# Patient Record
Sex: FEMALE | Race: BLACK OR AFRICAN AMERICAN | NOT HISPANIC OR LATINO | URBAN - METROPOLITAN AREA
[De-identification: names, ages, dates, MRNs, and addresses within clinical notes are randomized per-mention and may not be internally consistent; named-entity substitution may affect disease eponyms.]

---

## 2020-01-07 ENCOUNTER — OUTPATIENT (OUTPATIENT)
Dept: OUTPATIENT SERVICES | Facility: HOSPITAL | Age: 80
LOS: 1 days | End: 2020-01-07
Payer: MEDICARE

## 2020-01-07 VITALS
DIASTOLIC BLOOD PRESSURE: 64 MMHG | OXYGEN SATURATION: 95 % | SYSTOLIC BLOOD PRESSURE: 157 MMHG | HEART RATE: 74 BPM | WEIGHT: 270.95 LBS | RESPIRATION RATE: 16 BRPM | HEIGHT: 66 IN | TEMPERATURE: 98 F

## 2020-01-07 DIAGNOSIS — Z90.49 ACQUIRED ABSENCE OF OTHER SPECIFIED PARTS OF DIGESTIVE TRACT: Chronic | ICD-10-CM

## 2020-01-07 DIAGNOSIS — H26.9 UNSPECIFIED CATARACT: Chronic | ICD-10-CM

## 2020-01-07 DIAGNOSIS — Z98.890 OTHER SPECIFIED POSTPROCEDURAL STATES: Chronic | ICD-10-CM

## 2020-01-07 DIAGNOSIS — R06.9 UNSPECIFIED ABNORMALITIES OF BREATHING: ICD-10-CM

## 2020-01-07 DIAGNOSIS — H40.9 UNSPECIFIED GLAUCOMA: Chronic | ICD-10-CM

## 2020-01-07 LAB
ALBUMIN SERPL ELPH-MCNC: 3.9 G/DL — SIGNIFICANT CHANGE UP (ref 3.3–5)
ALP SERPL-CCNC: 90 U/L — SIGNIFICANT CHANGE UP (ref 40–120)
ALT FLD-CCNC: 6 U/L — LOW (ref 10–45)
ANION GAP SERPL CALC-SCNC: 9 MMOL/L — SIGNIFICANT CHANGE UP (ref 5–17)
AST SERPL-CCNC: 17 U/L — SIGNIFICANT CHANGE UP (ref 10–40)
BILIRUB SERPL-MCNC: 0.5 MG/DL — SIGNIFICANT CHANGE UP (ref 0.2–1.2)
BUN SERPL-MCNC: 15 MG/DL — SIGNIFICANT CHANGE UP (ref 7–23)
CALCIUM SERPL-MCNC: 9.6 MG/DL — SIGNIFICANT CHANGE UP (ref 8.4–10.5)
CHLORIDE SERPL-SCNC: 99 MMOL/L — SIGNIFICANT CHANGE UP (ref 96–108)
CO2 SERPL-SCNC: 29 MMOL/L — SIGNIFICANT CHANGE UP (ref 22–31)
CREAT SERPL-MCNC: 1.25 MG/DL — SIGNIFICANT CHANGE UP (ref 0.5–1.3)
GLUCOSE BLDC GLUCOMTR-MCNC: 213 MG/DL — HIGH (ref 70–99)
GLUCOSE SERPL-MCNC: 229 MG/DL — HIGH (ref 70–99)
HCT VFR BLD CALC: 30.5 % — LOW (ref 34.5–45)
HGB BLD-MCNC: 10.4 G/DL — LOW (ref 11.5–15.5)
MAGNESIUM SERPL-MCNC: 1.9 MG/DL — SIGNIFICANT CHANGE UP (ref 1.6–2.6)
MCHC RBC-ENTMCNC: 27.3 PG — SIGNIFICANT CHANGE UP (ref 27–34)
MCHC RBC-ENTMCNC: 34.1 GM/DL — SIGNIFICANT CHANGE UP (ref 32–36)
MCV RBC AUTO: 80.1 FL — SIGNIFICANT CHANGE UP (ref 80–100)
NRBC # BLD: 0 /100 WBCS — SIGNIFICANT CHANGE UP (ref 0–0)
PLATELET # BLD AUTO: 240 K/UL — SIGNIFICANT CHANGE UP (ref 150–400)
POTASSIUM SERPL-MCNC: 4.3 MMOL/L — SIGNIFICANT CHANGE UP (ref 3.5–5.3)
POTASSIUM SERPL-SCNC: 4.3 MMOL/L — SIGNIFICANT CHANGE UP (ref 3.5–5.3)
PROT SERPL-MCNC: 8.3 G/DL — SIGNIFICANT CHANGE UP (ref 6–8.3)
RBC # BLD: 3.81 M/UL — SIGNIFICANT CHANGE UP (ref 3.8–5.2)
RBC # FLD: 14.6 % — HIGH (ref 10.3–14.5)
SODIUM SERPL-SCNC: 137 MMOL/L — SIGNIFICANT CHANGE UP (ref 135–145)
WBC # BLD: 6.96 K/UL — SIGNIFICANT CHANGE UP (ref 3.8–10.5)
WBC # FLD AUTO: 6.96 K/UL — SIGNIFICANT CHANGE UP (ref 3.8–10.5)

## 2020-01-07 PROCEDURE — C1887: CPT

## 2020-01-07 PROCEDURE — 99152 MOD SED SAME PHYS/QHP 5/>YRS: CPT

## 2020-01-07 PROCEDURE — 85027 COMPLETE CBC AUTOMATED: CPT

## 2020-01-07 PROCEDURE — C1760: CPT

## 2020-01-07 PROCEDURE — 93454 CORONARY ARTERY ANGIO S&I: CPT | Mod: 26

## 2020-01-07 PROCEDURE — C1894: CPT

## 2020-01-07 PROCEDURE — 93010 ELECTROCARDIOGRAM REPORT: CPT

## 2020-01-07 PROCEDURE — C1769: CPT

## 2020-01-07 PROCEDURE — 80053 COMPREHEN METABOLIC PANEL: CPT

## 2020-01-07 PROCEDURE — 93454 CORONARY ARTERY ANGIO S&I: CPT

## 2020-01-07 PROCEDURE — 93005 ELECTROCARDIOGRAM TRACING: CPT

## 2020-01-07 PROCEDURE — 83735 ASSAY OF MAGNESIUM: CPT

## 2020-01-07 PROCEDURE — 82962 GLUCOSE BLOOD TEST: CPT

## 2020-01-07 RX ORDER — ACETAMINOPHEN 500 MG
1000 TABLET ORAL ONCE
Refills: 0 | Status: DISCONTINUED | OUTPATIENT
Start: 2020-01-07 | End: 2020-02-02

## 2020-01-07 NOTE — H&P CARDIOLOGY - PMH
2nd degree AV block    Glaucoma of left eye    HLD (hyperlipidemia)    HTN (hypertension)    Morbid obesity    Type 2 diabetes mellitus

## 2020-01-07 NOTE — H&P CARDIOLOGY - PSH
Cataract of right eye    Glaucoma  left eye  H/O hernia repair    H/O left knee surgery    History of cholecystectomy

## 2020-01-07 NOTE — H&P CARDIOLOGY - HISTORY OF PRESENT ILLNESS
80 yo AA female with PMHx HTN, HLD, arthritis, left TKR, morbid obesity (BMI 43.7), DMT2 on insulin who endorses worsening dyspnea on exertion and fatigue and undergoing preop for left eye glaucoma surgery was evaluated by her cardiologist Dr DARCY Lora.  TTE on 12/20/19 normal LV function and wall motion, LA mildly dilated, EF 80%, carotid doppler without any significant obstructive disease.  Holter monitoring on 12/23/19 that showed episodes of AV block type 1, HR ranged from 35bpm to 90bpm.  Pt referred for [facility] for ischemic evaluation for which she presents today with her granddaughter.  Reports some dizziness but denies CP, diaphoresis, palpitations, nausea, vomiting, peripheral edema, recent weight gain, or syncope.  No prior cardiac monitoring device implant.     Cardiology Dr DARCY Lora    Symptoms:        Angina (Class):        Ischemic Symptoms: GIRARD    Heart Failure:        Systolic/Diastolic/Combined:        NYHA Class (within 2 weeks):     Assessment of LVEF:       EF: 80%       Assessed by:  TTE       Date: 12/20/19    Prior Cardiac Interventions: no       PCI's: no       CABG: no    Noninvasive Testing:   Stress Test: Date: no       Protocol:        Duration of Exercise:        Symptoms:        EKG Changes:        DTS:        Myocardial Imaging:        Risk Assessment:     Echo: 12/20/19 TTE on 12/20/19 normal LV function and wall motion, LA mildly dilated, EF 80%,    Antianginal Therapies: yes       Beta Blockers:  no       Calcium Channel Blockers: yes       Long Acting Nitrates: yes       Ranexa:     Associated Risk Factors:        Cerebrovascular Disease: N/A       Chronic Lung Disease: N/A       Peripheral Arterial Disease: N/A       Chronic Kidney Disease (if yes, what is GFR): N/A       Uncontrolled Diabetes (if yes, what is HgbA1C or FBS): N/A       Poorly Controlled Hypertension (if yes, what is SBP): N/A       Morbid Obesity (if yes, what is BMI): yes, BMI 43.7       History of Recent Ventricular Arrhythmia: N/A       Inability to Ambulate Safely: N/A       Need for Therapeutic Anticoagulation: N/A       Antiplatelet or Contrast Allergy: N/A

## 2020-01-09 PROBLEM — E78.5 HYPERLIPIDEMIA, UNSPECIFIED: Chronic | Status: ACTIVE | Noted: 2020-01-07

## 2020-01-09 PROBLEM — H40.9 UNSPECIFIED GLAUCOMA: Chronic | Status: ACTIVE | Noted: 2020-01-07

## 2020-01-09 PROBLEM — E11.9 TYPE 2 DIABETES MELLITUS WITHOUT COMPLICATIONS: Chronic | Status: ACTIVE | Noted: 2020-01-07

## 2020-01-09 PROBLEM — I10 ESSENTIAL (PRIMARY) HYPERTENSION: Chronic | Status: ACTIVE | Noted: 2020-01-07

## 2020-01-09 PROBLEM — I44.1 ATRIOVENTRICULAR BLOCK, SECOND DEGREE: Chronic | Status: ACTIVE | Noted: 2020-01-07

## 2020-01-09 PROBLEM — E66.01 MORBID (SEVERE) OBESITY DUE TO EXCESS CALORIES: Chronic | Status: ACTIVE | Noted: 2020-01-07

## 2020-01-13 ENCOUNTER — OUTPATIENT (OUTPATIENT)
Dept: OUTPATIENT SERVICES | Facility: HOSPITAL | Age: 80
LOS: 1 days | End: 2020-01-13
Payer: MEDICARE

## 2020-01-13 VITALS
OXYGEN SATURATION: 96 % | SYSTOLIC BLOOD PRESSURE: 135 MMHG | TEMPERATURE: 98 F | WEIGHT: 270.95 LBS | RESPIRATION RATE: 16 BRPM | HEART RATE: 62 BPM | DIASTOLIC BLOOD PRESSURE: 62 MMHG | HEIGHT: 66 IN

## 2020-01-13 DIAGNOSIS — Z98.890 OTHER SPECIFIED POSTPROCEDURAL STATES: Chronic | ICD-10-CM

## 2020-01-13 DIAGNOSIS — Z01.818 ENCOUNTER FOR OTHER PREPROCEDURAL EXAMINATION: ICD-10-CM

## 2020-01-13 DIAGNOSIS — H40.9 UNSPECIFIED GLAUCOMA: Chronic | ICD-10-CM

## 2020-01-13 DIAGNOSIS — H26.9 UNSPECIFIED CATARACT: Chronic | ICD-10-CM

## 2020-01-13 DIAGNOSIS — Z90.49 ACQUIRED ABSENCE OF OTHER SPECIFIED PARTS OF DIGESTIVE TRACT: Chronic | ICD-10-CM

## 2020-01-13 DIAGNOSIS — I49.9 CARDIAC ARRHYTHMIA, UNSPECIFIED: ICD-10-CM

## 2020-01-13 LAB
ALBUMIN SERPL ELPH-MCNC: 3.7 G/DL — SIGNIFICANT CHANGE UP (ref 3.3–5)
ALP SERPL-CCNC: 79 U/L — SIGNIFICANT CHANGE UP (ref 40–120)
ALT FLD-CCNC: 11 U/L — SIGNIFICANT CHANGE UP (ref 10–45)
ANION GAP SERPL CALC-SCNC: 9 MMOL/L — SIGNIFICANT CHANGE UP (ref 5–17)
APTT BLD: 28.9 SEC — SIGNIFICANT CHANGE UP (ref 27.5–36.3)
AST SERPL-CCNC: 20 U/L — SIGNIFICANT CHANGE UP (ref 10–40)
BILIRUB SERPL-MCNC: 0.7 MG/DL — SIGNIFICANT CHANGE UP (ref 0.2–1.2)
BLD GP AB SCN SERPL QL: NEGATIVE — SIGNIFICANT CHANGE UP
BUN SERPL-MCNC: 19 MG/DL — SIGNIFICANT CHANGE UP (ref 7–23)
CALCIUM SERPL-MCNC: 9.4 MG/DL — SIGNIFICANT CHANGE UP (ref 8.4–10.5)
CHLORIDE SERPL-SCNC: 103 MMOL/L — SIGNIFICANT CHANGE UP (ref 96–108)
CO2 SERPL-SCNC: 28 MMOL/L — SIGNIFICANT CHANGE UP (ref 22–31)
CREAT SERPL-MCNC: 1.3 MG/DL — SIGNIFICANT CHANGE UP (ref 0.5–1.3)
GLUCOSE SERPL-MCNC: 64 MG/DL — LOW (ref 70–99)
HCT VFR BLD CALC: 30.3 % — LOW (ref 34.5–45)
HGB BLD-MCNC: 10.5 G/DL — LOW (ref 11.5–15.5)
INR BLD: 1.06 RATIO — SIGNIFICANT CHANGE UP (ref 0.88–1.16)
MCHC RBC-ENTMCNC: 27.3 PG — SIGNIFICANT CHANGE UP (ref 27–34)
MCHC RBC-ENTMCNC: 34.7 GM/DL — SIGNIFICANT CHANGE UP (ref 32–36)
MCV RBC AUTO: 78.7 FL — LOW (ref 80–100)
NRBC # BLD: 0 /100 WBCS — SIGNIFICANT CHANGE UP (ref 0–0)
NT-PROBNP SERPL-SCNC: 444 PG/ML — HIGH (ref 0–300)
PLATELET # BLD AUTO: 220 K/UL — SIGNIFICANT CHANGE UP (ref 150–400)
POTASSIUM SERPL-MCNC: 4.4 MMOL/L — SIGNIFICANT CHANGE UP (ref 3.5–5.3)
POTASSIUM SERPL-SCNC: 4.4 MMOL/L — SIGNIFICANT CHANGE UP (ref 3.5–5.3)
PROT SERPL-MCNC: 8.3 G/DL — SIGNIFICANT CHANGE UP (ref 6–8.3)
PROTHROM AB SERPL-ACNC: 12.1 SEC — SIGNIFICANT CHANGE UP (ref 10–12.9)
RBC # BLD: 3.85 M/UL — SIGNIFICANT CHANGE UP (ref 3.8–5.2)
RBC # FLD: 14.6 % — HIGH (ref 10.3–14.5)
RH IG SCN BLD-IMP: POSITIVE — SIGNIFICANT CHANGE UP
SODIUM SERPL-SCNC: 140 MMOL/L — SIGNIFICANT CHANGE UP (ref 135–145)
WBC # BLD: 6.94 K/UL — SIGNIFICANT CHANGE UP (ref 3.8–10.5)
WBC # FLD AUTO: 6.94 K/UL — SIGNIFICANT CHANGE UP (ref 3.8–10.5)

## 2020-01-13 PROCEDURE — 86850 RBC ANTIBODY SCREEN: CPT

## 2020-01-13 PROCEDURE — 93010 ELECTROCARDIOGRAM REPORT: CPT

## 2020-01-13 PROCEDURE — 86901 BLOOD TYPING SEROLOGIC RH(D): CPT

## 2020-01-13 PROCEDURE — 71046 X-RAY EXAM CHEST 2 VIEWS: CPT

## 2020-01-13 PROCEDURE — 93005 ELECTROCARDIOGRAM TRACING: CPT

## 2020-01-13 PROCEDURE — 71046 X-RAY EXAM CHEST 2 VIEWS: CPT | Mod: 26

## 2020-01-13 PROCEDURE — G0463: CPT

## 2020-01-13 PROCEDURE — 86900 BLOOD TYPING SEROLOGIC ABO: CPT

## 2020-01-13 RX ORDER — IPRATROPIUM/ALBUTEROL SULFATE 18-103MCG
3 AEROSOL WITH ADAPTER (GRAM) INHALATION ONCE
Refills: 0 | Status: COMPLETED | OUTPATIENT
Start: 2020-01-13 | End: 2020-01-13

## 2020-01-13 RX ORDER — FUROSEMIDE 40 MG
1 TABLET ORAL
Qty: 0 | Refills: 0 | DISCHARGE

## 2020-01-13 RX ADMIN — Medication 3 MILLILITER(S): at 13:45

## 2020-01-13 NOTE — H&P CARDIOLOGY - HISTORY OF PRESENT ILLNESS
78 y/o  female, no implantable cardiac devices, with PMH HTN, HLD, arthritis, left TKR, morbid obesity (BMI 43.7), DMT2 on insulin who endorses worsening dyspnea on exertion and fatigue and undergoing preop for left eye glaucoma surgery was evaluated by her cardiologist Dr DARCY Lora.  TTE on 12/20/19 normal LV function and wall motion, LA mildly dilated, EF 80%, carotid doppler without any significant obstructive disease.  Holter monitoring on 12/23/19 that showed episodes of AV block type 1, HR ranged from 35bpm to 90bpm.  Pt was referred for [facility] for ischemic evaluation on 1/7/2020 which revealed mild CAD.   Cardiology Dr DARCY Lora 80 y/o  female, no implantable cardiac devices, with PMH HTN, HLD, arthritis, left TKR, morbid obesity (BMI 43.7), DMT2 on insulin, who endorses worsening dyspnea on exertion and fatigue and undergoing preop for left eye glaucoma surgery was evaluated by her cardiologist Dr DARCY Lora.  TTE on 12/20/19 normal LV function and wall motion, LA mildly dilated, EF 80%, carotid doppler without any significant obstructive disease.  Holter monitoring on 12/23/19 that showed episodes of AV block, HR ranged from 35bpm to 90bpm.  Pt was referred for [facility] for ischemic evaluation on 1/7/2020 which revealed mild CAD. She now presents for PST today for PPM implant on 1/14/20.   Cardiology Dr DARCY Lora 80 y/o  female, no implantable cardiac devices, with PMH HTN, HLD, arthritis, left TKR, morbid obesity (BMI 43.7), DMT2 on insulin, who endorses worsening dyspnea on exertion and fatigue and undergoing preop for left eye glaucoma surgery was evaluated by her cardiologist Dr DARCY Lora.  TTE on 12/20/19 normal LV function and wall motion, LA mildly dilated, EF 80%, carotid doppler without any significant obstructive disease.  Holter monitoring on 12/23/19 that showed episodes of AV block, HR ranged from 35bpm to 90bpm.  Pt was referred for [facility] for ischemic evaluation on 1/7/2020 which revealed mild CAD. She now presents for PST today for PPM implant on 1/14/20.   On exam, pt with wheezing. She notes productive cough with clear phlegm for 1 month. Cough is worse at night. Denies fevers/chills. Spoke with Dr. Lora. Pt is pending CXR today and will review results with Dr. Lora prior to pt leaving today.   Cardiology Dr DARCY Lora 80 y/o  female, no implantable cardiac devices, with PMH HTN, HLD, arthritis, left TKR, morbid obesity (BMI 43.7), DMT2 on insulin, who endorses worsening dyspnea on exertion and fatigue and undergoing preop for left eye glaucoma surgery was evaluated by her cardiologist Dr DARCY Lora.  TTE on 12/20/19 normal LV function and wall motion, LA mildly dilated, EF 80%, carotid doppler without any significant obstructive disease.  Holter monitoring on 12/23/19 that showed episodes of AV block, HR ranged from 35bpm to 90bpm.  Pt was referred for [facility] for ischemic evaluation on 1/7/2020 which revealed mild CAD. She now presents for PST today for PPM implant on 1/14/20.   On exam, pt with wheezing. She notes productive cough with clear phlegm for 1 month. Cough is worse at night. Denies fevers/chills. Spoke with Dr. Lora. Pt is pending CXR today and will review results with Dr. Lora prior to pt leaving today.     Addendum: CXR shows clear lungs and wheeze improved post neb. Results reviewed with Dr. Lora. Procedure cancelled per MD - pt instructed to f/u with PMD tomorrow and call Dr. Lora's office this week to reschedule. This was reviewed with pt, pt's grandson (at bedside), and pt's daughter via phone.

## 2020-02-07 ENCOUNTER — INPATIENT (INPATIENT)
Facility: HOSPITAL | Age: 80
LOS: 5 days | Discharge: HOME | DRG: 243 | End: 2020-02-13
Attending: INTERNAL MEDICINE | Admitting: INTERNAL MEDICINE
Payer: MEDICARE

## 2020-02-07 VITALS
TEMPERATURE: 98 F | SYSTOLIC BLOOD PRESSURE: 155 MMHG | OXYGEN SATURATION: 98 % | WEIGHT: 182.98 LBS | RESPIRATION RATE: 19 BRPM | HEART RATE: 58 BPM | HEIGHT: 64 IN | DIASTOLIC BLOOD PRESSURE: 90 MMHG

## 2020-02-07 DIAGNOSIS — Z98.890 OTHER SPECIFIED POSTPROCEDURAL STATES: Chronic | ICD-10-CM

## 2020-02-07 DIAGNOSIS — I44.1 ATRIOVENTRICULAR BLOCK, SECOND DEGREE: ICD-10-CM

## 2020-02-07 DIAGNOSIS — H40.9 UNSPECIFIED GLAUCOMA: Chronic | ICD-10-CM

## 2020-02-07 DIAGNOSIS — Z90.49 ACQUIRED ABSENCE OF OTHER SPECIFIED PARTS OF DIGESTIVE TRACT: Chronic | ICD-10-CM

## 2020-02-07 DIAGNOSIS — H26.9 UNSPECIFIED CATARACT: Chronic | ICD-10-CM

## 2020-02-07 LAB
ALBUMIN SERPL ELPH-MCNC: 3.8 G/DL — SIGNIFICANT CHANGE UP (ref 3.3–5)
ALP SERPL-CCNC: 89 U/L — SIGNIFICANT CHANGE UP (ref 40–120)
ALT FLD-CCNC: 15 U/L — SIGNIFICANT CHANGE UP (ref 10–45)
ANION GAP SERPL CALC-SCNC: 11 MMOL/L — SIGNIFICANT CHANGE UP (ref 5–17)
APTT BLD: 24.9 SEC — LOW (ref 27.5–36.3)
AST SERPL-CCNC: 21 U/L — SIGNIFICANT CHANGE UP (ref 10–40)
BASE EXCESS BLDV CALC-SCNC: 3.4 MMOL/L — HIGH (ref -2–2)
BASOPHILS # BLD AUTO: 0.03 K/UL — SIGNIFICANT CHANGE UP (ref 0–0.2)
BASOPHILS NFR BLD AUTO: 0.4 % — SIGNIFICANT CHANGE UP (ref 0–2)
BILIRUB SERPL-MCNC: 0.7 MG/DL — SIGNIFICANT CHANGE UP (ref 0.2–1.2)
BUN SERPL-MCNC: 18 MG/DL — SIGNIFICANT CHANGE UP (ref 7–23)
CA-I SERPL-SCNC: 1.23 MMOL/L — SIGNIFICANT CHANGE UP (ref 1.12–1.3)
CALCIUM SERPL-MCNC: 9.6 MG/DL — SIGNIFICANT CHANGE UP (ref 8.4–10.5)
CHLORIDE BLDV-SCNC: 107 MMOL/L — SIGNIFICANT CHANGE UP (ref 96–108)
CHLORIDE SERPL-SCNC: 104 MMOL/L — SIGNIFICANT CHANGE UP (ref 96–108)
CK MB CFR SERPL CALC: 1.5 NG/ML — SIGNIFICANT CHANGE UP (ref 0–3.8)
CK SERPL-CCNC: 98 U/L — SIGNIFICANT CHANGE UP (ref 25–170)
CO2 BLDV-SCNC: 32 MMOL/L — HIGH (ref 22–30)
CO2 SERPL-SCNC: 26 MMOL/L — SIGNIFICANT CHANGE UP (ref 22–31)
CREAT SERPL-MCNC: 1.34 MG/DL — HIGH (ref 0.5–1.3)
EOSINOPHIL # BLD AUTO: 0.31 K/UL — SIGNIFICANT CHANGE UP (ref 0–0.5)
EOSINOPHIL NFR BLD AUTO: 3.9 % — SIGNIFICANT CHANGE UP (ref 0–6)
GAS PNL BLDV: 139 MMOL/L — SIGNIFICANT CHANGE UP (ref 135–145)
GAS PNL BLDV: SIGNIFICANT CHANGE UP
GLUCOSE BLDC GLUCOMTR-MCNC: 225 MG/DL — HIGH (ref 70–99)
GLUCOSE BLDV-MCNC: 151 MG/DL — HIGH (ref 70–99)
GLUCOSE SERPL-MCNC: 157 MG/DL — HIGH (ref 70–99)
HCO3 BLDV-SCNC: 30 MMOL/L — HIGH (ref 21–29)
HCT VFR BLD CALC: 30.4 % — LOW (ref 34.5–45)
HCT VFR BLDA CALC: 33 % — LOW (ref 39–50)
HGB BLD CALC-MCNC: 10.6 G/DL — LOW (ref 11.5–15.5)
HGB BLD-MCNC: 10.1 G/DL — LOW (ref 11.5–15.5)
IMM GRANULOCYTES NFR BLD AUTO: 0.4 % — SIGNIFICANT CHANGE UP (ref 0–1.5)
INR BLD: 1.08 RATIO — SIGNIFICANT CHANGE UP (ref 0.88–1.16)
LACTATE BLDV-MCNC: 1.6 MMOL/L — SIGNIFICANT CHANGE UP (ref 0.7–2)
LYMPHOCYTES # BLD AUTO: 3.5 K/UL — HIGH (ref 1–3.3)
LYMPHOCYTES # BLD AUTO: 44 % — SIGNIFICANT CHANGE UP (ref 13–44)
MCHC RBC-ENTMCNC: 26.5 PG — LOW (ref 27–34)
MCHC RBC-ENTMCNC: 33.2 GM/DL — SIGNIFICANT CHANGE UP (ref 32–36)
MCV RBC AUTO: 79.8 FL — LOW (ref 80–100)
MONOCYTES # BLD AUTO: 0.95 K/UL — HIGH (ref 0–0.9)
MONOCYTES NFR BLD AUTO: 11.9 % — SIGNIFICANT CHANGE UP (ref 2–14)
NEUTROPHILS # BLD AUTO: 3.14 K/UL — SIGNIFICANT CHANGE UP (ref 1.8–7.4)
NEUTROPHILS NFR BLD AUTO: 39.4 % — LOW (ref 43–77)
NRBC # BLD: 0 /100 WBCS — SIGNIFICANT CHANGE UP (ref 0–0)
NT-PROBNP SERPL-SCNC: 287 PG/ML — SIGNIFICANT CHANGE UP (ref 0–300)
PCO2 BLDV: 61 MMHG — HIGH (ref 35–50)
PH BLDV: 7.32 — LOW (ref 7.35–7.45)
PLATELET # BLD AUTO: 214 K/UL — SIGNIFICANT CHANGE UP (ref 150–400)
PO2 BLDV: 27 MMHG — SIGNIFICANT CHANGE UP (ref 25–45)
POTASSIUM BLDV-SCNC: 5.4 MMOL/L — HIGH (ref 3.5–5.3)
POTASSIUM SERPL-MCNC: 5.4 MMOL/L — HIGH (ref 3.5–5.3)
POTASSIUM SERPL-MCNC: 5.6 MMOL/L — HIGH (ref 3.5–5.3)
POTASSIUM SERPL-SCNC: 5.4 MMOL/L — HIGH (ref 3.5–5.3)
POTASSIUM SERPL-SCNC: 5.6 MMOL/L — HIGH (ref 3.5–5.3)
PROT SERPL-MCNC: 8.4 G/DL — HIGH (ref 6–8.3)
PROTHROM AB SERPL-ACNC: 12.3 SEC — SIGNIFICANT CHANGE UP (ref 10–12.9)
RBC # BLD: 3.81 M/UL — SIGNIFICANT CHANGE UP (ref 3.8–5.2)
RBC # FLD: 15 % — HIGH (ref 10.3–14.5)
SAO2 % BLDV: 40 % — LOW (ref 67–88)
SODIUM SERPL-SCNC: 141 MMOL/L — SIGNIFICANT CHANGE UP (ref 135–145)
TROPONIN T, HIGH SENSITIVITY RESULT: 15 NG/L — SIGNIFICANT CHANGE UP (ref 0–51)
TROPONIN T, HIGH SENSITIVITY RESULT: 16 NG/L — SIGNIFICANT CHANGE UP (ref 0–51)
WBC # BLD: 7.96 K/UL — SIGNIFICANT CHANGE UP (ref 3.8–10.5)
WBC # FLD AUTO: 7.96 K/UL — SIGNIFICANT CHANGE UP (ref 3.8–10.5)

## 2020-02-07 PROCEDURE — 71045 X-RAY EXAM CHEST 1 VIEW: CPT | Mod: 26

## 2020-02-07 PROCEDURE — 93010 ELECTROCARDIOGRAM REPORT: CPT

## 2020-02-07 PROCEDURE — 99285 EMERGENCY DEPT VISIT HI MDM: CPT | Mod: GC

## 2020-02-07 RX ORDER — DORZOLAMIDE HYDROCHLORIDE TIMOLOL MALEATE 20; 5 MG/ML; MG/ML
1 SOLUTION/ DROPS OPHTHALMIC
Refills: 0 | Status: DISCONTINUED | OUTPATIENT
Start: 2020-02-07 | End: 2020-02-13

## 2020-02-07 RX ORDER — ISOSORBIDE MONONITRATE 60 MG/1
30 TABLET, EXTENDED RELEASE ORAL DAILY
Refills: 0 | Status: DISCONTINUED | OUTPATIENT
Start: 2020-02-07 | End: 2020-02-08

## 2020-02-07 RX ORDER — LATANOPROST 0.05 MG/ML
1 SOLUTION/ DROPS OPHTHALMIC; TOPICAL AT BEDTIME
Refills: 0 | Status: DISCONTINUED | OUTPATIENT
Start: 2020-02-07 | End: 2020-02-13

## 2020-02-07 RX ORDER — INSULIN GLARGINE 100 [IU]/ML
10 INJECTION, SOLUTION SUBCUTANEOUS AT BEDTIME
Refills: 0 | Status: DISCONTINUED | OUTPATIENT
Start: 2020-02-07 | End: 2020-02-13

## 2020-02-07 RX ORDER — INSULIN LISPRO 100/ML
5 VIAL (ML) SUBCUTANEOUS
Refills: 0 | Status: DISCONTINUED | OUTPATIENT
Start: 2020-02-07 | End: 2020-02-13

## 2020-02-07 RX ORDER — HEPARIN SODIUM 5000 [USP'U]/ML
5000 INJECTION INTRAVENOUS; SUBCUTANEOUS EVERY 12 HOURS
Refills: 0 | Status: DISCONTINUED | OUTPATIENT
Start: 2020-02-07 | End: 2020-02-10

## 2020-02-07 RX ORDER — HYDRALAZINE HCL 50 MG
10 TABLET ORAL
Refills: 0 | Status: DISCONTINUED | OUTPATIENT
Start: 2020-02-07 | End: 2020-02-08

## 2020-02-07 RX ORDER — GLUCAGON INJECTION, SOLUTION 0.5 MG/.1ML
1 INJECTION, SOLUTION SUBCUTANEOUS ONCE
Refills: 0 | Status: DISCONTINUED | OUTPATIENT
Start: 2020-02-07 | End: 2020-02-13

## 2020-02-07 RX ORDER — BRIMONIDINE TARTRATE 2 MG/MG
0 SOLUTION/ DROPS OPHTHALMIC
Qty: 0 | Refills: 0 | DISCHARGE

## 2020-02-07 RX ORDER — DEXTROSE 50 % IN WATER 50 %
15 SYRINGE (ML) INTRAVENOUS ONCE
Refills: 0 | Status: DISCONTINUED | OUTPATIENT
Start: 2020-02-07 | End: 2020-02-13

## 2020-02-07 RX ORDER — LOSARTAN POTASSIUM 100 MG/1
25 TABLET, FILM COATED ORAL DAILY
Refills: 0 | Status: DISCONTINUED | OUTPATIENT
Start: 2020-02-07 | End: 2020-02-08

## 2020-02-07 RX ORDER — DEXTROSE 50 % IN WATER 50 %
25 SYRINGE (ML) INTRAVENOUS ONCE
Refills: 0 | Status: DISCONTINUED | OUTPATIENT
Start: 2020-02-07 | End: 2020-02-13

## 2020-02-07 RX ORDER — INSULIN LISPRO 100/ML
VIAL (ML) SUBCUTANEOUS AT BEDTIME
Refills: 0 | Status: DISCONTINUED | OUTPATIENT
Start: 2020-02-07 | End: 2020-02-13

## 2020-02-07 RX ORDER — ATORVASTATIN CALCIUM 80 MG/1
20 TABLET, FILM COATED ORAL AT BEDTIME
Refills: 0 | Status: DISCONTINUED | OUTPATIENT
Start: 2020-02-07 | End: 2020-02-13

## 2020-02-07 RX ORDER — INSULIN LISPRO 100/ML
VIAL (ML) SUBCUTANEOUS
Refills: 0 | Status: DISCONTINUED | OUTPATIENT
Start: 2020-02-07 | End: 2020-02-13

## 2020-02-07 RX ORDER — BRIMONIDINE TARTRATE 2 MG/MG
1 SOLUTION/ DROPS OPHTHALMIC
Qty: 0 | Refills: 0 | DISCHARGE

## 2020-02-07 RX ORDER — ACETAMINOPHEN 500 MG
650 TABLET ORAL EVERY 6 HOURS
Refills: 0 | Status: DISCONTINUED | OUTPATIENT
Start: 2020-02-07 | End: 2020-02-13

## 2020-02-07 RX ORDER — BRIMONIDINE TARTRATE 2 MG/MG
1 SOLUTION/ DROPS OPHTHALMIC
Refills: 0 | Status: DISCONTINUED | OUTPATIENT
Start: 2020-02-07 | End: 2020-02-13

## 2020-02-07 RX ORDER — DEXTROSE 50 % IN WATER 50 %
12.5 SYRINGE (ML) INTRAVENOUS ONCE
Refills: 0 | Status: DISCONTINUED | OUTPATIENT
Start: 2020-02-07 | End: 2020-02-13

## 2020-02-07 RX ORDER — SODIUM CHLORIDE 9 MG/ML
1000 INJECTION, SOLUTION INTRAVENOUS
Refills: 0 | Status: DISCONTINUED | OUTPATIENT
Start: 2020-02-07 | End: 2020-02-10

## 2020-02-07 RX ORDER — AMLODIPINE BESYLATE 2.5 MG/1
10 TABLET ORAL DAILY
Refills: 0 | Status: DISCONTINUED | OUTPATIENT
Start: 2020-02-07 | End: 2020-02-08

## 2020-02-07 RX ADMIN — Medication 650 MILLIGRAM(S): at 21:10

## 2020-02-07 RX ADMIN — INSULIN GLARGINE 10 UNIT(S): 100 INJECTION, SOLUTION SUBCUTANEOUS at 22:50

## 2020-02-07 RX ADMIN — ATORVASTATIN CALCIUM 20 MILLIGRAM(S): 80 TABLET, FILM COATED ORAL at 21:26

## 2020-02-07 RX ADMIN — HEPARIN SODIUM 5000 UNIT(S): 5000 INJECTION INTRAVENOUS; SUBCUTANEOUS at 21:26

## 2020-02-07 RX ADMIN — Medication 650 MILLIGRAM(S): at 20:40

## 2020-02-07 RX ADMIN — DORZOLAMIDE HYDROCHLORIDE TIMOLOL MALEATE 1 DROP(S): 20; 5 SOLUTION/ DROPS OPHTHALMIC at 22:50

## 2020-02-07 RX ADMIN — LATANOPROST 1 DROP(S): 0.05 SOLUTION/ DROPS OPHTHALMIC; TOPICAL at 22:00

## 2020-02-07 RX ADMIN — BRIMONIDINE TARTRATE 1 DROP(S): 2 SOLUTION/ DROPS OPHTHALMIC at 22:50

## 2020-02-07 RX ADMIN — Medication 10 MILLIGRAM(S): at 21:26

## 2020-02-07 NOTE — ED ADULT NURSE REASSESSMENT NOTE - NS ED NURSE REASSESS COMMENT FT1
Report received from PAN Jean, patient A&Ox3, breathing spontaneous and unlabored, patient on 2L-O2, daughter at bedside. Complains of pain which brought her in 6/10. Pending CT and urine at this time. Patient aware.

## 2020-02-07 NOTE — ED PROVIDER NOTE - CLINICAL SUMMARY MEDICAL DECISION MAKING FREE TEXT BOX
Attending MD Bah: 79F referred to ED by Dr. Lora of cardiology for concern for symptomatic AV block. Patient c/o near syncope, dyspnea and chest discomfort, ECG with Mobtiz 1 AV block, HR in 60s here, perfusing well, normal BP and thus no need for urgent pacing. Will obtain labs to ro hyperK, biomarkers to ro MI, maintain on telemetry and patient to be admitted for planned PPM given possible symptomatic Mobitz 1 AV block, rule out more advanced AV block on tele

## 2020-02-07 NOTE — H&P ADULT - NSICDXPASTSURGICALHX_GEN_ALL_CORE_FT
PAST SURGICAL HISTORY:  Cataract of right eye     Glaucoma left eye    H/O hernia repair     H/O left knee surgery     History of cholecystectomy

## 2020-02-07 NOTE — H&P ADULT - NSHPLABSRESULTS_GEN_ALL_CORE
10.1   7.96  )-----------( 214      ( 07 Feb 2020 14:49 )             30.4       02-07    x   |  x   |  x   ----------------------------<  x   5.4<H>   |  x   |  x     Ca    9.6      07 Feb 2020 14:49    TPro  8.4<H>  /  Alb  3.8  /  TBili  0.7  /  DBili  x   /  AST  21  /  ALT  15  /  AlkPhos  89  02-07                  PT/INR - ( 07 Feb 2020 14:49 )   PT: 12.3 sec;   INR: 1.08 ratio         PTT - ( 07 Feb 2020 14:49 )  PTT:24.9 sec    < from: Xray Chest 1 View AP/PA (02.07.20 @ 16:34) >    INTERPRETATION:  Clear lungs.    < end of copied text >    EKG SR 2 AVB

## 2020-02-07 NOTE — ED ADULT NURSE NOTE - OBJECTIVE STATEMENT
79F comes to ED sent by PMD for bradycardia and dyspnea on exertion. EMS states patient has known second degree heart block. She was at PMD prior to ED and found to be bradycardic with dyspnea on exertion. Patient endorses no complaints. EMS states her primary was concerned and wanted to send her to ER. Patient is a&ox3 appears anxious states "I do not like needles". IV was established by EMS (20G in left AC). Patient has audible wheezes. EKG was performed, patient placed on tele. Bedrails up for safety.

## 2020-02-07 NOTE — H&P ADULT - ASSESSMENT
79 f with    Bradycardia / AV block  - telemetry  - cardiac enzymes  - PPM pending   - EP evaluation    Diabetes Mellitus  - BS control  - ADA diet     HTN control    Glaucoma  - continue gtt     Obesity morbid  - nutrition education    DVT prophylaxis    Further action as per clinical course     Jamal Ramachandran MD pager [phone]

## 2020-02-07 NOTE — ED PROVIDER NOTE - PHYSICAL EXAMINATION
GENERAL: elderly female, lying in bed, NAD. Bradycardic otherwise Vital signs are within normal limits  HEENT: NC/AT, moist mucous membranes  LUNG: CTAB, no w/r/r appreciated, good respiratory effort  CV: bradycardic no m/r/g appreciated, Pulses- Radial: 2+ b/l  ABDOMEN: Soft, NTND, no rebound or guarding  MSK: No edema, no visible deformities  NEURO: AAOx4 (to person, place, time, event)  SKIN: Warm, dry, well perfused, no evidence of rash

## 2020-02-07 NOTE — ED PROVIDER NOTE - NS ED MD DISPO DIVISION
How Severe Is Your Skin Lesion?: mild Has Your Skin Lesion Been Treated?: not been treated Is This A New Presentation, Or A Follow-Up?: [facility]

## 2020-02-07 NOTE — ED PROVIDER NOTE - ATTENDING CONTRIBUTION TO CARE
Attending MD Bah:  I personally have seen and examined this patient.  Resident note reviewed and agree on plan of care and except where noted.  See HPI, PE, and MDM for details.

## 2020-02-07 NOTE — H&P ADULT - NSHPPHYSICALEXAM_GEN_ALL_CORE
PHYSICAL EXAMINATION:  Vital Signs Last 24 Hrs  T(C): 36.7 (07 Feb 2020 14:17), Max: 36.7 (07 Feb 2020 14:17)  T(F): 98.1 (07 Feb 2020 14:17), Max: 98.1 (07 Feb 2020 14:17)  HR: 65 (07 Feb 2020 15:39) (58 - 65)  BP: 144/75 (07 Feb 2020 15:39) (144/75 - 155/90)  BP(mean): --  RR: 20 (07 Feb 2020 15:39) (19 - 20)  SpO2: 100% (07 Feb 2020 15:39) (98% - 100%)  CAPILLARY BLOOD GLUCOSE      POCT Blood Glucose.: 143 mg/dL (07 Feb 2020 16:04)      GENERAL: NAD  HEAD:  atraumatic, normocephalic  EYES: sclera anicteric  ENMT: mucous membranes moist  NECK: supple, No JVD  CHEST/LUNG: clear to auscultation bilaterally; no rales, rhonchi, or wheezing b/l  HEART: normal S1, S2  ABDOMEN: BS+, soft, ND, NT   EXTREMITIES:  2+ edema b/l LEs  NEURO: awake, alert, interactive; moves all extremities  SKIN: no rashes or lesions

## 2020-02-07 NOTE — ED PROVIDER NOTE - NS ED ROS FT
CONSTITUTIONAL: No fevers, chills, fatigue, unexpected weight change, dizziness, weakness  EYES: No loss of vision, double vision, blurry vision  MOUTH/THROAT: No sore throat, painful swallowing, lumps on neck  CV: CHEST DISCOMFORT. No palpitations  PULM: SHORTNESS OF BREATH. No cough  GI: No abdominal pain, nausea, vomiting, diarrhea  : No dysuria, hematuria, polyuria  SKIN: No rashes, swelling  MSK: No muscle aches, joint aches  NEURO: no headache, numbness, tingling

## 2020-02-07 NOTE — ED ADULT NURSE REASSESSMENT NOTE - NS ED NURSE REASSESS COMMENT FT1
Report receieved from PAN Jean, patient A&Ox3, breathing spontaneous and unlabored, patient on 2L-O2, daughter at bedside. Complains of pain which brought her in 6/10. Pending CT and urine at this time. Patient aware. Report received from PAN Jean. Patient A&Ox3, breathing spontaneous and unlabored, Afib on cardiac monitor. Placed perma fit on patient for urination, provided with red socks, bed locked and in lowest position with side rails up for safety. Patient aware they are being admitted to the hospital. Will be notified when bed is available. Patient/family has no further questions at this time. Report received from PAN Jean. Patient A&Ox3, breathing spontaneous and unlabored, Afib on cardiac monitor. Placed perma fit on patient for urination, provided with red socks, bed locked and in lowest position with side rails up for safety.

## 2020-02-07 NOTE — H&P ADULT - NSHPREVIEWOFSYSTEMS_GEN_ALL_CORE
REVIEW OF SYSTEMS:    CONSTITUTIONAL: No weakness, fevers or chills  EYES/ENT: No visual changes;  No vertigo or throat pain   NECK: No pain or stiffness  RESPIRATORY: No cough, wheezing, hemoptysis; No shortness of breath  CARDIOVASCULAR: + chest pain + palpitations  GASTROINTESTINAL: No abdominal or epigastric pain. No nausea, vomiting, or hematemesis; No diarrhea or constipation. No melena or hematochezia.  GENITOURINARY: No dysuria, frequency or hematuria  NEUROLOGICAL: No numbness or weakness  SKIN: No itching, burning, rashes, or lesions   All other review of systems is negative unless indicated above.

## 2020-02-07 NOTE — ED PROVIDER NOTE - OBJECTIVE STATEMENT
79 y.o. female hx of HTN, HLD, Second degree AV block (type 1) presents to the ED from PCP office Dr. Syed Lora for admission for PPM on 2/10/2020. Patient endorses dyspnea on exertional and intermittent chest discomfort, substernal, without radiation for past few days. Denies fever, chills, syncopal episodes. Not on digoxin, ccb, bb.

## 2020-02-07 NOTE — H&P ADULT - NSICDXPASTMEDICALHX_GEN_ALL_CORE_FT
PAST MEDICAL HISTORY:  2nd degree AV block     Glaucoma of left eye     HLD (hyperlipidemia)     HTN (hypertension)     Morbid obesity     Type 2 diabetes mellitus

## 2020-02-07 NOTE — ED ADULT NURSE NOTE - CHPI ED NUR SYMPTOMS NEG
no back pain/no chills/no chest pain/no diaphoresis/no congestion/no vomiting/no dizziness/no fever/no nausea/no syncope

## 2020-02-08 DIAGNOSIS — I44.1 ATRIOVENTRICULAR BLOCK, SECOND DEGREE: ICD-10-CM

## 2020-02-08 DIAGNOSIS — D64.89 OTHER SPECIFIED ANEMIAS: ICD-10-CM

## 2020-02-08 DIAGNOSIS — I10 ESSENTIAL (PRIMARY) HYPERTENSION: ICD-10-CM

## 2020-02-08 LAB
ANION GAP SERPL CALC-SCNC: 11 MMOL/L — SIGNIFICANT CHANGE UP (ref 5–17)
BUN SERPL-MCNC: 23 MG/DL — SIGNIFICANT CHANGE UP (ref 7–23)
CALCIUM SERPL-MCNC: 9 MG/DL — SIGNIFICANT CHANGE UP (ref 8.4–10.5)
CHLORIDE SERPL-SCNC: 101 MMOL/L — SIGNIFICANT CHANGE UP (ref 96–108)
CK MB BLD-MCNC: 1.5 % — SIGNIFICANT CHANGE UP (ref 0–3.5)
CK MB CFR SERPL CALC: 1.2 NG/ML — SIGNIFICANT CHANGE UP (ref 0–3.8)
CK SERPL-CCNC: 80 U/L — SIGNIFICANT CHANGE UP (ref 25–170)
CO2 SERPL-SCNC: 25 MMOL/L — SIGNIFICANT CHANGE UP (ref 22–31)
CREAT SERPL-MCNC: 1.48 MG/DL — HIGH (ref 0.5–1.3)
GLUCOSE BLDC GLUCOMTR-MCNC: 156 MG/DL — HIGH (ref 70–99)
GLUCOSE BLDC GLUCOMTR-MCNC: 180 MG/DL — HIGH (ref 70–99)
GLUCOSE BLDC GLUCOMTR-MCNC: 203 MG/DL — HIGH (ref 70–99)
GLUCOSE BLDC GLUCOMTR-MCNC: 264 MG/DL — HIGH (ref 70–99)
GLUCOSE BLDC GLUCOMTR-MCNC: 95 MG/DL — SIGNIFICANT CHANGE UP (ref 70–99)
GLUCOSE SERPL-MCNC: 280 MG/DL — HIGH (ref 70–99)
HBA1C BLD-MCNC: 7.9 % — HIGH (ref 4–5.6)
HCT VFR BLD CALC: 28.9 % — LOW (ref 34.5–45)
HGB BLD-MCNC: 9.7 G/DL — LOW (ref 11.5–15.5)
MAGNESIUM SERPL-MCNC: 2 MG/DL — SIGNIFICANT CHANGE UP (ref 1.6–2.6)
MCHC RBC-ENTMCNC: 26.7 PG — LOW (ref 27–34)
MCHC RBC-ENTMCNC: 33.6 GM/DL — SIGNIFICANT CHANGE UP (ref 32–36)
MCV RBC AUTO: 79.6 FL — LOW (ref 80–100)
NRBC # BLD: 0 /100 WBCS — SIGNIFICANT CHANGE UP (ref 0–0)
PLATELET # BLD AUTO: 194 K/UL — SIGNIFICANT CHANGE UP (ref 150–400)
POTASSIUM SERPL-MCNC: 4.3 MMOL/L — SIGNIFICANT CHANGE UP (ref 3.5–5.3)
POTASSIUM SERPL-SCNC: 4.3 MMOL/L — SIGNIFICANT CHANGE UP (ref 3.5–5.3)
RBC # BLD: 3.63 M/UL — LOW (ref 3.8–5.2)
RBC # FLD: 14.7 % — HIGH (ref 10.3–14.5)
SODIUM SERPL-SCNC: 137 MMOL/L — SIGNIFICANT CHANGE UP (ref 135–145)
TROPONIN T, HIGH SENSITIVITY RESULT: 17 NG/L — SIGNIFICANT CHANGE UP (ref 0–51)
TSH SERPL-MCNC: 0.95 UIU/ML — SIGNIFICANT CHANGE UP (ref 0.27–4.2)
WBC # BLD: 7.77 K/UL — SIGNIFICANT CHANGE UP (ref 3.8–10.5)
WBC # FLD AUTO: 7.77 K/UL — SIGNIFICANT CHANGE UP (ref 3.8–10.5)

## 2020-02-08 PROCEDURE — 93010 ELECTROCARDIOGRAM REPORT: CPT

## 2020-02-08 RX ORDER — ATORVASTATIN CALCIUM 80 MG/1
1 TABLET, FILM COATED ORAL
Qty: 0 | Refills: 0 | DISCHARGE

## 2020-02-08 RX ORDER — BRIMONIDINE TARTRATE 2 MG/MG
1 SOLUTION/ DROPS OPHTHALMIC
Qty: 0 | Refills: 0 | DISCHARGE

## 2020-02-08 RX ORDER — ALBUTEROL 90 UG/1
2 AEROSOL, METERED ORAL EVERY 6 HOURS
Refills: 0 | Status: DISCONTINUED | OUTPATIENT
Start: 2020-02-08 | End: 2020-02-13

## 2020-02-08 RX ORDER — AMLODIPINE BESYLATE 2.5 MG/1
5 TABLET ORAL DAILY
Refills: 0 | Status: DISCONTINUED | OUTPATIENT
Start: 2020-02-09 | End: 2020-02-09

## 2020-02-08 RX ADMIN — Medication 2: at 12:09

## 2020-02-08 RX ADMIN — INSULIN GLARGINE 10 UNIT(S): 100 INJECTION, SOLUTION SUBCUTANEOUS at 22:10

## 2020-02-08 RX ADMIN — Medication 5 UNIT(S): at 08:42

## 2020-02-08 RX ADMIN — BRIMONIDINE TARTRATE 1 DROP(S): 2 SOLUTION/ DROPS OPHTHALMIC at 17:13

## 2020-02-08 RX ADMIN — HEPARIN SODIUM 5000 UNIT(S): 5000 INJECTION INTRAVENOUS; SUBCUTANEOUS at 17:14

## 2020-02-08 RX ADMIN — Medication 5 UNIT(S): at 12:09

## 2020-02-08 RX ADMIN — LATANOPROST 1 DROP(S): 0.05 SOLUTION/ DROPS OPHTHALMIC; TOPICAL at 22:13

## 2020-02-08 RX ADMIN — LOSARTAN POTASSIUM 25 MILLIGRAM(S): 100 TABLET, FILM COATED ORAL at 05:21

## 2020-02-08 RX ADMIN — Medication 4: at 08:42

## 2020-02-08 RX ADMIN — DORZOLAMIDE HYDROCHLORIDE TIMOLOL MALEATE 1 DROP(S): 20; 5 SOLUTION/ DROPS OPHTHALMIC at 17:39

## 2020-02-08 RX ADMIN — Medication 10 MILLIGRAM(S): at 05:21

## 2020-02-08 RX ADMIN — AMLODIPINE BESYLATE 10 MILLIGRAM(S): 2.5 TABLET ORAL at 05:21

## 2020-02-08 RX ADMIN — BRIMONIDINE TARTRATE 1 DROP(S): 2 SOLUTION/ DROPS OPHTHALMIC at 05:22

## 2020-02-08 RX ADMIN — HEPARIN SODIUM 5000 UNIT(S): 5000 INJECTION INTRAVENOUS; SUBCUTANEOUS at 05:21

## 2020-02-08 RX ADMIN — ATORVASTATIN CALCIUM 20 MILLIGRAM(S): 80 TABLET, FILM COATED ORAL at 22:14

## 2020-02-08 RX ADMIN — DORZOLAMIDE HYDROCHLORIDE TIMOLOL MALEATE 1 DROP(S): 20; 5 SOLUTION/ DROPS OPHTHALMIC at 05:22

## 2020-02-08 NOTE — PROGRESS NOTE ADULT - ASSESSMENT
79 f with    Bradycardia / AV block  - telemetry  - PPM pending   - EP follow    Diabetes Mellitus  - BS control  - ADA diet     HTN control    Glaucoma  - continue gtt     Obesity morbid  - nutrition education    DVT prophylaxis    d/w patient and daughter     Jamal Ramachandran MD pager [phone]

## 2020-02-08 NOTE — CONSULT NOTE ADULT - PROBLEM SELECTOR RECOMMENDATION 9
The risks, benefits and alteratives of treatment for permanent pacemaker implant were explained to patient and her  daughter, including but not exclusive of risk of bleeding, infection , pneumothorax and pericardial tamponade, lead dislodgement. consent obtained.  NPO past midnight  Sunday night . If worsening bradycardia, transfer to CCU.  type and screen in AM. TFTs normal in past- will recheck

## 2020-02-08 NOTE — PROGRESS NOTE ADULT - SUBJECTIVE AND OBJECTIVE BOX
Patient is a 79y old  Female who presents with a chief complaint of sob (08 Feb 2020 09:58)      SUBJECTIVE / OVERNIGHT EVENTS: Comfortable without new complaints.   Review of Systems  chest pain no  palpitations no  sob no  nausea no  headache no    MEDICATIONS  (STANDING):  atorvastatin 20 milliGRAM(s) Oral at bedtime  brimonidine 0.2% Ophthalmic Solution 1 Drop(s) Both EYES two times a day  dextrose 5%. 1000 milliLiter(s) (50 mL/Hr) IV Continuous <Continuous>  dextrose 50% Injectable 12.5 Gram(s) IV Push once  dextrose 50% Injectable 25 Gram(s) IV Push once  dextrose 50% Injectable 25 Gram(s) IV Push once  dorzolamide 2%/timolol 0.5% Ophthalmic Solution 1 Drop(s) Both EYES two times a day  heparin  Injectable 5000 Unit(s) SubCutaneous every 12 hours  insulin glargine Injectable (LANTUS) 10 Unit(s) SubCutaneous at bedtime  insulin lispro (HumaLOG) corrective regimen sliding scale   SubCutaneous three times a day before meals  insulin lispro (HumaLOG) corrective regimen sliding scale   SubCutaneous at bedtime  insulin lispro Injectable (HumaLOG) 5 Unit(s) SubCutaneous before breakfast  insulin lispro Injectable (HumaLOG) 5 Unit(s) SubCutaneous before lunch  insulin lispro Injectable (HumaLOG) 5 Unit(s) SubCutaneous before dinner  latanoprost 0.005% Ophthalmic Solution 1 Drop(s) Both EYES at bedtime    MEDICATIONS  (PRN):  acetaminophen   Tablet .. 650 milliGRAM(s) Oral every 6 hours PRN Temp greater or equal to 38.5C (101.3F), Mild Pain (1 - 3)  dextrose 40% Gel 15 Gram(s) Oral once PRN Blood Glucose LESS THAN 70 milliGRAM(s)/deciliter  glucagon  Injectable 1 milliGRAM(s) IntraMuscular once PRN Glucose LESS THAN 70 milligrams/deciliter      Vital Signs Last 24 Hrs  T(C): 36.6 (08 Feb 2020 14:38), Max: 36.6 (08 Feb 2020 14:38)  T(F): 97.9 (08 Feb 2020 14:38), Max: 97.9 (08 Feb 2020 14:38)  HR: 45 (08 Feb 2020 14:38) (39 - 71)  BP: 152/72 (08 Feb 2020 14:38) (108/62 - 152/72)  BP(mean): 105 (08 Feb 2020 03:25) (86 - 105)  RR: 18 (08 Feb 2020 14:38) (18 - 18)  SpO2: 100% (08 Feb 2020 14:38) (96% - 100%)    PHYSICAL EXAM:  GENERAL: NAD, well-developed  HEAD:  Atraumatic, Normocephalic  EYES: EOMI, PERRLA, conjunctiva and sclera clear  NECK: Supple, No JVD  CHEST/LUNG: Clear to auscultation bilaterally; No wheeze  HEART: Regular rate and rhythm; No murmurs, rubs, or gallops  ABDOMEN: Soft, Nontender, Nondistended; Bowel sounds present  EXTREMITIES:  2+ bipedal edema  PSYCH: AAOx3  NEUROLOGY: non-focal  SKIN: No rashes or lesions    LABS:                        9.7    7.77  )-----------( 194      ( 08 Feb 2020 03:33 )             28.9     02-08    137  |  101  |  23  ----------------------------<  280<H>  4.3   |  25  |  1.48<H>    Ca    9.0      08 Feb 2020 03:33  Mg     2.0     02-08    TPro  8.4<H>  /  Alb  3.8  /  TBili  0.7  /  DBili  x   /  AST  21  /  ALT  15  /  AlkPhos  89  02-07    PT/INR - ( 07 Feb 2020 14:49 )   PT: 12.3 sec;   INR: 1.08 ratio         PTT - ( 07 Feb 2020 14:49 )  PTT:24.9 sec  CARDIAC MARKERS ( 08 Feb 2020 03:33 )  x     / x     / 80 U/L / x     / 1.2 ng/mL  CARDIAC MARKERS ( 07 Feb 2020 19:41 )  x     / x     / 98 U/L / x     / 1.5 ng/mL    Telemetry 2nd degree AVB 30-50      RADIOLOGY & ADDITIONAL TESTS:    Imaging Personally Reviewed:    Consultant(s) Notes Reviewed:      Care Discussed with Consultants/Other Providers:

## 2020-02-08 NOTE — CONSULT NOTE ADULT - SUBJECTIVE AND OBJECTIVE BOX
Patient seen and evaluated at bedside    Chief Complaint:     HPI:  Ms Bautista is a 80 yo F with a PMH significant for HTN, HLD, Second degree AV block (type 1) who was sent to ED by Syed Bartlett 2/2 bradycardia with plans for ppm implantation 2/10. Patient was experiencing dyspnea on exertion and was being followed by Dr Bartlett and was found to have sinus bradycardia with first degree AVB and intermittent Wenckebach. Overnight, patient had episodes of 2:1 AVB with heart rates ~ 35. She felt well at the time and denies chest pain, SOB, dizziness, blurry vision, syncope. Currently, she is in normal sinus rhythm with 1:1 conduction with rates in the 70s. She feels well.      PMHx:   Glaucoma of left eye  2nd degree AV block  HLD (hyperlipidemia)  Type 2 diabetes mellitus  HTN (hypertension)  Morbid obesity      PSHx:   Glaucoma  Cataract of right eye  H/O left knee surgery  History of cholecystectomy  H/O hernia repair      Allergies:  lisinopril (Hives)  penicillin (Unknown)      Current Medications:   acetaminophen   Tablet .. 650 milliGRAM(s) Oral every 6 hours PRN  atorvastatin 20 milliGRAM(s) Oral at bedtime  brimonidine 0.2% Ophthalmic Solution 1 Drop(s) Both EYES two times a day  dextrose 40% Gel 15 Gram(s) Oral once PRN  dextrose 5%. 1000 milliLiter(s) IV Continuous <Continuous>  dextrose 50% Injectable 12.5 Gram(s) IV Push once  dextrose 50% Injectable 25 Gram(s) IV Push once  dextrose 50% Injectable 25 Gram(s) IV Push once  dorzolamide 2%/timolol 0.5% Ophthalmic Solution 1 Drop(s) Both EYES two times a day  glucagon  Injectable 1 milliGRAM(s) IntraMuscular once PRN  heparin  Injectable 5000 Unit(s) SubCutaneous every 12 hours  insulin glargine Injectable (LANTUS) 10 Unit(s) SubCutaneous at bedtime  insulin lispro (HumaLOG) corrective regimen sliding scale   SubCutaneous three times a day before meals  insulin lispro (HumaLOG) corrective regimen sliding scale   SubCutaneous at bedtime  insulin lispro Injectable (HumaLOG) 5 Unit(s) SubCutaneous before breakfast  insulin lispro Injectable (HumaLOG) 5 Unit(s) SubCutaneous before lunch  insulin lispro Injectable (HumaLOG) 5 Unit(s) SubCutaneous before dinner  latanoprost 0.005% Ophthalmic Solution 1 Drop(s) Both EYES at bedtime      FAMILY HISTORY:  Type 2 diabetes mellitus: mother      Social History:  Smoking History: denies  Alcohol Use: denies  Drug Use: denies    REVIEW OF SYSTEMS:  CONSTITUTIONAL: No weakness, fevers or chills  EYES/ENT: No visual changes;  No dysphagia  NECK: No pain or stiffness  RESPIRATORY: No cough, wheezing, hemoptysis; No shortness of breath  CARDIOVASCULAR: +GIRARD, No chest pain or palpitations; No lower extremity edema  GASTROINTESTINAL: No abdominal or epigastric pain. No nausea, vomiting, or hematemesis; No diarrhea or constipation. No melena or hematochezia.  GENITOURINARY: No dysuria, frequency or hematuria  NEUROLOGICAL: No numbness or weakness  SKIN: No itching, burning, rashes, or lesions   All other review of systems is negative unless indicated above.    Physical Exam:  T(F): 97.2 (02-08), Max: 98.1 (02-07)  HR: 66 (02-08) (39 - 71)  BP: 111/71 (02-08) (108/62 - 155/90)  RR: 18 (02-08)  SpO2: 98% (02-08)  GENERAL: No acute distress, well-developed  HEAD:  Atraumatic, Normocephalic  ENT: EOMI, PERRLA, conjunctiva and sclera clear, Neck supple, No JVD, moist mucosa  CHEST/LUNG: Clear to auscultation bilaterally; No wheeze, equal breath sounds bilaterally   BACK: No spinal tenderness  HEART: Regular rate and rhythm; No murmurs, rubs, or gallops  ABDOMEN: Soft, Nontender, Nondistended; Bowel sounds present  EXTREMITIES:  No clubbing, cyanosis, or edema  PSYCH: Nl behavior, nl affect  NEUROLOGY: AAOx3, non-focal, cranial nerves intact  SKIN: Normal color, No rashes or lesions    Cardiovascular Diagnostic Testing:    Cath:  1/7/20  CORONARY VESSELS: The coronary circulation isright dominant.  LM:   --  LM: Normal.  LAD:   --  Mid LAD: Angiography showed mild atherosclerosis with no flow  limiting lesions.  CX:   --  Circumflex: Normal.  RI:   --  Ramus intermedius: Normal.  COMPLICATIONS: There were no complications.  DIAGNOSTIC RECOMMENDATIONS: The patient should continue with the present  medications.    Imaging:    CXR: Personally reviewed    Labs: Personally reviewed                        9.7    7.77  )-----------( 194      ( 08 Feb 2020 03:33 )             28.9     02-08    137  |  101  |  23  ----------------------------<  280<H>  4.3   |  25  |  1.48<H>    Ca    9.0      08 Feb 2020 03:33  Mg     2.0     02-08    TPro  8.4<H>  /  Alb  3.8  /  TBili  0.7  /  DBili  x   /  AST  21  /  ALT  15  /  AlkPhos  89  02-07    PT/INR - ( 07 Feb 2020 14:49 )   PT: 12.3 sec;   INR: 1.08 ratio         PTT - ( 07 Feb 2020 14:49 )  PTT:24.9 sec    CARDIAC MARKERS ( 08 Feb 2020 03:33 )  17 ng/L / x     / x     / 80 U/L / x     / 1.2 ng/mL  CARDIAC MARKERS ( 07 Feb 2020 19:41 )  15 ng/L / x     / x     / 98 U/L / x     / 1.5 ng/mL  CARDIAC MARKERS ( 07 Feb 2020 14:49 )  16 ng/L / x     / x     / x     / x     / x            Serum Pro-Brain Natriuretic Peptide: 287 pg/mL (02-07 @ 14:49)

## 2020-02-08 NOTE — CONSULT NOTE ADULT - ASSESSMENT
78 yo F with a PMH significant for HTN, HLD, Second degree AV block (type 1) who was sent to ED by Syed Bartlett 2/2 kiko with plans for ppm implantation 2/10. CCU consulted after episodes of 2:1 AVB.    #2:1 AVB  Patient with brief episodes of 2:1 AVB while sleeping or resting. They are asymptomatic and she remains hemodynamically stable during the episodes. She has chronotropic competence evidenced by 1:1 conduction and increased sinus rate while moving/eating.   - hold AVN blockers  - pads on at all times  - atropine at bedside  - continuous telemonitoring  - no indication for CCU at this time  - if clinical condition worsens, please call on call fellow    Calos Robert MD  Cardiology Fellow  [phone]  All Cardiology service information can be found 24/7 on [email or web address], password: [email or web address]

## 2020-02-08 NOTE — CHART NOTE - NSCHARTNOTEFT_GEN_A_CORE
JUAN CARLOS SHAH  79y Female  Patient is a 79y old  Female who presents with a chief complaint of sob (08 Feb 2020 08:37)     Event Summary:  Notified by RN, pt with bradycardia (HR-27) for 30 seconds. Reviewed telemetry, patient is sustaining at second degree AV block type 1-rate 40s. Patient seen & examined in bed, resting comfortably, with no complaints. Noted with downtrending /62 (usually at SBP 140s).     Problem #1 Bradyarrhythmia  1.) R2 pads placed at bedside for now  2.) Continue holding all AVN blockades  3.) CCU Fellow consulted to evaluate for CCU transfer  4.) EP-Dr. Lora informed, currently arriving to bedside to discuss plans for PPM tentatively Monday 2/10. Patient's daughter at bedside and aware of management of patient's bradyarrhythmia.  5.) Continue monitoring on telemetry at this time. If becomes symptomatic or sustaining HR<40s, plan to upgrade to critical care for monitoring.    Nathaniel Saintus DNP, [facility]  #[phone]

## 2020-02-08 NOTE — CONSULT NOTE ADULT - ASSESSMENT
79 year female with DM, HTN HLD, with symptomatic bradycardia, sinus bradycardia, intermittent second degree AV block. Mild hypotension  this am. Mild anemia and mildly increased creatinine. The patient is scheduled for elective dual chamber PPM  Monday 2/10.

## 2020-02-08 NOTE — PROVIDER CONTACT NOTE (CHANGE IN STATUS NOTIFICATION) - BACKGROUND
pt admitted from EP office for PPM - cancelled due to bradycardia & EKG changes. 2nd degree type 2 HB on tele baseline.

## 2020-02-08 NOTE — CONSULT NOTE ADULT - SUBJECTIVE AND OBJECTIVE BOX
CHIEF COMPLAINT: dyspnea, bradycardia, sinus bradycardia, second degree AV block type 1        PAST MEDICAL & SURGICAL HISTORY:  Glaucoma of left eye  2nd degree AV block  HLD (hyperlipidemia)  Type 2 diabetes mellitus  HTN (hypertension)  Morbid obesity  Glaucoma: left eye  Cataract of right eye  H/O left knee surgery  History of cholecystectomy  H/O hernia repair      HPI: 79 year female with history of HTN hyperlipidemia, DM, came to my office for elective evaluation of dyspnea, found to have  NSR 90 BPM with intermittent second degree AV blcok type 1 with nocturnal HR in 30s. ECHO 1/2019 normal LV function and wall motion, mild atrial enlargement. Underwent cardiac catheterization 1/7/2020 [facility] - normal coronary arteries. The patient was scheduled for elective PPM to follow the following week,  but developed URI? flu and implant was delayed. Now came to my office yesterday, endorsing mild dyspnea on exertion, and  EKG demonstrated sinus rhythm  50-60 BPM, with extremely long AV delay 400 ms, intermittent second degree AV block type 2 and resultant heart rate 50 BPM.  No chest pain, no dizziness , presyncope or syncope. Transferred urgently from my office to [facility] ER     Seen in ER , initial K5.6, on losartan. Repeat potassium  now K 4.3 mg/dl. Mild anemia noted, Hb9.7, creatinine increased mildly to 1.4mg/dl    Telemetry this am demonstrates NSR with intermittent sinus bradycardia, intermittent second degree AV block type 1, and 2:1 AV block with narrow QRS, lowest heart rate 27 BPM.    Mild hypotension this AM,  mmHg after receiving losartan 25 mg , hydralazine 10 mg and amlodipine 10 mg.                PREVIOUS DIAGNOSTIC TESTING:      ECHO  FINDINGS:    STRESS  FINDINGS:    CATHETERIZATION  FINDINGS:    ELECTROPHYSIOLOGY STUDY  FINDINGS:    CAROTID ULTRASOUND:  FINDINGS    VENOUS DUPLEX SCAN:  FINDINGS:    CHEST CT PULMONARY ANGIO with IV Contrast:  FINDINGS:  MEDICATIONS  (STANDING):  atorvastatin 20 milliGRAM(s) Oral at bedtime  brimonidine 0.2% Ophthalmic Solution 1 Drop(s) Both EYES two times a day  dextrose 5%. 1000 milliLiter(s) (50 mL/Hr) IV Continuous <Continuous>  dextrose 50% Injectable 12.5 Gram(s) IV Push once  dextrose 50% Injectable 25 Gram(s) IV Push once  dextrose 50% Injectable 25 Gram(s) IV Push once  dorzolamide 2%/timolol 0.5% Ophthalmic Solution 1 Drop(s) Both EYES two times a day  heparin  Injectable 5000 Unit(s) SubCutaneous every 12 hours  insulin glargine Injectable (LANTUS) 10 Unit(s) SubCutaneous at bedtime  insulin lispro (HumaLOG) corrective regimen sliding scale   SubCutaneous three times a day before meals  insulin lispro (HumaLOG) corrective regimen sliding scale   SubCutaneous at bedtime  insulin lispro Injectable (HumaLOG) 5 Unit(s) SubCutaneous before breakfast  insulin lispro Injectable (HumaLOG) 5 Unit(s) SubCutaneous before lunch  insulin lispro Injectable (HumaLOG) 5 Unit(s) SubCutaneous before dinner  latanoprost 0.005% Ophthalmic Solution 1 Drop(s) Both EYES at bedtime    MEDICATIONS  (PRN):  acetaminophen   Tablet .. 650 milliGRAM(s) Oral every 6 hours PRN Temp greater or equal to 38.5C (101.3F), Mild Pain (1 - 3)  dextrose 40% Gel 15 Gram(s) Oral once PRN Blood Glucose LESS THAN 70 milliGRAM(s)/deciliter  glucagon  Injectable 1 milliGRAM(s) IntraMuscular once PRN Glucose LESS THAN 70 milligrams/deciliter      FAMILY HISTORY:  Type 2 diabetes mellitus: mother      SOCIAL HISTORY:    CIGARETTES:    ALCOHOL:    REVIEW OF SYSTEMS:    CONSTITUTIONAL: No fever, weight loss, chills, shakes, or fatigue  EYES: No eye pain, visual disturbances, or discharge  ENMT:  No difficulty hearing, tinnitus, vertigo; No sinus or throat pain  NECK: No pain or stiffness  BREASTS: No pain, masses, or nipple discharge  RESPIRATORY: No cough, wheezing, hemoptysis, or shortness of breath  CARDIOVASCULAR: No chest pain, + dyspnea, no  palpitations, dizziness, syncope, paroxysmal nocturnal dyspnea, orthopnea, or arm or leg swelling  GASTROINTESTINAL: No abdominal  or epigastric pain, nausea, vomiting, hematemesis, diarrhea, constipation, melena or bright red blood.  GENITOURINARY: No dysuria, nocturia, hematuria, or urinary incontinence  NEUROLOGICAL: No headaches, memory loss, slurred speech, limb weakness, loss of strength, numbness, or tremors  SKIN: No itching, burning, rashes, or lesions   LYMPH NODES: No enlarged glands  ENDOCRINE: No heat or cold intolerance, or hair loss  MUSCULOSKELETAL: No joint pain or swelling, muscle, back, or extremity pain + unsteady gait walks with walker  PSYCHIATRIC: No depression, anxiety, or difficulty sleeping  HEME/LYMPH: No easy bruising or bleeding gums  ALLERY AND IMMUNOLOGIC: No hives or rash.      Vital Signs Last 24 Hrs  T(C): 36.2 (08 Feb 2020 03:56), Max: 36.7 (07 Feb 2020 14:17)  T(F): 97.2 (08 Feb 2020 03:56), Max: 98.1 (07 Feb 2020 14:17)  HR: 57 (08 Feb 2020 07:46) (39 - 71)  BP: 108/62 (08 Feb 2020 07:46) (108/62 - 155/90)  BP(mean): 105 (08 Feb 2020 03:25) (86 - 105)  RR: 18 (08 Feb 2020 07:46) (18 - 20)  SpO2: 98% (08 Feb 2020 07:46) (96% - 100%)  Daily Height in cm: 162.56 (07 Feb 2020 14:17)    Daily     02-07 @ 07:01  -  02-08 @ 07:00  --------------------------------------------------------  IN: 510 mL / OUT: 2650 mL / NET: -2140 mL          PHYSICAL EXAM:    GENERAL: In no apparent distress, well nourished, and hydrated.  HEAD:  Atraumatic, Normocephalic  EYES: EOMI, PERRLA, conjunctiva and sclera clear  ENMT: No tonsillar erythema, exudates, or enlargement; Moist mucous membranes, Good dentition, No lesions  NECK: Supple and normal thyroid.  No JVD or carotid bruit.  Carotid pulse is 2+ bilaterally.  HEART: Regular rate and rhythm; No murmurs, rubs, or gallops.  PULMONARY: Clear to auscultation and perfusion.  No rales, wheezing, or rhonchi bilaterally.  ABDOMEN: Soft, Nontender, Nondistended; Bowel sounds present  EXTREMITIES:  2+ Peripheral Pulses, No clubbing, cyanosis, or edema  LYMPH: No lymphadenopathy noted  NEUROLOGICAL: Grossly nonfocal          INTERPRETATION OF TELEMETRY: NSR with prolonged first degree AV block, intermittent second degree AV block type 1, 2:1 AV block    ECG:    I&O's Detail    07 Feb 2020 07:01  -  08 Feb 2020 07:00  --------------------------------------------------------  IN:    Oral Fluid: 510 mL  Total IN: 510 mL    OUT:    Other: 1250 mL    Voided: 1400 mL  Total OUT: 2650 mL    Total NET: -2140 mL          LABS:                        9.7    7.77  )-----------( 194      ( 08 Feb 2020 03:33 )             28.9     02-08    137  |  101  |  23  ----------------------------<  280<H>  4.3   |  25  |  1.48<H>    Ca    9.0      08 Feb 2020 03:33  Mg     2.0     02-08    TPro  8.4<H>  /  Alb  3.8  /  TBili  0.7  /  DBili  x   /  AST  21  /  ALT  15  /  AlkPhos  89  02-07    CARDIAC MARKERS ( 08 Feb 2020 03:33 )  x     / x     / 80 U/L / x     / 1.2 ng/mL  CARDIAC MARKERS ( 07 Feb 2020 19:41 )  x     / x     / 98 U/L / x     / 1.5 ng/mL      PT/INR - ( 07 Feb 2020 14:49 )   PT: 12.3 sec;   INR: 1.08 ratio         PTT - ( 07 Feb 2020 14:49 )  PTT:24.9 sec    BNPSerum Pro-Brain Natriuretic Peptide: 287 pg/mL (02-07 @ 14:49)    I&O's Detail    07 Feb 2020 07:01  -  08 Feb 2020 07:00  --------------------------------------------------------  IN:    Oral Fluid: 510 mL  Total IN: 510 mL    OUT:    Other: 1250 mL    Voided: 1400 mL  Total OUT: 2650 mL    Total NET: -2140 mL        Daily Height in cm: 162.56 (07 Feb 2020 14:17)    Daily     RADIOLOGY & ADDITIONAL STUDIES:

## 2020-02-08 NOTE — PROVIDER CONTACT NOTE (CHANGE IN STATUS NOTIFICATION) - ACTION/TREATMENT ORDERED:
medicine PA called & at bedside to assess. EKG performed. . AM labs sent, pending results. pt responding to all stimuli, answering questions appropriately. neuro assessment intact. daughter @ bedside comforting patient. will continue to monitor. medicine PA called & at bedside to assess. VSS per flowsheet. EKG performed, 2nd deg type 2 HB. . AM labs sent, pending results. pt responding to all stimuli, answering questions appropriately. neuro assessment intact. daughter @ bedside comforting patient. will continue to monitor.

## 2020-02-09 LAB
ALBUMIN SERPL ELPH-MCNC: 3.4 G/DL — SIGNIFICANT CHANGE UP (ref 3.3–5)
ALP SERPL-CCNC: 85 U/L — SIGNIFICANT CHANGE UP (ref 40–120)
ALT FLD-CCNC: 10 U/L — SIGNIFICANT CHANGE UP (ref 10–45)
ANION GAP SERPL CALC-SCNC: 10 MMOL/L — SIGNIFICANT CHANGE UP (ref 5–17)
APTT BLD: 26.9 SEC — LOW (ref 27.5–36.3)
AST SERPL-CCNC: 10 U/L — SIGNIFICANT CHANGE UP (ref 10–40)
BILIRUB SERPL-MCNC: 0.7 MG/DL — SIGNIFICANT CHANGE UP (ref 0.2–1.2)
BLD GP AB SCN SERPL QL: NEGATIVE — SIGNIFICANT CHANGE UP
BUN SERPL-MCNC: 24 MG/DL — HIGH (ref 7–23)
CALCIUM SERPL-MCNC: 9.1 MG/DL — SIGNIFICANT CHANGE UP (ref 8.4–10.5)
CHLORIDE SERPL-SCNC: 101 MMOL/L — SIGNIFICANT CHANGE UP (ref 96–108)
CO2 SERPL-SCNC: 26 MMOL/L — SIGNIFICANT CHANGE UP (ref 22–31)
CREAT SERPL-MCNC: 1.42 MG/DL — HIGH (ref 0.5–1.3)
FERRITIN SERPL-MCNC: 176 NG/ML — HIGH (ref 15–150)
FOLATE SERPL-MCNC: 8.1 NG/ML — SIGNIFICANT CHANGE UP
GLUCOSE BLDC GLUCOMTR-MCNC: 141 MG/DL — HIGH (ref 70–99)
GLUCOSE BLDC GLUCOMTR-MCNC: 148 MG/DL — HIGH (ref 70–99)
GLUCOSE BLDC GLUCOMTR-MCNC: 178 MG/DL — HIGH (ref 70–99)
GLUCOSE BLDC GLUCOMTR-MCNC: 214 MG/DL — HIGH (ref 70–99)
GLUCOSE BLDC GLUCOMTR-MCNC: 254 MG/DL — HIGH (ref 70–99)
GLUCOSE SERPL-MCNC: 179 MG/DL — HIGH (ref 70–99)
HCT VFR BLD CALC: 30.4 % — LOW (ref 34.5–45)
HGB BLD-MCNC: 10.3 G/DL — LOW (ref 11.5–15.5)
INR BLD: 1.03 RATIO — SIGNIFICANT CHANGE UP (ref 0.88–1.16)
IRON SATN MFR SERPL: 20 % — SIGNIFICANT CHANGE UP (ref 14–50)
IRON SATN MFR SERPL: 50 UG/DL — SIGNIFICANT CHANGE UP (ref 30–160)
MAGNESIUM SERPL-MCNC: 2.1 MG/DL — SIGNIFICANT CHANGE UP (ref 1.6–2.6)
MCHC RBC-ENTMCNC: 26.8 PG — LOW (ref 27–34)
MCHC RBC-ENTMCNC: 33.9 GM/DL — SIGNIFICANT CHANGE UP (ref 32–36)
MCV RBC AUTO: 79 FL — LOW (ref 80–100)
NRBC # BLD: 0 /100 WBCS — SIGNIFICANT CHANGE UP (ref 0–0)
PHOSPHATE SERPL-MCNC: 3.8 MG/DL — SIGNIFICANT CHANGE UP (ref 2.5–4.5)
PLATELET # BLD AUTO: 208 K/UL — SIGNIFICANT CHANGE UP (ref 150–400)
POTASSIUM SERPL-MCNC: 4.5 MMOL/L — SIGNIFICANT CHANGE UP (ref 3.5–5.3)
POTASSIUM SERPL-SCNC: 4.5 MMOL/L — SIGNIFICANT CHANGE UP (ref 3.5–5.3)
PROT SERPL-MCNC: 7.5 G/DL — SIGNIFICANT CHANGE UP (ref 6–8.3)
PROTHROM AB SERPL-ACNC: 11.8 SEC — SIGNIFICANT CHANGE UP (ref 10–13.1)
RBC # BLD: 3.85 M/UL — SIGNIFICANT CHANGE UP (ref 3.8–5.2)
RBC # FLD: 14.6 % — HIGH (ref 10.3–14.5)
RH IG SCN BLD-IMP: POSITIVE — SIGNIFICANT CHANGE UP
SODIUM SERPL-SCNC: 137 MMOL/L — SIGNIFICANT CHANGE UP (ref 135–145)
T4 FREE SERPL-MCNC: 1.1 NG/DL — SIGNIFICANT CHANGE UP (ref 0.9–1.8)
TIBC SERPL-MCNC: 254 UG/DL — SIGNIFICANT CHANGE UP (ref 220–430)
TSH SERPL-MCNC: 0.99 UIU/ML — SIGNIFICANT CHANGE UP (ref 0.27–4.2)
UIBC SERPL-MCNC: 204 UG/DL — SIGNIFICANT CHANGE UP (ref 110–370)
VIT B12 SERPL-MCNC: 617 PG/ML — SIGNIFICANT CHANGE UP (ref 232–1245)
WBC # BLD: 6.96 K/UL — SIGNIFICANT CHANGE UP (ref 3.8–10.5)
WBC # FLD AUTO: 6.96 K/UL — SIGNIFICANT CHANGE UP (ref 3.8–10.5)

## 2020-02-09 RX ADMIN — DORZOLAMIDE HYDROCHLORIDE TIMOLOL MALEATE 1 DROP(S): 20; 5 SOLUTION/ DROPS OPHTHALMIC at 17:30

## 2020-02-09 RX ADMIN — INSULIN GLARGINE 10 UNIT(S): 100 INJECTION, SOLUTION SUBCUTANEOUS at 21:32

## 2020-02-09 RX ADMIN — DORZOLAMIDE HYDROCHLORIDE TIMOLOL MALEATE 1 DROP(S): 20; 5 SOLUTION/ DROPS OPHTHALMIC at 07:26

## 2020-02-09 RX ADMIN — BRIMONIDINE TARTRATE 1 DROP(S): 2 SOLUTION/ DROPS OPHTHALMIC at 07:27

## 2020-02-09 RX ADMIN — BRIMONIDINE TARTRATE 1 DROP(S): 2 SOLUTION/ DROPS OPHTHALMIC at 17:30

## 2020-02-09 RX ADMIN — HEPARIN SODIUM 5000 UNIT(S): 5000 INJECTION INTRAVENOUS; SUBCUTANEOUS at 07:26

## 2020-02-09 RX ADMIN — Medication 5 UNIT(S): at 12:22

## 2020-02-09 RX ADMIN — Medication 5 UNIT(S): at 07:44

## 2020-02-09 RX ADMIN — Medication 5 UNIT(S): at 18:00

## 2020-02-09 RX ADMIN — Medication 2: at 21:35

## 2020-02-09 RX ADMIN — HEPARIN SODIUM 5000 UNIT(S): 5000 INJECTION INTRAVENOUS; SUBCUTANEOUS at 17:30

## 2020-02-09 RX ADMIN — ATORVASTATIN CALCIUM 20 MILLIGRAM(S): 80 TABLET, FILM COATED ORAL at 21:32

## 2020-02-09 RX ADMIN — LATANOPROST 1 DROP(S): 0.05 SOLUTION/ DROPS OPHTHALMIC; TOPICAL at 21:33

## 2020-02-09 RX ADMIN — ALBUTEROL 2 PUFF(S): 90 AEROSOL, METERED ORAL at 19:31

## 2020-02-09 RX ADMIN — Medication 2: at 07:44

## 2020-02-09 RX ADMIN — AMLODIPINE BESYLATE 5 MILLIGRAM(S): 2.5 TABLET ORAL at 07:25

## 2020-02-09 RX ADMIN — ALBUTEROL 2 PUFF(S): 90 AEROSOL, METERED ORAL at 00:08

## 2020-02-09 NOTE — PROGRESS NOTE ADULT - ASSESSMENT
79 year female with DM, HTN HLD, with symptomatic bradycardia, sinus bradycardia, intermittent second degree AV block. The patient is scheduled for elective dual chamber PPM  Monday 2/10.

## 2020-02-09 NOTE — PROGRESS NOTE ADULT - SUBJECTIVE AND OBJECTIVE BOX
INTERVAL HPI/OVERNIGHT EVENTS: patient feels well, heart rate 35-70 BPM, episodes of second degree AV block type 1    MEDICATIONS  (STANDING):  atorvastatin 20 milliGRAM(s) Oral at bedtime  brimonidine 0.2% Ophthalmic Solution 1 Drop(s) Both EYES two times a day  dextrose 5%. 1000 milliLiter(s) (50 mL/Hr) IV Continuous <Continuous>  dextrose 50% Injectable 12.5 Gram(s) IV Push once  dextrose 50% Injectable 25 Gram(s) IV Push once  dextrose 50% Injectable 25 Gram(s) IV Push once  dorzolamide 2%/timolol 0.5% Ophthalmic Solution 1 Drop(s) Both EYES two times a day  heparin  Injectable 5000 Unit(s) SubCutaneous every 12 hours  insulin glargine Injectable (LANTUS) 10 Unit(s) SubCutaneous at bedtime  insulin lispro (HumaLOG) corrective regimen sliding scale   SubCutaneous three times a day before meals  insulin lispro (HumaLOG) corrective regimen sliding scale   SubCutaneous at bedtime  insulin lispro Injectable (HumaLOG) 5 Unit(s) SubCutaneous before breakfast  insulin lispro Injectable (HumaLOG) 5 Unit(s) SubCutaneous before lunch  insulin lispro Injectable (HumaLOG) 5 Unit(s) SubCutaneous before dinner  latanoprost 0.005% Ophthalmic Solution 1 Drop(s) Both EYES at bedtime    MEDICATIONS  (PRN):  acetaminophen   Tablet .. 650 milliGRAM(s) Oral every 6 hours PRN Temp greater or equal to 38.5C (101.3F), Mild Pain (1 - 3)  ALBUTerol    90 MICROgram(s) HFA Inhaler 2 Puff(s) Inhalation every 6 hours PRN Wheezing  dextrose 40% Gel 15 Gram(s) Oral once PRN Blood Glucose LESS THAN 70 milliGRAM(s)/deciliter  glucagon  Injectable 1 milliGRAM(s) IntraMuscular once PRN Glucose LESS THAN 70 milligrams/deciliter      Allergies    lisinopril (Hives)  penicillin (Unknown)    Intolerances      ROS:  General: Pt denies recent weight loss/fever/chills    Neurological: denies numbness or  sensation loss    Cardiovascular: denies chest pain/palpitations/leg edema    Respiratory and Thorax: denies SOB/cough/wheezing    Gastrointestinal: denies abdominal pain/diarrhea/constipation/bloody stool    Genitourinary: denies urinary frequency/urgency/ dysuria    Musculoskeletal: denies joint pain or swelling, denies restricted motion    Hematologic: denies abnormal bleeding  	    	  	    		        	    	            Vital Signs Last 24 Hrs  T(C): 37 (09 Feb 2020 13:59), Max: 37 (09 Feb 2020 13:59)  T(F): 98.6 (09 Feb 2020 13:59), Max: 98.6 (09 Feb 2020 13:59)  HR: 42 (09 Feb 2020 13:59) (42 - 73)  BP: 107/54 (09 Feb 2020 13:59) (104/65 - 148/76)  BP(mean): 78 (08 Feb 2020 19:51) (78 - 78)  RR: 18 (09 Feb 2020 13:59) (18 - 18)  SpO2: 99% (09 Feb 2020 13:59) (98% - 100%)  Daily     Daily     02-08 @ 07:01  -  02-09 @ 07:00  --------------------------------------------------------  IN: 490 mL / OUT: 1250 mL / NET: -760 mL    02-09 @ 07:01  -  02-09 @ 17:41  --------------------------------------------------------  IN: 540 mL / OUT: 0 mL / NET: 540 mL      Physical Exam:    wdwn female  noJVD  cor RRR  lung clear  abd soft  ext no edema      LABS:                        10.3   6.96  )-----------( 208      ( 09 Feb 2020 07:16 )             30.4     02-09    137  |  101  |  24<H>  ----------------------------<  179<H>  4.5   |  26  |  1.42<H>    Ca    9.1      09 Feb 2020 07:16  Phos  3.8     02-09  Mg     2.1     02-09    TPro  7.5  /  Alb  3.4  /  TBili  0.7  /  DBili  x   /  AST  10  /  ALT  10  /  AlkPhos  85  02-09    PT/INR - ( 09 Feb 2020 09:18 )   PT: 11.8 sec;   INR: 1.03 ratio         PTT - ( 09 Feb 2020 09:18 )  PTT:26.9 sec      RADIOLOGY & ADDITIONAL TESTS:    TELE:    EKG:

## 2020-02-09 NOTE — PROGRESS NOTE ADULT - SUBJECTIVE AND OBJECTIVE BOX
Patient is a 79y old  Female who presents with a chief complaint of sob (08 Feb 2020 18:18)      SUBJECTIVE / OVERNIGHT EVENTS: No new complaints. Family at bedside.   Review of Systems  chest pain no  palpitations no  sob no  nausea no  headache no    MEDICATIONS  (STANDING):  amLODIPine   Tablet 5 milliGRAM(s) Oral daily  atorvastatin 20 milliGRAM(s) Oral at bedtime  brimonidine 0.2% Ophthalmic Solution 1 Drop(s) Both EYES two times a day  dextrose 5%. 1000 milliLiter(s) (50 mL/Hr) IV Continuous <Continuous>  dextrose 50% Injectable 12.5 Gram(s) IV Push once  dextrose 50% Injectable 25 Gram(s) IV Push once  dextrose 50% Injectable 25 Gram(s) IV Push once  dorzolamide 2%/timolol 0.5% Ophthalmic Solution 1 Drop(s) Both EYES two times a day  heparin  Injectable 5000 Unit(s) SubCutaneous every 12 hours  insulin glargine Injectable (LANTUS) 10 Unit(s) SubCutaneous at bedtime  insulin lispro (HumaLOG) corrective regimen sliding scale   SubCutaneous three times a day before meals  insulin lispro (HumaLOG) corrective regimen sliding scale   SubCutaneous at bedtime  insulin lispro Injectable (HumaLOG) 5 Unit(s) SubCutaneous before breakfast  insulin lispro Injectable (HumaLOG) 5 Unit(s) SubCutaneous before lunch  insulin lispro Injectable (HumaLOG) 5 Unit(s) SubCutaneous before dinner  latanoprost 0.005% Ophthalmic Solution 1 Drop(s) Both EYES at bedtime    MEDICATIONS  (PRN):  acetaminophen   Tablet .. 650 milliGRAM(s) Oral every 6 hours PRN Temp greater or equal to 38.5C (101.3F), Mild Pain (1 - 3)  ALBUTerol    90 MICROgram(s) HFA Inhaler 2 Puff(s) Inhalation every 6 hours PRN Wheezing  dextrose 40% Gel 15 Gram(s) Oral once PRN Blood Glucose LESS THAN 70 milliGRAM(s)/deciliter  glucagon  Injectable 1 milliGRAM(s) IntraMuscular once PRN Glucose LESS THAN 70 milligrams/deciliter      Vital Signs Last 24 Hrs  T(C): 37 (09 Feb 2020 13:59), Max: 37 (09 Feb 2020 13:59)  T(F): 98.6 (09 Feb 2020 13:59), Max: 98.6 (09 Feb 2020 13:59)  HR: 42 (09 Feb 2020 13:59) (42 - 73)  BP: 107/54 (09 Feb 2020 13:59) (104/65 - 148/76)  BP(mean): 78 (08 Feb 2020 19:51) (78 - 78)  RR: 18 (09 Feb 2020 13:59) (18 - 18)  SpO2: 99% (09 Feb 2020 13:59) (98% - 100%)    PHYSICAL EXAM:  GENERAL: NAD, well-developed  HEAD:  Atraumatic, Normocephalic  EYES: EOMI, PERRLA, conjunctiva and sclera clear  NECK: Supple, No JVD  CHEST/LUNG: Clear to auscultation bilaterally; No wheeze  HEART: Regular rate and rhythm; No murmurs, rubs, or gallops  ABDOMEN: Soft, Nontender, Nondistended; Bowel sounds present  EXTREMITIES:  2+ Peripheral Pulses, No clubbing, cyanosis, or edema  PSYCH: AAOx3  NEUROLOGY: non-focal  SKIN: No rashes or lesions    LABS:                        10.3   6.96  )-----------( 208      ( 09 Feb 2020 07:16 )             30.4     02-09    137  |  101  |  24<H>  ----------------------------<  179<H>  4.5   |  26  |  1.42<H>    Ca    9.1      09 Feb 2020 07:16  Phos  3.8     02-09  Mg     2.1     02-09    TPro  7.5  /  Alb  3.4  /  TBili  0.7  /  DBili  x   /  AST  10  /  ALT  10  /  AlkPhos  85  02-09    PT/INR - ( 09 Feb 2020 09:18 )   PT: 11.8 sec;   INR: 1.03 ratio         PTT - ( 09 Feb 2020 09:18 )  PTT:26.9 sec  CARDIAC MARKERS ( 08 Feb 2020 03:33 )  x     / x     / 80 U/L / x     / 1.2 ng/mL  CARDIAC MARKERS ( 07 Feb 2020 19:41 )  x     / x     / 98 U/L / x     / 1.5 ng/mL        Telemetry Block 2/1 35-60    RADIOLOGY & ADDITIONAL TESTS:    Imaging Personally Reviewed:    Consultant(s) Notes Reviewed:      Care Discussed with Consultants/Other Providers:

## 2020-02-09 NOTE — PROGRESS NOTE ADULT - ASSESSMENT
79 f with    Bradycardia / AV block  - telemetry  - PPM pending   - EP follow    Diabetes Mellitus  - BS control  - ADA diet     HTN control    Glaucoma  - continue gtt     Obesity morbid  - nutrition education    DVT prophylaxis    d/w patient and family     Jamal Ramachandran MD pager [phone]

## 2020-02-10 LAB
ANION GAP SERPL CALC-SCNC: 10 MMOL/L — SIGNIFICANT CHANGE UP (ref 5–17)
APTT BLD: 27.9 SEC — SIGNIFICANT CHANGE UP (ref 27.5–36.3)
BUN SERPL-MCNC: 22 MG/DL — SIGNIFICANT CHANGE UP (ref 7–23)
CALCIUM SERPL-MCNC: 9.1 MG/DL — SIGNIFICANT CHANGE UP (ref 8.4–10.5)
CHLORIDE SERPL-SCNC: 102 MMOL/L — SIGNIFICANT CHANGE UP (ref 96–108)
CO2 SERPL-SCNC: 25 MMOL/L — SIGNIFICANT CHANGE UP (ref 22–31)
CREAT SERPL-MCNC: 1.28 MG/DL — SIGNIFICANT CHANGE UP (ref 0.5–1.3)
GLUCOSE BLDC GLUCOMTR-MCNC: 143 MG/DL — HIGH (ref 70–99)
GLUCOSE BLDC GLUCOMTR-MCNC: 165 MG/DL — HIGH (ref 70–99)
GLUCOSE BLDC GLUCOMTR-MCNC: 166 MG/DL — HIGH (ref 70–99)
GLUCOSE BLDC GLUCOMTR-MCNC: 178 MG/DL — HIGH (ref 70–99)
GLUCOSE BLDC GLUCOMTR-MCNC: 182 MG/DL — HIGH (ref 70–99)
GLUCOSE SERPL-MCNC: 173 MG/DL — HIGH (ref 70–99)
HCT VFR BLD CALC: 30.4 % — LOW (ref 34.5–45)
HGB BLD-MCNC: 10.4 G/DL — LOW (ref 11.5–15.5)
INR BLD: 1.08 RATIO — SIGNIFICANT CHANGE UP (ref 0.88–1.16)
MCHC RBC-ENTMCNC: 26.9 PG — LOW (ref 27–34)
MCHC RBC-ENTMCNC: 34.2 GM/DL — SIGNIFICANT CHANGE UP (ref 32–36)
MCV RBC AUTO: 78.8 FL — LOW (ref 80–100)
NRBC # BLD: 0 /100 WBCS — SIGNIFICANT CHANGE UP (ref 0–0)
PLATELET # BLD AUTO: 199 K/UL — SIGNIFICANT CHANGE UP (ref 150–400)
POTASSIUM SERPL-MCNC: 4.6 MMOL/L — SIGNIFICANT CHANGE UP (ref 3.5–5.3)
POTASSIUM SERPL-SCNC: 4.6 MMOL/L — SIGNIFICANT CHANGE UP (ref 3.5–5.3)
PROTHROM AB SERPL-ACNC: 12.4 SEC — SIGNIFICANT CHANGE UP (ref 10–12.9)
RBC # BLD: 3.86 M/UL — SIGNIFICANT CHANGE UP (ref 3.8–5.2)
RBC # FLD: 14.7 % — HIGH (ref 10.3–14.5)
SODIUM SERPL-SCNC: 137 MMOL/L — SIGNIFICANT CHANGE UP (ref 135–145)
WBC # BLD: 8.1 K/UL — SIGNIFICANT CHANGE UP (ref 3.8–10.5)
WBC # FLD AUTO: 8.1 K/UL — SIGNIFICANT CHANGE UP (ref 3.8–10.5)

## 2020-02-10 PROCEDURE — 71045 X-RAY EXAM CHEST 1 VIEW: CPT | Mod: 26

## 2020-02-10 PROCEDURE — 93010 ELECTROCARDIOGRAM REPORT: CPT | Mod: 76

## 2020-02-10 RX ORDER — VANCOMYCIN HCL 1 G
750 VIAL (EA) INTRAVENOUS ONCE
Refills: 0 | Status: DISCONTINUED | OUTPATIENT
Start: 2020-02-10 | End: 2020-02-10

## 2020-02-10 RX ORDER — ACETAMINOPHEN 500 MG
1000 TABLET ORAL ONCE
Refills: 0 | Status: COMPLETED | OUTPATIENT
Start: 2020-02-10 | End: 2020-02-11

## 2020-02-10 RX ORDER — VANCOMYCIN HCL 1 G
1000 VIAL (EA) INTRAVENOUS ONCE
Refills: 0 | Status: COMPLETED | OUTPATIENT
Start: 2020-02-11 | End: 2020-02-11

## 2020-02-10 RX ORDER — AMLODIPINE BESYLATE 2.5 MG/1
5 TABLET ORAL DAILY
Refills: 0 | Status: DISCONTINUED | OUTPATIENT
Start: 2020-02-10 | End: 2020-02-12

## 2020-02-10 RX ORDER — VANCOMYCIN HCL 1 G
1000 VIAL (EA) INTRAVENOUS ONCE
Refills: 0 | Status: DISCONTINUED | OUTPATIENT
Start: 2020-02-10 | End: 2020-02-10

## 2020-02-10 RX ORDER — VANCOMYCIN HCL 1 G
750 VIAL (EA) INTRAVENOUS EVERY 12 HOURS
Refills: 0 | Status: DISCONTINUED | OUTPATIENT
Start: 2020-02-10 | End: 2020-02-10

## 2020-02-10 RX ORDER — ACETAMINOPHEN WITH CODEINE 300MG-30MG
1 TABLET ORAL EVERY 4 HOURS
Refills: 0 | Status: DISCONTINUED | OUTPATIENT
Start: 2020-02-10 | End: 2020-02-11

## 2020-02-10 RX ADMIN — DORZOLAMIDE HYDROCHLORIDE TIMOLOL MALEATE 1 DROP(S): 20; 5 SOLUTION/ DROPS OPHTHALMIC at 05:55

## 2020-02-10 RX ADMIN — Medication 1 TABLET(S): at 17:12

## 2020-02-10 RX ADMIN — Medication 1 TABLET(S): at 16:42

## 2020-02-10 RX ADMIN — DORZOLAMIDE HYDROCHLORIDE TIMOLOL MALEATE 1 DROP(S): 20; 5 SOLUTION/ DROPS OPHTHALMIC at 17:35

## 2020-02-10 RX ADMIN — Medication 1 TABLET(S): at 13:17

## 2020-02-10 RX ADMIN — BRIMONIDINE TARTRATE 1 DROP(S): 2 SOLUTION/ DROPS OPHTHALMIC at 17:23

## 2020-02-10 RX ADMIN — Medication 5 UNIT(S): at 17:21

## 2020-02-10 RX ADMIN — Medication 1 TABLET(S): at 22:28

## 2020-02-10 RX ADMIN — BRIMONIDINE TARTRATE 1 DROP(S): 2 SOLUTION/ DROPS OPHTHALMIC at 05:55

## 2020-02-10 RX ADMIN — LATANOPROST 1 DROP(S): 0.05 SOLUTION/ DROPS OPHTHALMIC; TOPICAL at 22:27

## 2020-02-10 RX ADMIN — Medication 650 MILLIGRAM(S): at 05:55

## 2020-02-10 RX ADMIN — Medication 650 MILLIGRAM(S): at 06:24

## 2020-02-10 RX ADMIN — Medication 2: at 13:55

## 2020-02-10 RX ADMIN — Medication 1 TABLET(S): at 22:58

## 2020-02-10 RX ADMIN — ATORVASTATIN CALCIUM 20 MILLIGRAM(S): 80 TABLET, FILM COATED ORAL at 22:27

## 2020-02-10 RX ADMIN — Medication 1 TABLET(S): at 13:47

## 2020-02-10 RX ADMIN — INSULIN GLARGINE 10 UNIT(S): 100 INJECTION, SOLUTION SUBCUTANEOUS at 22:27

## 2020-02-10 RX ADMIN — Medication 5 UNIT(S): at 13:55

## 2020-02-10 NOTE — PROGRESS NOTE ADULT - SUBJECTIVE AND OBJECTIVE BOX
Patient is a 79y old  Female who presents with a chief complaint of sob (10 Feb 2020 11:06)      SUBJECTIVE / OVERNIGHT EVENTS: Comfortable without new complaints. S/P PPM   Review of Systems  chest pain no  palpitations no  sob no  nausea no  headache no    MEDICATIONS  (STANDING):  acetaminophen  IVPB .. 1000 milliGRAM(s) IV Intermittent once  amLODIPine   Tablet 5 milliGRAM(s) Oral daily  atorvastatin 20 milliGRAM(s) Oral at bedtime  brimonidine 0.2% Ophthalmic Solution 1 Drop(s) Both EYES two times a day  dextrose 50% Injectable 12.5 Gram(s) IV Push once  dextrose 50% Injectable 25 Gram(s) IV Push once  dextrose 50% Injectable 25 Gram(s) IV Push once  dorzolamide 2%/timolol 0.5% Ophthalmic Solution 1 Drop(s) Both EYES two times a day  insulin glargine Injectable (LANTUS) 10 Unit(s) SubCutaneous at bedtime  insulin lispro (HumaLOG) corrective regimen sliding scale   SubCutaneous three times a day before meals  insulin lispro (HumaLOG) corrective regimen sliding scale   SubCutaneous at bedtime  insulin lispro Injectable (HumaLOG) 5 Unit(s) SubCutaneous before breakfast  insulin lispro Injectable (HumaLOG) 5 Unit(s) SubCutaneous before lunch  insulin lispro Injectable (HumaLOG) 5 Unit(s) SubCutaneous before dinner  latanoprost 0.005% Ophthalmic Solution 1 Drop(s) Both EYES at bedtime    MEDICATIONS  (PRN):  acetaminophen   Tablet .. 650 milliGRAM(s) Oral every 6 hours PRN Temp greater or equal to 38.5C (101.3F), Mild Pain (1 - 3)  acetaminophen 300 mG/codeine 30 mG 1 Tablet(s) Oral every 4 hours PRN Moderate Pain (4 - 6)  ALBUTerol    90 MICROgram(s) HFA Inhaler 2 Puff(s) Inhalation every 6 hours PRN Wheezing  dextrose 40% Gel 15 Gram(s) Oral once PRN Blood Glucose LESS THAN 70 milliGRAM(s)/deciliter  glucagon  Injectable 1 milliGRAM(s) IntraMuscular once PRN Glucose LESS THAN 70 milligrams/deciliter      Vital Signs Last 24 Hrs  T(C): 36.5 (10 Feb 2020 14:05), Max: 37 (09 Feb 2020 20:03)  T(F): 97.7 (10 Feb 2020 14:05), Max: 98.6 (09 Feb 2020 20:03)  HR: 71 (10 Feb 2020 16:38) (45 - 71)  BP: 144/65 (10 Feb 2020 16:38) (121/69 - 154/79)  BP(mean): --  RR: 18 (10 Feb 2020 14:05) (18 - 18)  SpO2: 99% (10 Feb 2020 14:05) (99% - 99%)    PHYSICAL EXAM:  GENERAL: NAD, well-developed  HEAD:  Atraumatic, Normocephalic  EYES: EOMI, PERRLA, conjunctiva and sclera clear  NECK: Supple, No JVD  CHEST/LUNG: Clear to auscultation bilaterally; No wheeze PPM site with clean dressing  HEART: Regular rate and rhythm; No murmurs, rubs, or gallops  ABDOMEN: Soft, Nontender, Nondistended; Bowel sounds present  EXTREMITIES:  2+ Peripheral Pulses, No clubbing, cyanosis, or edema  PSYCH: AAOx3  NEUROLOGY: non-focal  SKIN: No rashes or lesions    LABS:                        10.4   8.10  )-----------( 199      ( 10 Feb 2020 05:55 )             30.4     02-10    137  |  102  |  22  ----------------------------<  173<H>  4.6   |  25  |  1.28    Ca    9.1      10 Feb 2020 05:55  Phos  3.8     02-09  Mg     2.1     02-09    TPro  7.5  /  Alb  3.4  /  TBili  0.7  /  DBili  x   /  AST  10  /  ALT  10  /  AlkPhos  85  02-09    PT/INR - ( 10 Feb 2020 05:55 )   PT: 12.4 sec;   INR: 1.08 ratio         PTT - ( 10 Feb 2020 05:55 )  PTT:27.9 sec            RADIOLOGY & ADDITIONAL TESTS:    Imaging Personally Reviewed:    Consultant(s) Notes Reviewed:      Care Discussed with Consultants/Other Providers:

## 2020-02-10 NOTE — CHART NOTE - NSCHARTNOTEFT_GEN_A_CORE
Procedure: Implant dual chamber permanent pacemaker    Proceduralist: Dr Syed Lora    Indication L dyspnea, dizziness, sinus bradycardia, second degree AV block type 1    Procedure: After consent obtained, the patient was brought to the EP lab and draped and prepped in the usual sterile manner. She was sedated by anesthesia. A venogram revealed a patent left subclavian vein ,and local lidocaine/Sensorcaine was injected in to the area,. Thereafter a 2 inch incision was made over the left deltopectoral groove an a cutdown made to the level of the pectoralis fascia. A pocket was fashioned and hemostasis was achieved. Thereafter via standard Seldinger technique, and a retained wire,. two [employer] leads were appropriately positioned into the high right ventricular septum and the right atrial appendage.. The leads were secured with 2.0 silk to the deeper layers of the fascia , and attached to a Jibo  dual chamber generator,. The area was copiously irrigated with bacitracin solution. The wound was then closed with 2.0 then 3.0 Vicryl then 4.0 Monocryl for the subcuticular layers. a Steri-strip and pressure inflation dressing was placed, The patient was returned to the recovery room in stable condition. The patient received 1 g Vancomycin IV during the procedure    Assessment: Successful implant of dual chamber PPM    Recommendation:    Stat CXR and stat EKG now  vancomycin 750 IV q 12 hr x 2 further doses  resume Amlodipine 5 mg daily   PA LAT CXR in am  pressure dressing placed with 20 cc air inflation- leave on for 2 hours, then remove the air, wait 5 minutes, and then reinflate at 10 cc air x 2 hours, then remove the top dressing.

## 2020-02-10 NOTE — PROGRESS NOTE ADULT - ASSESSMENT
79 f with    Bradycardia / AV block  - telemetry  - s/p PPM   - EP follow    Diabetes Mellitus  - BS control  - ADA diet     HTN control    Glaucoma  - continue gtt     Obesity morbid  - nutrition education    DVT prophylaxis    d/w patient and family     Jamal Ramachandran MD pager [phone]

## 2020-02-10 NOTE — PROGRESS NOTE ADULT - ASSESSMENT
79 year female with DM, HTN HLD, with symptomatic bradycardia, sinus bradycardia, intermittent second degree AV block. The patient is post PPM     see event note for assessment and plan

## 2020-02-10 NOTE — PROGRESS NOTE ADULT - SUBJECTIVE AND OBJECTIVE BOX
INTERVAL HPI/OVERNIGHT EVENTS: patient post PPM    MEDICATIONS  (STANDING):  atorvastatin 20 milliGRAM(s) Oral at bedtime  brimonidine 0.2% Ophthalmic Solution 1 Drop(s) Both EYES two times a day  dextrose 5%. 1000 milliLiter(s) (50 mL/Hr) IV Continuous <Continuous>  dextrose 50% Injectable 12.5 Gram(s) IV Push once  dextrose 50% Injectable 25 Gram(s) IV Push once  dextrose 50% Injectable 25 Gram(s) IV Push once  dorzolamide 2%/timolol 0.5% Ophthalmic Solution 1 Drop(s) Both EYES two times a day  insulin glargine Injectable (LANTUS) 10 Unit(s) SubCutaneous at bedtime  insulin lispro (HumaLOG) corrective regimen sliding scale   SubCutaneous three times a day before meals  insulin lispro (HumaLOG) corrective regimen sliding scale   SubCutaneous at bedtime  insulin lispro Injectable (HumaLOG) 5 Unit(s) SubCutaneous before breakfast  insulin lispro Injectable (HumaLOG) 5 Unit(s) SubCutaneous before lunch  insulin lispro Injectable (HumaLOG) 5 Unit(s) SubCutaneous before dinner  latanoprost 0.005% Ophthalmic Solution 1 Drop(s) Both EYES at bedtime  vancomycin  IVPB 750 milliGRAM(s) IV Intermittent once  vancomycin  IVPB 750 milliGRAM(s) IV Intermittent every 12 hours  vancomycin  IVPB 750 milliGRAM(s) IV Intermittent every 12 hours    MEDICATIONS  (PRN):  acetaminophen   Tablet .. 650 milliGRAM(s) Oral every 6 hours PRN Temp greater or equal to 38.5C (101.3F), Mild Pain (1 - 3)  acetaminophen 300 mG/codeine 30 mG 1 Tablet(s) Oral every 4 hours PRN Moderate Pain (4 - 6)  ALBUTerol    90 MICROgram(s) HFA Inhaler 2 Puff(s) Inhalation every 6 hours PRN Wheezing  dextrose 40% Gel 15 Gram(s) Oral once PRN Blood Glucose LESS THAN 70 milliGRAM(s)/deciliter  glucagon  Injectable 1 milliGRAM(s) IntraMuscular once PRN Glucose LESS THAN 70 milligrams/deciliter      Allergies    lisinopril (Hives)  penicillin (Unknown)    Intolerances      ROS:  General: Pt denies recent weight loss/fever/chills    Neurological: denies numbness or  sensation loss    Cardiovascular: denies chest pain/palpitations/leg edema    Respiratory and Thorax: denies SOB/cough/wheezing    Gastrointestinal: denies abdominal pain/diarrhea/constipation/bloody stool    Genitourinary: denies urinary frequency/urgency/ dysuria    Musculoskeletal: denies joint pain or swelling, denies restricted motion    Hematologic: denies abnormal bleeding  	    	  	    		        	    	            Vital Signs Last 24 Hrs  T(C): 36.8 (10 Feb 2020 08:08), Max: 37 (09 Feb 2020 13:59)  T(F): 98.2 (10 Feb 2020 05:50), Max: 98.6 (09 Feb 2020 13:59)  HR: 68 (10 Feb 2020 08:08) (42 - 68)  BP: 154/79 (10 Feb 2020 08:08) (107/54 - 154/79)  BP(mean): --  RR: 18 (10 Feb 2020 08:08) (18 - 18)  SpO2: 99% (10 Feb 2020 08:08) (99% - 99%)  Daily Height in cm: 162.56 (10 Feb 2020 08:08)    Daily     02-09 @ 07:01  -  02-10 @ 07:00  --------------------------------------------------------  IN: 840 mL / OUT: 1550 mL / NET: -710 mL      Physical Exam:    wdwn femalenoJVD  cor RRr  lung clear  abd soft  left chest wound covered      LABS:                        10.4   8.10  )-----------( 199      ( 10 Feb 2020 05:55 )             30.4     02-10    137  |  102  |  22  ----------------------------<  173<H>  4.6   |  25  |  1.28    Ca    9.1      10 Feb 2020 05:55  Phos  3.8     02-09  Mg     2.1     02-09    TPro  7.5  /  Alb  3.4  /  TBili  0.7  /  DBili  x   /  AST  10  /  ALT  10  /  AlkPhos  85  02-09    PT/INR - ( 10 Feb 2020 05:55 )   PT: 12.4 sec;   INR: 1.08 ratio         PTT - ( 10 Feb 2020 05:55 )  PTT:27.9 sec      RADIOLOGY & ADDITIONAL TESTS:    TELE:    EKG:

## 2020-02-11 LAB
ANION GAP SERPL CALC-SCNC: 8 MMOL/L — SIGNIFICANT CHANGE UP (ref 5–17)
BASOPHILS # BLD AUTO: 0.01 K/UL — SIGNIFICANT CHANGE UP (ref 0–0.2)
BASOPHILS NFR BLD AUTO: 0.1 % — SIGNIFICANT CHANGE UP (ref 0–2)
BUN SERPL-MCNC: 18 MG/DL — SIGNIFICANT CHANGE UP (ref 7–23)
CALCIUM SERPL-MCNC: 8.9 MG/DL — SIGNIFICANT CHANGE UP (ref 8.4–10.5)
CHLORIDE SERPL-SCNC: 99 MMOL/L — SIGNIFICANT CHANGE UP (ref 96–108)
CO2 SERPL-SCNC: 27 MMOL/L — SIGNIFICANT CHANGE UP (ref 22–31)
CREAT SERPL-MCNC: 1.33 MG/DL — HIGH (ref 0.5–1.3)
EOSINOPHIL # BLD AUTO: 0.32 K/UL — SIGNIFICANT CHANGE UP (ref 0–0.5)
EOSINOPHIL NFR BLD AUTO: 4.5 % — SIGNIFICANT CHANGE UP (ref 0–6)
GLUCOSE BLDC GLUCOMTR-MCNC: 140 MG/DL — HIGH (ref 70–99)
GLUCOSE BLDC GLUCOMTR-MCNC: 213 MG/DL — HIGH (ref 70–99)
GLUCOSE BLDC GLUCOMTR-MCNC: 217 MG/DL — HIGH (ref 70–99)
GLUCOSE BLDC GLUCOMTR-MCNC: 251 MG/DL — HIGH (ref 70–99)
GLUCOSE SERPL-MCNC: 251 MG/DL — HIGH (ref 70–99)
HCT VFR BLD CALC: 28.6 % — LOW (ref 34.5–45)
HGB BLD-MCNC: 9.8 G/DL — LOW (ref 11.5–15.5)
IMM GRANULOCYTES NFR BLD AUTO: 0.3 % — SIGNIFICANT CHANGE UP (ref 0–1.5)
LYMPHOCYTES # BLD AUTO: 2.31 K/UL — SIGNIFICANT CHANGE UP (ref 1–3.3)
LYMPHOCYTES # BLD AUTO: 32.4 % — SIGNIFICANT CHANGE UP (ref 13–44)
MCHC RBC-ENTMCNC: 27.5 PG — SIGNIFICANT CHANGE UP (ref 27–34)
MCHC RBC-ENTMCNC: 34.3 GM/DL — SIGNIFICANT CHANGE UP (ref 32–36)
MCV RBC AUTO: 80.1 FL — SIGNIFICANT CHANGE UP (ref 80–100)
MONOCYTES # BLD AUTO: 0.76 K/UL — SIGNIFICANT CHANGE UP (ref 0–0.9)
MONOCYTES NFR BLD AUTO: 10.6 % — SIGNIFICANT CHANGE UP (ref 2–14)
NEUTROPHILS # BLD AUTO: 3.72 K/UL — SIGNIFICANT CHANGE UP (ref 1.8–7.4)
NEUTROPHILS NFR BLD AUTO: 52.1 % — SIGNIFICANT CHANGE UP (ref 43–77)
NRBC # BLD: 0 /100 WBCS — SIGNIFICANT CHANGE UP (ref 0–0)
PLATELET # BLD AUTO: 182 K/UL — SIGNIFICANT CHANGE UP (ref 150–400)
POTASSIUM SERPL-MCNC: 4.4 MMOL/L — SIGNIFICANT CHANGE UP (ref 3.5–5.3)
POTASSIUM SERPL-SCNC: 4.4 MMOL/L — SIGNIFICANT CHANGE UP (ref 3.5–5.3)
RBC # BLD: 3.57 M/UL — LOW (ref 3.8–5.2)
RBC # FLD: 14.7 % — HIGH (ref 10.3–14.5)
SODIUM SERPL-SCNC: 134 MMOL/L — LOW (ref 135–145)
WBC # BLD: 7.14 K/UL — SIGNIFICANT CHANGE UP (ref 3.8–10.5)
WBC # FLD AUTO: 7.14 K/UL — SIGNIFICANT CHANGE UP (ref 3.8–10.5)

## 2020-02-11 PROCEDURE — 93010 ELECTROCARDIOGRAM REPORT: CPT

## 2020-02-11 PROCEDURE — 71046 X-RAY EXAM CHEST 2 VIEWS: CPT | Mod: 26

## 2020-02-11 RX ORDER — ONDANSETRON 8 MG/1
4 TABLET, FILM COATED ORAL ONCE
Refills: 0 | Status: COMPLETED | OUTPATIENT
Start: 2020-02-11 | End: 2020-02-11

## 2020-02-11 RX ORDER — SODIUM CHLORIDE 9 MG/ML
1000 INJECTION INTRAMUSCULAR; INTRAVENOUS; SUBCUTANEOUS
Refills: 0 | Status: DISCONTINUED | OUTPATIENT
Start: 2020-02-11 | End: 2020-02-12

## 2020-02-11 RX ORDER — ACETAMINOPHEN 500 MG
1000 TABLET ORAL ONCE
Refills: 0 | Status: COMPLETED | OUTPATIENT
Start: 2020-02-11 | End: 2020-02-11

## 2020-02-11 RX ADMIN — Medication 1000 MILLIGRAM(S): at 20:28

## 2020-02-11 RX ADMIN — Medication 400 MILLIGRAM(S): at 11:59

## 2020-02-11 RX ADMIN — Medication 5 UNIT(S): at 12:08

## 2020-02-11 RX ADMIN — Medication 5 UNIT(S): at 17:09

## 2020-02-11 RX ADMIN — BRIMONIDINE TARTRATE 1 DROP(S): 2 SOLUTION/ DROPS OPHTHALMIC at 18:15

## 2020-02-11 RX ADMIN — Medication 400 MILLIGRAM(S): at 19:58

## 2020-02-11 RX ADMIN — SODIUM CHLORIDE 50 MILLILITER(S): 9 INJECTION INTRAMUSCULAR; INTRAVENOUS; SUBCUTANEOUS at 14:19

## 2020-02-11 RX ADMIN — LATANOPROST 1 DROP(S): 0.05 SOLUTION/ DROPS OPHTHALMIC; TOPICAL at 22:16

## 2020-02-11 RX ADMIN — BRIMONIDINE TARTRATE 1 DROP(S): 2 SOLUTION/ DROPS OPHTHALMIC at 05:03

## 2020-02-11 RX ADMIN — Medication 5 UNIT(S): at 08:03

## 2020-02-11 RX ADMIN — ATORVASTATIN CALCIUM 20 MILLIGRAM(S): 80 TABLET, FILM COATED ORAL at 22:16

## 2020-02-11 RX ADMIN — Medication 6: at 17:08

## 2020-02-11 RX ADMIN — ONDANSETRON 4 MILLIGRAM(S): 8 TABLET, FILM COATED ORAL at 11:02

## 2020-02-11 RX ADMIN — Medication 4: at 08:02

## 2020-02-11 RX ADMIN — Medication 1000 MILLIGRAM(S): at 12:45

## 2020-02-11 RX ADMIN — DORZOLAMIDE HYDROCHLORIDE TIMOLOL MALEATE 1 DROP(S): 20; 5 SOLUTION/ DROPS OPHTHALMIC at 05:03

## 2020-02-11 RX ADMIN — INSULIN GLARGINE 10 UNIT(S): 100 INJECTION, SOLUTION SUBCUTANEOUS at 22:15

## 2020-02-11 RX ADMIN — DORZOLAMIDE HYDROCHLORIDE TIMOLOL MALEATE 1 DROP(S): 20; 5 SOLUTION/ DROPS OPHTHALMIC at 18:14

## 2020-02-11 RX ADMIN — AMLODIPINE BESYLATE 5 MILLIGRAM(S): 2.5 TABLET ORAL at 05:04

## 2020-02-11 RX ADMIN — Medication 250 MILLIGRAM(S): at 11:57

## 2020-02-11 RX ADMIN — Medication 1 TABLET(S): at 05:04

## 2020-02-11 NOTE — PROGRESS NOTE ADULT - SUBJECTIVE AND OBJECTIVE BOX
Patient is a 79y old  Female who presents with a chief complaint of sob (10 Feb 2020 15:01)      SUBJECTIVE / OVERNIGHT EVENTS: Comfortable without new complaints.   Review of Systems  chest pain no  palpitations no  sob no  nausea no  headache no    MEDICATIONS  (STANDING):  amLODIPine   Tablet 5 milliGRAM(s) Oral daily  atorvastatin 20 milliGRAM(s) Oral at bedtime  brimonidine 0.2% Ophthalmic Solution 1 Drop(s) Both EYES two times a day  dextrose 50% Injectable 12.5 Gram(s) IV Push once  dextrose 50% Injectable 25 Gram(s) IV Push once  dextrose 50% Injectable 25 Gram(s) IV Push once  dorzolamide 2%/timolol 0.5% Ophthalmic Solution 1 Drop(s) Both EYES two times a day  insulin glargine Injectable (LANTUS) 10 Unit(s) SubCutaneous at bedtime  insulin lispro (HumaLOG) corrective regimen sliding scale   SubCutaneous three times a day before meals  insulin lispro (HumaLOG) corrective regimen sliding scale   SubCutaneous at bedtime  insulin lispro Injectable (HumaLOG) 5 Unit(s) SubCutaneous before breakfast  insulin lispro Injectable (HumaLOG) 5 Unit(s) SubCutaneous before lunch  insulin lispro Injectable (HumaLOG) 5 Unit(s) SubCutaneous before dinner  latanoprost 0.005% Ophthalmic Solution 1 Drop(s) Both EYES at bedtime  sodium chloride 0.9%. 1000 milliLiter(s) (50 mL/Hr) IV Continuous <Continuous>    MEDICATIONS  (PRN):  acetaminophen   Tablet .. 650 milliGRAM(s) Oral every 6 hours PRN Temp greater or equal to 38.5C (101.3F), Mild Pain (1 - 3)  acetaminophen 300 mG/codeine 30 mG 1 Tablet(s) Oral every 4 hours PRN Moderate Pain (4 - 6)  ALBUTerol    90 MICROgram(s) HFA Inhaler 2 Puff(s) Inhalation every 6 hours PRN Wheezing  dextrose 40% Gel 15 Gram(s) Oral once PRN Blood Glucose LESS THAN 70 milliGRAM(s)/deciliter  glucagon  Injectable 1 milliGRAM(s) IntraMuscular once PRN Glucose LESS THAN 70 milligrams/deciliter      Vital Signs Last 24 Hrs  T(C): 36.7 (11 Feb 2020 13:51), Max: 37.2 (10 Feb 2020 22:21)  T(F): 98 (11 Feb 2020 13:51), Max: 99 (10 Feb 2020 22:21)  HR: 65 (11 Feb 2020 13:51) (61 - 69)  BP: 120/67 (11 Feb 2020 13:51) (120/67 - 158/75)  BP(mean): 101 (11 Feb 2020 04:57) (101 - 101)  RR: 18 (11 Feb 2020 13:51) (18 - 100)  SpO2: 96% (11 Feb 2020 13:51) (96% - 99%)    PHYSICAL EXAM:  GENERAL: NAD, well-developed  HEAD:  Atraumatic, Normocephalic  EYES: EOMI, PERRLA, conjunctiva and sclera clear  NECK: Supple, No JVD  CHEST/LUNG: Clear to auscultation bilaterally; No wheeze L sided PPM with clean dressing  HEART: Regular rate and rhythm; No murmurs, rubs, or gallops  ABDOMEN: Soft, Nontender, Nondistended; Bowel sounds present  EXTREMITIES:  2+ bipedal edema  PSYCH: AAOx3  NEUROLOGY: non-focal  SKIN: No rashes or lesions    LABS:                        9.8    7.14  )-----------( 182      ( 11 Feb 2020 06:52 )             28.6     02-11    134<L>  |  99  |  18  ----------------------------<  251<H>  4.4   |  27  |  1.33<H>    Ca    8.9      11 Feb 2020 06:52      PT/INR - ( 10 Feb 2020 05:55 )   PT: 12.4 sec;   INR: 1.08 ratio         PTT - ( 10 Feb 2020 05:55 )  PTT:27.9 sec            RADIOLOGY & ADDITIONAL TESTS:    Imaging Personally Reviewed:    Consultant(s) Notes Reviewed:      Care Discussed with Consultants/Other Providers:

## 2020-02-11 NOTE — PROGRESS NOTE ADULT - ASSESSMENT
79 f with    Bradycardia / AV block  - telemetry  - s/p PPM   - EP follow    Diabetes Mellitus  - BS control  - ADA diet     HTN control    Glaucoma  - continue gtt     Obesity morbid  - nutrition education    PT    DVT prophylaxis    d/w patient and family       DCP home when cleared by EP.    Jamal Ramachandran MD pager [phone]

## 2020-02-11 NOTE — PROGRESS NOTE ADULT - ASSESSMENT
79 year female with DM, HTN HLD, with symptomatic bradycardia, sinus bradycardia, intermittent second degree AV block. The patient is post PPM, moderate pain at site.

## 2020-02-11 NOTE — PROGRESS NOTE ADULT - SUBJECTIVE AND OBJECTIVE BOX
INTERVAL HPI/OVERNIGHT EVENTS: patient in moderate pain. No chest pain .The patient is   receiving small amount IV fluid, NS @%) cc/hr.  poor PO intake    PPM checked this AM. threshold lead impedances, P and R waves stable.    MEDICATIONS  (STANDING):  amLODIPine   Tablet 5 milliGRAM(s) Oral daily  atorvastatin 20 milliGRAM(s) Oral at bedtime  brimonidine 0.2% Ophthalmic Solution 1 Drop(s) Both EYES two times a day  dextrose 50% Injectable 12.5 Gram(s) IV Push once  dextrose 50% Injectable 25 Gram(s) IV Push once  dextrose 50% Injectable 25 Gram(s) IV Push once  dorzolamide 2%/timolol 0.5% Ophthalmic Solution 1 Drop(s) Both EYES two times a day  insulin glargine Injectable (LANTUS) 10 Unit(s) SubCutaneous at bedtime  insulin lispro (HumaLOG) corrective regimen sliding scale   SubCutaneous three times a day before meals  insulin lispro (HumaLOG) corrective regimen sliding scale   SubCutaneous at bedtime  insulin lispro Injectable (HumaLOG) 5 Unit(s) SubCutaneous before breakfast  insulin lispro Injectable (HumaLOG) 5 Unit(s) SubCutaneous before lunch  insulin lispro Injectable (HumaLOG) 5 Unit(s) SubCutaneous before dinner  latanoprost 0.005% Ophthalmic Solution 1 Drop(s) Both EYES at bedtime  sodium chloride 0.9%. 1000 milliLiter(s) (50 mL/Hr) IV Continuous <Continuous>    MEDICATIONS  (PRN):  acetaminophen   Tablet .. 650 milliGRAM(s) Oral every 6 hours PRN Temp greater or equal to 38.5C (101.3F), Mild Pain (1 - 3)  acetaminophen  IVPB .. 1000 milliGRAM(s) IV Intermittent once PRN Moderate Pain (4 - 6)  ALBUTerol    90 MICROgram(s) HFA Inhaler 2 Puff(s) Inhalation every 6 hours PRN Wheezing  dextrose 40% Gel 15 Gram(s) Oral once PRN Blood Glucose LESS THAN 70 milliGRAM(s)/deciliter  glucagon  Injectable 1 milliGRAM(s) IntraMuscular once PRN Glucose LESS THAN 70 milligrams/deciliter      Allergies    lisinopril (Hives)  penicillin (Unknown)    Intolerances      ROS:  General: Pt denies recent weight loss/fever/chills    Neurological: denies numbness or  sensation loss    Cardiovascular: denies chest pain/palpitations/leg edema    Respiratory and Thorax: denies SOB/cough/wheezing    Gastrointestinal: denies abdominal pain/diarrhea/constipation/bloody stool    Genitourinary: denies urinary frequency/urgency/ dysuria    Musculoskeletal: denies joint pain or swelling, denies restricted motion    Hematologic: denies abnormal bleeding  	    	  	    		        	    	            Vital Signs Last 24 Hrs  T(C): 36.7 (2020 13:51), Max: 37.2 (10 Feb 2020 22:21)  T(F): 98 (2020 13:51), Max: 99 (10 Feb 2020 22:21)  HR: 65 (2020 13:51) (61 - 69)  BP: 120/67 (2020 13:51) (120/67 - 158/75)  BP(mean): 101 (2020 04:57) (101 - 101)  RR: 18 (2020 13:51) (18 - 100)  SpO2: 96% (2020 13:51) (96% - 99%)  Daily     Daily Weight in k.6 (2020 10:59)    02-10 @ 07: @ 07:00  --------------------------------------------------------  IN: 640 mL / OUT: 1050 mL / NET: -410 mL     @ 07: @ 19:56  --------------------------------------------------------  IN: 610 mL / OUT: 500 mL / NET: 110 mL      Physical Exam:    wdwn female  no JVD  cor RRR  lung clear  abd soft  ext no edema  skin left upper wound clean and dry, covered with loose gauze    LABS:                        9.8    7.14  )-----------( 182      ( 2020 06:52 )             28.6         134<L>  |  99  |  18  ----------------------------<  251<H>  4.4   |  27  |  1.33<H>    Ca    8.9      2020 06:52      PT/INR - ( 10 Feb 2020 05:55 )   PT: 12.4 sec;   INR: 1.08 ratio         PTT - ( 10 Feb 2020 05:55 )  PTT:27.9 sec      RADIOLOGY & ADDITIONAL TESTS:    TELE:    EKG:

## 2020-02-12 LAB
ANION GAP SERPL CALC-SCNC: 9 MMOL/L — SIGNIFICANT CHANGE UP (ref 5–17)
BUN SERPL-MCNC: 22 MG/DL — SIGNIFICANT CHANGE UP (ref 7–23)
CALCIUM SERPL-MCNC: 9 MG/DL — SIGNIFICANT CHANGE UP (ref 8.4–10.5)
CHLORIDE SERPL-SCNC: 98 MMOL/L — SIGNIFICANT CHANGE UP (ref 96–108)
CO2 SERPL-SCNC: 26 MMOL/L — SIGNIFICANT CHANGE UP (ref 22–31)
CREAT SERPL-MCNC: 1.4 MG/DL — HIGH (ref 0.5–1.3)
GLUCOSE BLDC GLUCOMTR-MCNC: 118 MG/DL — HIGH (ref 70–99)
GLUCOSE BLDC GLUCOMTR-MCNC: 168 MG/DL — HIGH (ref 70–99)
GLUCOSE BLDC GLUCOMTR-MCNC: 179 MG/DL — HIGH (ref 70–99)
GLUCOSE BLDC GLUCOMTR-MCNC: 212 MG/DL — HIGH (ref 70–99)
GLUCOSE SERPL-MCNC: 203 MG/DL — HIGH (ref 70–99)
POTASSIUM SERPL-MCNC: 4.4 MMOL/L — SIGNIFICANT CHANGE UP (ref 3.5–5.3)
POTASSIUM SERPL-SCNC: 4.4 MMOL/L — SIGNIFICANT CHANGE UP (ref 3.5–5.3)
SODIUM SERPL-SCNC: 133 MMOL/L — LOW (ref 135–145)

## 2020-02-12 RX ORDER — ACETAMINOPHEN 500 MG
1000 TABLET ORAL ONCE
Refills: 0 | Status: COMPLETED | OUTPATIENT
Start: 2020-02-12 | End: 2020-02-12

## 2020-02-12 RX ORDER — POLYETHYLENE GLYCOL 3350 17 G/17G
17 POWDER, FOR SOLUTION ORAL DAILY
Refills: 0 | Status: DISCONTINUED | OUTPATIENT
Start: 2020-02-12 | End: 2020-02-13

## 2020-02-12 RX ORDER — SODIUM CHLORIDE 9 MG/ML
1000 INJECTION INTRAMUSCULAR; INTRAVENOUS; SUBCUTANEOUS
Refills: 0 | Status: DISCONTINUED | OUTPATIENT
Start: 2020-02-12 | End: 2020-02-13

## 2020-02-12 RX ADMIN — SODIUM CHLORIDE 50 MILLILITER(S): 9 INJECTION INTRAMUSCULAR; INTRAVENOUS; SUBCUTANEOUS at 19:18

## 2020-02-12 RX ADMIN — AMLODIPINE BESYLATE 5 MILLIGRAM(S): 2.5 TABLET ORAL at 05:06

## 2020-02-12 RX ADMIN — Medication 1 TABLET(S): at 06:04

## 2020-02-12 RX ADMIN — Medication 2: at 11:56

## 2020-02-12 RX ADMIN — BRIMONIDINE TARTRATE 1 DROP(S): 2 SOLUTION/ DROPS OPHTHALMIC at 05:05

## 2020-02-12 RX ADMIN — INSULIN GLARGINE 10 UNIT(S): 100 INJECTION, SOLUTION SUBCUTANEOUS at 21:57

## 2020-02-12 RX ADMIN — Medication 400 MILLIGRAM(S): at 05:58

## 2020-02-12 RX ADMIN — Medication 2: at 08:42

## 2020-02-12 RX ADMIN — Medication 1000 MILLIGRAM(S): at 06:28

## 2020-02-12 RX ADMIN — Medication 5 UNIT(S): at 08:42

## 2020-02-12 RX ADMIN — Medication 400 MILLIGRAM(S): at 19:18

## 2020-02-12 RX ADMIN — DORZOLAMIDE HYDROCHLORIDE TIMOLOL MALEATE 1 DROP(S): 20; 5 SOLUTION/ DROPS OPHTHALMIC at 05:05

## 2020-02-12 RX ADMIN — Medication 5 UNIT(S): at 11:56

## 2020-02-12 RX ADMIN — Medication 1000 MILLIGRAM(S): at 19:35

## 2020-02-12 RX ADMIN — LATANOPROST 1 DROP(S): 0.05 SOLUTION/ DROPS OPHTHALMIC; TOPICAL at 21:58

## 2020-02-12 RX ADMIN — Medication 5 UNIT(S): at 17:29

## 2020-02-12 RX ADMIN — BRIMONIDINE TARTRATE 1 DROP(S): 2 SOLUTION/ DROPS OPHTHALMIC at 19:23

## 2020-02-12 RX ADMIN — POLYETHYLENE GLYCOL 3350 17 GRAM(S): 17 POWDER, FOR SOLUTION ORAL at 08:45

## 2020-02-12 RX ADMIN — ATORVASTATIN CALCIUM 20 MILLIGRAM(S): 80 TABLET, FILM COATED ORAL at 21:58

## 2020-02-12 RX ADMIN — DORZOLAMIDE HYDROCHLORIDE TIMOLOL MALEATE 1 DROP(S): 20; 5 SOLUTION/ DROPS OPHTHALMIC at 19:23

## 2020-02-12 NOTE — PROGRESS NOTE ADULT - ASSESSMENT
79 f with    Bradycardia / AV block  - telemetry  - s/p PPM   - EP follow    Diabetes Mellitus  - BS control  - ADA diet     HTN control    Glaucoma  - continue gtt     Obesity morbid  - nutrition education    PT    DVT prophylaxis    d/w patient and family       DCP rehab when cleared by EP.    Jamal Ramachandran MD pager [phone]

## 2020-02-12 NOTE — PROGRESS NOTE ADULT - SUBJECTIVE AND OBJECTIVE BOX
Patient is a 79y old  Female who presents with a chief complaint of sob (11 Feb 2020 19:50)      SUBJECTIVE / OVERNIGHT EVENTS: No new complaints.   Review of Systems  chest pain no  palpitations no  sob no  nausea no  headache no    MEDICATIONS  (STANDING):  amLODIPine   Tablet 5 milliGRAM(s) Oral daily  atorvastatin 20 milliGRAM(s) Oral at bedtime  brimonidine 0.2% Ophthalmic Solution 1 Drop(s) Both EYES two times a day  dextrose 50% Injectable 12.5 Gram(s) IV Push once  dextrose 50% Injectable 25 Gram(s) IV Push once  dextrose 50% Injectable 25 Gram(s) IV Push once  dorzolamide 2%/timolol 0.5% Ophthalmic Solution 1 Drop(s) Both EYES two times a day  insulin glargine Injectable (LANTUS) 10 Unit(s) SubCutaneous at bedtime  insulin lispro (HumaLOG) corrective regimen sliding scale   SubCutaneous three times a day before meals  insulin lispro (HumaLOG) corrective regimen sliding scale   SubCutaneous at bedtime  insulin lispro Injectable (HumaLOG) 5 Unit(s) SubCutaneous before breakfast  insulin lispro Injectable (HumaLOG) 5 Unit(s) SubCutaneous before lunch  insulin lispro Injectable (HumaLOG) 5 Unit(s) SubCutaneous before dinner  latanoprost 0.005% Ophthalmic Solution 1 Drop(s) Both EYES at bedtime  polyethylene glycol 3350 17 Gram(s) Oral daily  sodium chloride 0.9%. 1000 milliLiter(s) (50 mL/Hr) IV Continuous <Continuous>    MEDICATIONS  (PRN):  acetaminophen   Tablet .. 650 milliGRAM(s) Oral every 6 hours PRN Temp greater or equal to 38.5C (101.3F), Mild Pain (1 - 3)  ALBUTerol    90 MICROgram(s) HFA Inhaler 2 Puff(s) Inhalation every 6 hours PRN Wheezing  dextrose 40% Gel 15 Gram(s) Oral once PRN Blood Glucose LESS THAN 70 milliGRAM(s)/deciliter  glucagon  Injectable 1 milliGRAM(s) IntraMuscular once PRN Glucose LESS THAN 70 milligrams/deciliter      Vital Signs Last 24 Hrs  T(C): 36.7 (12 Feb 2020 19:50), Max: 36.7 (12 Feb 2020 00:18)  T(F): 98 (12 Feb 2020 19:50), Max: 98.1 (12 Feb 2020 00:18)  HR: 71 (12 Feb 2020 19:50) (64 - 76)  BP: 121/62 (12 Feb 2020 19:50) (121/62 - 136/72)  BP(mean): --  RR: 18 (12 Feb 2020 19:50) (18 - 18)  SpO2: 95% (12 Feb 2020 19:50) (95% - 98%)    PHYSICAL EXAM:  GENERAL: NAD, well-developed  HEAD:  Atraumatic, Normocephalic  EYES: EOMI, PERRLA, conjunctiva and sclera clear  NECK: Supple, No JVD  CHEST/LUNG: Clear to auscultation bilaterally; No wheeze PPM site is clean.  HEART: Regular rate and rhythm; No murmurs, rubs, or gallops  ABDOMEN: Soft, Nontender, Nondistended; Bowel sounds present  EXTREMITIES:  2+ bipedal edema  PSYCH: AAOx3  NEUROLOGY: non-focal  SKIN: No rashes or lesions    LABS:                        9.8    7.14  )-----------( 182      ( 11 Feb 2020 06:52 )             28.6     02-12    133<L>  |  98  |  22  ----------------------------<  203<H>  4.4   |  26  |  1.40<H>    Ca    9.0      12 Feb 2020 06:32                  RADIOLOGY & ADDITIONAL TESTS:    Imaging Personally Reviewed:    Consultant(s) Notes Reviewed:      Care Discussed with Consultants/Other Providers:

## 2020-02-12 NOTE — PROGRESS NOTE ADULT - SUBJECTIVE AND OBJECTIVE BOX
INTERVAL HPI/OVERNIGHT EVENTS: patient noted with mild increase creatinine 1.4, more alert today, less pain evaluated by physical therapy, felt to be in need of inpatient rehabilitation    MEDICATIONS  (STANDING):  amLODIPine   Tablet 5 milliGRAM(s) Oral daily  atorvastatin 20 milliGRAM(s) Oral at bedtime  brimonidine 0.2% Ophthalmic Solution 1 Drop(s) Both EYES two times a day  dextrose 50% Injectable 12.5 Gram(s) IV Push once  dextrose 50% Injectable 25 Gram(s) IV Push once  dextrose 50% Injectable 25 Gram(s) IV Push once  dorzolamide 2%/timolol 0.5% Ophthalmic Solution 1 Drop(s) Both EYES two times a day  insulin glargine Injectable (LANTUS) 10 Unit(s) SubCutaneous at bedtime  insulin lispro (HumaLOG) corrective regimen sliding scale   SubCutaneous three times a day before meals  insulin lispro (HumaLOG) corrective regimen sliding scale   SubCutaneous at bedtime  insulin lispro Injectable (HumaLOG) 5 Unit(s) SubCutaneous before breakfast  insulin lispro Injectable (HumaLOG) 5 Unit(s) SubCutaneous before lunch  insulin lispro Injectable (HumaLOG) 5 Unit(s) SubCutaneous before dinner  latanoprost 0.005% Ophthalmic Solution 1 Drop(s) Both EYES at bedtime  polyethylene glycol 3350 17 Gram(s) Oral daily  sodium chloride 0.9%. 1000 milliLiter(s) (50 mL/Hr) IV Continuous <Continuous>    MEDICATIONS  (PRN):  acetaminophen   Tablet .. 650 milliGRAM(s) Oral every 6 hours PRN Temp greater or equal to 38.5C (101.3F), Mild Pain (1 - 3)  ALBUTerol    90 MICROgram(s) HFA Inhaler 2 Puff(s) Inhalation every 6 hours PRN Wheezing  dextrose 40% Gel 15 Gram(s) Oral once PRN Blood Glucose LESS THAN 70 milliGRAM(s)/deciliter  glucagon  Injectable 1 milliGRAM(s) IntraMuscular once PRN Glucose LESS THAN 70 milligrams/deciliter      Allergies    lisinopril (Hives)  penicillin (Unknown)    Intolerances      ROS:  General: Pt denies recent weight loss/fever/chills    Neurological: denies numbness or  sensation loss    Cardiovascular: denies chest pain/palpitations/leg edema    Respiratory and Thorax: denies SOB/cough/wheezing    Gastrointestinal: denies abdominal pain/diarrhea/constipation/bloody stool    Genitourinary: denies urinary frequency/urgency/ dysuria    Musculoskeletal: denies joint pain or swelling, denies restricted motion    Hematologic: denies abnormal bleeding  	    	  	    		        	    	            Vital Signs Last 24 Hrs  T(C): 36.7 (2020 19:50), Max: 36.7 (2020 00:18)  T(F): 98 (2020 19:50), Max: 98.1 (2020 00:18)  HR: 71 (2020 19:50) (64 - 76)  BP: 121/62 (2020 19:50) (121/62 - 136/72)  BP(mean): --  RR: 18 (2020 19:50) (18 - 18)  SpO2: 95% (2020 19:50) (95% - 98%)  Daily     Daily Weight in k.8 (2020 07:07)     @ 07:01  -   @ 07:00  --------------------------------------------------------  IN: 1560 mL / OUT: 500 mL / NET: 1060 mL     @ 07:01  -   @ 20:38  --------------------------------------------------------  IN: 600 mL / OUT: 400 mL / NET: 200 mL      Physical Exam:    wdwn female  no JVD  corRRR  lung clear  abd soft   ext no edema  left sided chest wound clean and dry      LABS:                        9.8    7.14  )-----------( 182      ( 2020 06:52 )             28.6         133<L>  |  98  |  22  ----------------------------<  203<H>  4.4   |  26  |  1.40<H>    Ca    9.0      2020 06:32            RADIOLOGY & ADDITIONAL TESTS:    TELE:    EKG:

## 2020-02-12 NOTE — PHYSICAL THERAPY INITIAL EVALUATION ADULT - PERTINENT HX OF CURRENT PROBLEM, REHAB EVAL
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 Syed Lora) for admission for PPM on 2/10/2020. Pt endorsed dyspnea on exertional and intermittent chest discomfort, substernal, without radiation for past few days. Currently s/p PPM placement on 2/10.

## 2020-02-12 NOTE — PHYSICAL THERAPY INITIAL EVALUATION ADULT - ADDITIONAL COMMENTS
Pt states she lives in a private house with her daughter. There are 3 steps to enter the house and 2 flights of stairs to the upstairs bedroom. At baseline, pt ambulates with a rollator/straight cane outside the house and no assistive device when inside the house.

## 2020-02-12 NOTE — PHYSICAL THERAPY INITIAL EVALUATION ADULT - PLANNED THERAPY INTERVENTIONS, PT EVAL
bed mobility training/gait training/transfer training/1. GOAL: Pt will be able to negotiate 16 steps +HR independently with reciprocal pattern in 3 weeks.

## 2020-02-13 VITALS
DIASTOLIC BLOOD PRESSURE: 67 MMHG | OXYGEN SATURATION: 98 % | SYSTOLIC BLOOD PRESSURE: 151 MMHG | RESPIRATION RATE: 18 BRPM | HEART RATE: 66 BPM

## 2020-02-13 LAB
ANION GAP SERPL CALC-SCNC: 10 MMOL/L — SIGNIFICANT CHANGE UP (ref 5–17)
BUN SERPL-MCNC: 22 MG/DL — SIGNIFICANT CHANGE UP (ref 7–23)
CALCIUM SERPL-MCNC: 9.1 MG/DL — SIGNIFICANT CHANGE UP (ref 8.4–10.5)
CHLORIDE SERPL-SCNC: 101 MMOL/L — SIGNIFICANT CHANGE UP (ref 96–108)
CO2 SERPL-SCNC: 26 MMOL/L — SIGNIFICANT CHANGE UP (ref 22–31)
CREAT SERPL-MCNC: 1.24 MG/DL — SIGNIFICANT CHANGE UP (ref 0.5–1.3)
GLUCOSE BLDC GLUCOMTR-MCNC: 126 MG/DL — HIGH (ref 70–99)
GLUCOSE BLDC GLUCOMTR-MCNC: 183 MG/DL — HIGH (ref 70–99)
GLUCOSE SERPL-MCNC: 158 MG/DL — HIGH (ref 70–99)
HCT VFR BLD CALC: 28.5 % — LOW (ref 34.5–45)
HGB BLD-MCNC: 10 G/DL — LOW (ref 11.5–15.5)
MAGNESIUM SERPL-MCNC: 2 MG/DL — SIGNIFICANT CHANGE UP (ref 1.6–2.6)
MCHC RBC-ENTMCNC: 27.5 PG — SIGNIFICANT CHANGE UP (ref 27–34)
MCHC RBC-ENTMCNC: 35.1 GM/DL — SIGNIFICANT CHANGE UP (ref 32–36)
MCV RBC AUTO: 78.5 FL — LOW (ref 80–100)
NRBC # BLD: 0 /100 WBCS — SIGNIFICANT CHANGE UP (ref 0–0)
PLATELET # BLD AUTO: 162 K/UL — SIGNIFICANT CHANGE UP (ref 150–400)
POTASSIUM SERPL-MCNC: 4.4 MMOL/L — SIGNIFICANT CHANGE UP (ref 3.5–5.3)
POTASSIUM SERPL-SCNC: 4.4 MMOL/L — SIGNIFICANT CHANGE UP (ref 3.5–5.3)
RBC # BLD: 3.63 M/UL — LOW (ref 3.8–5.2)
RBC # FLD: 14.7 % — HIGH (ref 10.3–14.5)
SODIUM SERPL-SCNC: 137 MMOL/L — SIGNIFICANT CHANGE UP (ref 135–145)
WBC # BLD: 7.69 K/UL — SIGNIFICANT CHANGE UP (ref 3.8–10.5)
WBC # FLD AUTO: 7.69 K/UL — SIGNIFICANT CHANGE UP (ref 3.8–10.5)

## 2020-02-13 PROCEDURE — 82330 ASSAY OF CALCIUM: CPT

## 2020-02-13 PROCEDURE — 84132 ASSAY OF SERUM POTASSIUM: CPT

## 2020-02-13 PROCEDURE — 94640 AIRWAY INHALATION TREATMENT: CPT

## 2020-02-13 PROCEDURE — 82962 GLUCOSE BLOOD TEST: CPT

## 2020-02-13 PROCEDURE — 82607 VITAMIN B-12: CPT

## 2020-02-13 PROCEDURE — 85730 THROMBOPLASTIN TIME PARTIAL: CPT

## 2020-02-13 PROCEDURE — 86901 BLOOD TYPING SEROLOGIC RH(D): CPT

## 2020-02-13 PROCEDURE — 83880 ASSAY OF NATRIURETIC PEPTIDE: CPT

## 2020-02-13 PROCEDURE — 82435 ASSAY OF BLOOD CHLORIDE: CPT

## 2020-02-13 PROCEDURE — 83036 HEMOGLOBIN GLYCOSYLATED A1C: CPT

## 2020-02-13 PROCEDURE — 83550 IRON BINDING TEST: CPT

## 2020-02-13 PROCEDURE — C1892: CPT

## 2020-02-13 PROCEDURE — 84439 ASSAY OF FREE THYROXINE: CPT

## 2020-02-13 PROCEDURE — 85014 HEMATOCRIT: CPT

## 2020-02-13 PROCEDURE — 83735 ASSAY OF MAGNESIUM: CPT

## 2020-02-13 PROCEDURE — 99238 HOSP IP/OBS DSCHRG MGMT 30/<: CPT

## 2020-02-13 PROCEDURE — C1785: CPT

## 2020-02-13 PROCEDURE — 82746 ASSAY OF FOLIC ACID SERUM: CPT

## 2020-02-13 PROCEDURE — 84484 ASSAY OF TROPONIN QUANT: CPT

## 2020-02-13 PROCEDURE — 82803 BLOOD GASES ANY COMBINATION: CPT

## 2020-02-13 PROCEDURE — 85610 PROTHROMBIN TIME: CPT

## 2020-02-13 PROCEDURE — 86900 BLOOD TYPING SEROLOGIC ABO: CPT

## 2020-02-13 PROCEDURE — 80053 COMPREHEN METABOLIC PANEL: CPT

## 2020-02-13 PROCEDURE — 86850 RBC ANTIBODY SCREEN: CPT

## 2020-02-13 PROCEDURE — C1769: CPT

## 2020-02-13 PROCEDURE — 84443 ASSAY THYROID STIM HORMONE: CPT

## 2020-02-13 PROCEDURE — 80048 BASIC METABOLIC PNL TOTAL CA: CPT

## 2020-02-13 PROCEDURE — 82728 ASSAY OF FERRITIN: CPT

## 2020-02-13 PROCEDURE — 84295 ASSAY OF SERUM SODIUM: CPT

## 2020-02-13 PROCEDURE — 82553 CREATINE MB FRACTION: CPT

## 2020-02-13 PROCEDURE — 82550 ASSAY OF CK (CPK): CPT

## 2020-02-13 PROCEDURE — 33208 INSRT HEART PM ATRIAL & VENT: CPT

## 2020-02-13 PROCEDURE — 71046 X-RAY EXAM CHEST 2 VIEWS: CPT

## 2020-02-13 PROCEDURE — 99285 EMERGENCY DEPT VISIT HI MDM: CPT | Mod: 25

## 2020-02-13 PROCEDURE — 93005 ELECTROCARDIOGRAM TRACING: CPT

## 2020-02-13 PROCEDURE — 71045 X-RAY EXAM CHEST 1 VIEW: CPT

## 2020-02-13 PROCEDURE — C1898: CPT

## 2020-02-13 PROCEDURE — 97161 PT EVAL LOW COMPLEX 20 MIN: CPT

## 2020-02-13 PROCEDURE — 84100 ASSAY OF PHOSPHORUS: CPT

## 2020-02-13 PROCEDURE — 82947 ASSAY GLUCOSE BLOOD QUANT: CPT

## 2020-02-13 PROCEDURE — 83605 ASSAY OF LACTIC ACID: CPT

## 2020-02-13 PROCEDURE — 83540 ASSAY OF IRON: CPT

## 2020-02-13 PROCEDURE — C1894: CPT

## 2020-02-13 PROCEDURE — 85027 COMPLETE CBC AUTOMATED: CPT

## 2020-02-13 RX ORDER — AMLODIPINE BESYLATE 2.5 MG/1
2.5 TABLET ORAL DAILY
Refills: 0 | Status: DISCONTINUED | OUTPATIENT
Start: 2020-02-13 | End: 2020-02-13

## 2020-02-13 RX ORDER — ALBUTEROL 90 UG/1
2 AEROSOL, METERED ORAL
Qty: 0 | Refills: 0 | DISCHARGE

## 2020-02-13 RX ORDER — AMLODIPINE BESYLATE 2.5 MG/1
5 TABLET ORAL DAILY
Refills: 0 | Status: DISCONTINUED | OUTPATIENT
Start: 2020-02-13 | End: 2020-02-13

## 2020-02-13 RX ORDER — AMLODIPINE BESYLATE 2.5 MG/1
1 TABLET ORAL
Qty: 0 | Refills: 0 | DISCHARGE
Start: 2020-02-13

## 2020-02-13 RX ORDER — ATORVASTATIN CALCIUM 80 MG/1
1 TABLET, FILM COATED ORAL
Qty: 0 | Refills: 0 | DISCHARGE

## 2020-02-13 RX ORDER — HYDRALAZINE HCL 50 MG
1 TABLET ORAL
Qty: 0 | Refills: 0 | DISCHARGE

## 2020-02-13 RX ORDER — ALBUTEROL 90 UG/1
2 AEROSOL, METERED ORAL
Qty: 0 | Refills: 0 | DISCHARGE
Start: 2020-02-13

## 2020-02-13 RX ORDER — ISOSORBIDE MONONITRATE 60 MG/1
1 TABLET, EXTENDED RELEASE ORAL
Qty: 0 | Refills: 0 | DISCHARGE

## 2020-02-13 RX ORDER — ACETAMINOPHEN 500 MG
2 TABLET ORAL
Qty: 0 | Refills: 0 | DISCHARGE
Start: 2020-02-13

## 2020-02-13 RX ORDER — ATORVASTATIN CALCIUM 80 MG/1
1 TABLET, FILM COATED ORAL
Qty: 0 | Refills: 0 | DISCHARGE
Start: 2020-02-13

## 2020-02-13 RX ORDER — LOSARTAN POTASSIUM 100 MG/1
1 TABLET, FILM COATED ORAL
Qty: 0 | Refills: 0 | DISCHARGE

## 2020-02-13 RX ORDER — AMLODIPINE BESYLATE 2.5 MG/1
1 TABLET ORAL
Qty: 0 | Refills: 0 | DISCHARGE

## 2020-02-13 RX ORDER — POLYETHYLENE GLYCOL 3350 17 G/17G
17 POWDER, FOR SOLUTION ORAL
Qty: 0 | Refills: 0 | DISCHARGE
Start: 2020-02-13

## 2020-02-13 RX ADMIN — Medication 2: at 07:59

## 2020-02-13 RX ADMIN — Medication 650 MILLIGRAM(S): at 05:39

## 2020-02-13 RX ADMIN — Medication 5 UNIT(S): at 07:59

## 2020-02-13 RX ADMIN — DORZOLAMIDE HYDROCHLORIDE TIMOLOL MALEATE 1 DROP(S): 20; 5 SOLUTION/ DROPS OPHTHALMIC at 06:08

## 2020-02-13 RX ADMIN — BRIMONIDINE TARTRATE 1 DROP(S): 2 SOLUTION/ DROPS OPHTHALMIC at 06:07

## 2020-02-13 RX ADMIN — Medication 5 UNIT(S): at 13:46

## 2020-02-13 RX ADMIN — Medication 650 MILLIGRAM(S): at 05:09

## 2020-02-13 RX ADMIN — AMLODIPINE BESYLATE 2.5 MILLIGRAM(S): 2.5 TABLET ORAL at 11:18

## 2020-02-13 NOTE — DISCHARGE NOTE PROVIDER - NSDCMRMEDTOKEN_GEN_ALL_CORE_FT
acetaminophen 325 mg oral tablet: 2 tab(s) orally every 6 hours, As needed, Temp greater or equal to 38.5C (101.3F), Mild Pain (1 - 3)  albuterol 90 mcg/inh inhalation aerosol: 2 puff(s) inhaled every 6 hours, As needed, Wheezing  amLODIPine 5 mg oral tablet: 1 tab(s) orally once a day  atorvastatin 40 mg oral tablet: 1 tab(s) orally once a day  brimonidine 0.15% ophthalmic solution: 1 drop(s) to both eyes 2 times a day  dorzolamide-timolol 2.23%-0.68% ophthalmic solution: 1 drop(s) to both eyes 2 times a day  HumuLIN 70/30 KwikPen 70 units-30 units/mL subcutaneous suspension: 20 units in AM and 10 units in PM  Januvia 25 mg oral tablet: 1 tab(s) orally once a day  latanoprost 0.005% ophthalmic solution: 1 drop(s) to both eyes once a day (in the evening)  polyethylene glycol 3350 oral powder for reconstitution: 17 gram(s) orally once a day acetaminophen 325 mg oral tablet: 2 tab(s) orally every 6 hours, As needed, Temp greater or equal to 38.5C (101.3F), Mild Pain (1 - 3)  albuterol 90 mcg/inh inhalation aerosol: 2 puff(s) inhaled every 6 hours, As needed, Wheezing  amLODIPine 2.5 mg oral tablet: 1 tab(s) orally once a day  atorvastatin 20 mg oral tablet: 1 tab(s) orally once a day (at bedtime)  brimonidine 0.15% ophthalmic solution: 1 drop(s) to both eyes 2 times a day  dorzolamide-timolol 2.23%-0.68% ophthalmic solution: 1 drop(s) to both eyes 2 times a day  HumuLIN 70/30 KwikPen 70 units-30 units/mL subcutaneous suspension: 20 units in AM and 10 units in PM  Januvia 25 mg oral tablet: 1 tab(s) orally once a day  latanoprost 0.005% ophthalmic solution: 1 drop(s) to both eyes once a day (in the evening)  polyethylene glycol 3350 oral powder for reconstitution: 17 gram(s) orally once a day

## 2020-02-13 NOTE — PROGRESS NOTE ADULT - ASSESSMENT
79 year female with DM, HTN HLD, with symptomatic bradycardia, sinus bradycardia, intermittent second degree AV block. The patient is post PPM, pain decreased at site. creatinine improved

## 2020-02-13 NOTE — DISCHARGE NOTE NURSING/CASE MANAGEMENT/SOCIAL WORK - NSDCFUADDAPPT_GEN_ALL_CORE_FT
Please follow up with your PCP/EP, Dr. Lora within a week for further management including repeating lab to monitor your renal function and pacemaker check.

## 2020-02-13 NOTE — PROGRESS NOTE ADULT - ASSESSMENT
79 f with    Bradycardia / AV block  - telemetry  - s/p PPM   - EP follow. Cleared for DC    Diabetes Mellitus  - BS control  - ADA diet     HTN control    Glaucoma  - continue gtt     Obesity morbid  - nutrition education    PT    DVT prophylaxis    d/w patient      DC rehab.     Jamal Ramachandran MD pager [phone]

## 2020-02-13 NOTE — DISCHARGE NOTE NURSING/CASE MANAGEMENT/SOCIAL WORK - PATIENT PORTAL LINK FT
You can access the FollowMyHealth Patient Portal offered by [facility] by registering at the following website: [email or web address]. By joining [facility]’s FollowMyHealth portal, you will also be able to view your health information using other applications (apps) compatible with our system.

## 2020-02-13 NOTE — DISCHARGE NOTE PROVIDER - CARE PROVIDER_API CALL
Syed Lora)  Cardiology  [address], [address]  [place] [address]  Phone: [phone]  Fax: [phone]  Follow Up Time: 1 week

## 2020-02-13 NOTE — DISCHARGE NOTE PROVIDER - NSDCFUADDAPPT_GEN_ALL_CORE_FT
Please follow up with your PCP/EP, Dr. Lora in a week after being discharged from rehab for further management including repeating lab to monitor your renal function and pacemaker check. Please follow up with your PCP/EP, Dr. Lora within a week for further management including repeating lab to monitor your renal function and pacemaker check.

## 2020-02-13 NOTE — DISCHARGE NOTE PROVIDER - HOSPITAL COURSE
79 year female with DM, HTN HLD, with symptomatic bradycardia, sinus bradycardia, intermittent second degree AV block. Seen and evaluated by EP. s/p PPM without complication. Hospital course complicated by LIANNA which is resolved s/p IVF. Cardiac meds adjusted. Patient remains stable for DC to rehab with close follow up with PCP/EP as per Dr. Ramachandran. 79 year female with DM, HTN HLD, with symptomatic bradycardia, sinus bradycardia, intermittent second degree AV block. Seen and evaluated by EP. s/p PPM without complication. Cardiac meds adjusted. Patient remains stable for DC to rehab with close follow up with PCP/EP as per Dr. Ramachandran. 79 year female with DM, HTN HLD, with symptomatic bradycardia, sinus bradycardia, intermittent second degree AV block. Seen and evaluated by EP. s/p PPM without complication. Cardiac meds adjusted. BMI 48.4, diet changes and life style modification recommended. Patient remains stable for DC to rehab with close follow up with PCP/EP as per Dr. Ramachandran.

## 2020-02-13 NOTE — PROGRESS NOTE ADULT - SUBJECTIVE AND OBJECTIVE BOX
Patient is a 79y old  Female who presents with a chief complaint of sob (13 Feb 2020 11:05)      SUBJECTIVE / OVERNIGHT EVENTS: Comfortable without new complaints.   Review of Systems  chest pain no  palpitations no  sob no  nausea no  headache no    MEDICATIONS  (STANDING):  amLODIPine   Tablet 2.5 milliGRAM(s) Oral daily  atorvastatin 20 milliGRAM(s) Oral at bedtime  brimonidine 0.2% Ophthalmic Solution 1 Drop(s) Both EYES two times a day  dextrose 50% Injectable 12.5 Gram(s) IV Push once  dextrose 50% Injectable 25 Gram(s) IV Push once  dextrose 50% Injectable 25 Gram(s) IV Push once  dorzolamide 2%/timolol 0.5% Ophthalmic Solution 1 Drop(s) Both EYES two times a day  insulin glargine Injectable (LANTUS) 10 Unit(s) SubCutaneous at bedtime  insulin lispro (HumaLOG) corrective regimen sliding scale   SubCutaneous three times a day before meals  insulin lispro (HumaLOG) corrective regimen sliding scale   SubCutaneous at bedtime  insulin lispro Injectable (HumaLOG) 5 Unit(s) SubCutaneous before breakfast  insulin lispro Injectable (HumaLOG) 5 Unit(s) SubCutaneous before lunch  insulin lispro Injectable (HumaLOG) 5 Unit(s) SubCutaneous before dinner  latanoprost 0.005% Ophthalmic Solution 1 Drop(s) Both EYES at bedtime  polyethylene glycol 3350 17 Gram(s) Oral daily  sodium chloride 0.9%. 1000 milliLiter(s) (50 mL/Hr) IV Continuous <Continuous>    MEDICATIONS  (PRN):  acetaminophen   Tablet .. 650 milliGRAM(s) Oral every 6 hours PRN Temp greater or equal to 38.5C (101.3F), Mild Pain (1 - 3)  ALBUTerol    90 MICROgram(s) HFA Inhaler 2 Puff(s) Inhalation every 6 hours PRN Wheezing  dextrose 40% Gel 15 Gram(s) Oral once PRN Blood Glucose LESS THAN 70 milliGRAM(s)/deciliter  glucagon  Injectable 1 milliGRAM(s) IntraMuscular once PRN Glucose LESS THAN 70 milligrams/deciliter      Vital Signs Last 24 Hrs  T(C): 36.6 (13 Feb 2020 05:07), Max: 36.7 (12 Feb 2020 19:50)  T(F): 97.9 (13 Feb 2020 05:07), Max: 98 (12 Feb 2020 19:50)  HR: 68 (13 Feb 2020 05:07) (68 - 76)  BP: 140/78 (13 Feb 2020 07:06) (121/62 - 166/75)  BP(mean): --  RR: 18 (13 Feb 2020 05:07) (18 - 18)  SpO2: 98% (13 Feb 2020 05:07) (95% - 98%)    PHYSICAL EXAM:  GENERAL: NAD, well-developed  HEAD:  Atraumatic, Normocephalic  EYES: EOMI, PERRLA, conjunctiva and sclera clear  NECK: Supple, No JVD  CHEST/LUNG: Clear to auscultation bilaterally; No wheeze  HEART: Regular rate and rhythm; No murmurs, rubs, or gallops  ABDOMEN: Soft, Nontender, Nondistended; Bowel sounds present  EXTREMITIES:  2+ Peripheral Pulses, No clubbing, cyanosis, or edema  PSYCH: AAOx3  NEUROLOGY: non-focal  SKIN: No rashes or lesions    LABS:                        10.0   7.69  )-----------( 162      ( 13 Feb 2020 06:57 )             28.5     02-13    137  |  101  |  22  ----------------------------<  158<H>  4.4   |  26  |  1.24    Ca    9.1      13 Feb 2020 06:57  Mg     2.0     02-13          Telemetry Paced        RADIOLOGY & ADDITIONAL TESTS:    Imaging Personally Reviewed:    Consultant(s) Notes Reviewed:      Care Discussed with Consultants/Other Providers:

## 2020-02-13 NOTE — PROGRESS NOTE ADULT - PROBLEM SELECTOR PLAN 1
pacemaker interogated again today, all parameters within normal limits. tylenol for pain. followup in one week

## 2020-02-13 NOTE — PROGRESS NOTE ADULT - SUBJECTIVE AND OBJECTIVE BOX
INTERVAL HPI/OVERNIGHT EVENTS:patient noted fatigued, awake, no chest pain. creatinine improved.    MEDICATIONS  (STANDING):  amLODIPine   Tablet 2.5 milliGRAM(s) Oral daily  atorvastatin 20 milliGRAM(s) Oral at bedtime  brimonidine 0.2% Ophthalmic Solution 1 Drop(s) Both EYES two times a day  dextrose 50% Injectable 12.5 Gram(s) IV Push once  dextrose 50% Injectable 25 Gram(s) IV Push once  dextrose 50% Injectable 25 Gram(s) IV Push once  dorzolamide 2%/timolol 0.5% Ophthalmic Solution 1 Drop(s) Both EYES two times a day  insulin glargine Injectable (LANTUS) 10 Unit(s) SubCutaneous at bedtime  insulin lispro (HumaLOG) corrective regimen sliding scale   SubCutaneous three times a day before meals  insulin lispro (HumaLOG) corrective regimen sliding scale   SubCutaneous at bedtime  insulin lispro Injectable (HumaLOG) 5 Unit(s) SubCutaneous before breakfast  insulin lispro Injectable (HumaLOG) 5 Unit(s) SubCutaneous before lunch  insulin lispro Injectable (HumaLOG) 5 Unit(s) SubCutaneous before dinner  latanoprost 0.005% Ophthalmic Solution 1 Drop(s) Both EYES at bedtime  polyethylene glycol 3350 17 Gram(s) Oral daily  sodium chloride 0.9%. 1000 milliLiter(s) (50 mL/Hr) IV Continuous <Continuous>    MEDICATIONS  (PRN):  acetaminophen   Tablet .. 650 milliGRAM(s) Oral every 6 hours PRN Temp greater or equal to 38.5C (101.3F), Mild Pain (1 - 3)  ALBUTerol    90 MICROgram(s) HFA Inhaler 2 Puff(s) Inhalation every 6 hours PRN Wheezing  dextrose 40% Gel 15 Gram(s) Oral once PRN Blood Glucose LESS THAN 70 milliGRAM(s)/deciliter  glucagon  Injectable 1 milliGRAM(s) IntraMuscular once PRN Glucose LESS THAN 70 milligrams/deciliter      Allergies    lisinopril (Hives)  penicillin (Unknown)    Intolerances      ROS:  General: Pt denies recent weight loss/fever/chills    Neurological: denies numbness or  sensation loss    Cardiovascular: denies chest pain/palpitations/leg edema    Respiratory and Thorax: denies SOB/cough/wheezing    Gastrointestinal: denies abdominal pain/diarrhea/constipation/bloody stool    Genitourinary: denies urinary frequency/urgency/ dysuria    Musculoskeletal: denies joint pain or swelling, denies restricted motion    Hematologic: denies abnormal bleeding  	    	  	    		        	    	            Vital Signs Last 24 Hrs  T(C): 36.6 (13 Feb 2020 05:07), Max: 36.7 (12 Feb 2020 19:50)  T(F): 97.9 (13 Feb 2020 05:07), Max: 98 (12 Feb 2020 19:50)  HR: 68 (13 Feb 2020 05:07) (68 - 76)  BP: 140/78 (13 Feb 2020 07:06) (121/62 - 166/75)  BP(mean): --  RR: 18 (13 Feb 2020 05:07) (18 - 18)  SpO2: 98% (13 Feb 2020 05:07) (95% - 98%)  Daily     Daily     02-12 @ 07:01  -  02-13 @ 07:00  --------------------------------------------------------  IN: 1100 mL / OUT: 1200 mL / NET: -100 mL    02-13 @ 07:01  -  02-13 @ 11:06  --------------------------------------------------------  IN: 120 mL / OUT: 0 mL / NET: 120 mL      Physical Exam:    wdwn female  no JVD  cor RRR  lung clear  abd soft   ext no edema  left upper chest wound clean and dry        LABS:                        10.0   7.69  )-----------( 162      ( 13 Feb 2020 06:57 )             28.5     02-13    137  |  101  |  22  ----------------------------<  158<H>  4.4   |  26  |  1.24    Ca    9.1      13 Feb 2020 06:57  Mg     2.0     02-13            RADIOLOGY & ADDITIONAL TESTS:    TELE:    EKG:

## 2021-03-10 NOTE — ED PROVIDER NOTE - CHIEF COMPLAINT
[facility]  Progress Note - Josr Inman 1948, 67 y o  male MRN: [id number]  Unit/Bed#: -01 Encounter: [id number]  Primary Care Provider: Franci Cortes DO   Date and time admitted to hospital: 3/5/2021  5:34 PM    DMII (diabetes mellitus, type 2) [facility])  Assessment & Plan  Lab Results   Component Value Date    HGBA1C 7 3 (H) 03/04/2021       Recent Labs     03/09/21  1603 03/09/21  2029 03/10/21  0715 03/10/21  1040   POCGLU 185* 229* 309* 396*       Blood Sugar Average: Last 72 hrs:  (P) 287 [id number]   · BG has been much better controlled at home but due to stress and acute illness; BG has been poorly controlled at persistently 200-300  · Will continue with insulin pump regimen- per patient manage which includes approx 10 units of basal rate; 25 g per 1 unit for carbs coverage; and sliding scale according to the pump setting  Patient also check BG 2 hours after meals and administer insulin from the pump according to the reading  · Will continue ISS (additional subQ that will be given by our staff) AC (algorithm 3) and HS (algorithm 1- due to h/o of hypoglycemia at nighttime)  Will add 3 units of humalog TID with meals  Will monitor BG closely for the next 24 hours  If not much improved will increase  · Discussed with patient and wife (via phone) in details  * CVA (cerebral vascular accident) [facility])  Assessment & Plan  · Acute right medullary infarct diagnosed at [facility]  · Continue plavix and lipitor   · continue PT/OT/speech per acute rehab recommendations          VTE Prophylaxis:  Heparin    Patient Centered Rounds: I have performed bedside rounds with nursing staff today      Discussions with Specialists or Other Care Team Provider: nursing and primary team   Education and Discussions with Family / Patient: patient and wife via phone     Current Length of Stay: 5 day(s)    Current Patient Status: Inpatient Rehab     Discharge Plan: per The patient is a 79y Female complaining of primary team     Code Status: Level 1 - Full Code    Subjective:   Feeling okay  Concerns about high BG since he got here  Objective:     Vitals:   Temp (24hrs), Av 6 °F (37 °C), Min:98 2 °F (36 8 °C), Max:98 9 °F (37 2 °C)    Temp:  [98 2 °F (36 8 °C)-98 9 °F (37 2 °C)] 98 2 °F (36 8 °C)  HR:  [68-72] 72  Resp:  [18] 18  BP: (144-150)/(73-78) 144/78  SpO2:  [96 %-97 %] 97 %  Body mass index is 22 94 kg/m²  Input and Output Summary (last 24 hours): Intake/Output Summary (Last 24 hours) at 3/10/2021 1204  Last data filed at 3/10/2021 0601  Gross per 24 hour   Intake 360 ml   Output 350 ml   Net 10 ml       Physical Exam:   Physical Exam  Vitals signs and nursing note reviewed  Constitutional:       General: He is not in acute distress  Appearance: Normal appearance  HENT:      Head: Normocephalic and atraumatic  Right Ear: External ear normal       Left Ear: External ear normal       Nose: Nose normal       Mouth/Throat:      Mouth: Mucous membranes are moist       Pharynx: Oropharynx is clear  Eyes:      General:         Right eye: No discharge  Left eye: No discharge  Extraocular Movements: Extraocular movements intact  Pupils: Pupils are equal, round, and reactive to light  Neck:      Musculoskeletal: Normal range of motion and neck supple  No neck rigidity  Cardiovascular:      Rate and Rhythm: Normal rate and regular rhythm  Pulses: Normal pulses  Heart sounds: Normal heart sounds  No murmur  Pulmonary:      Effort: Pulmonary effort is normal  No respiratory distress  Breath sounds: Normal breath sounds  No wheezing or rales  Abdominal:      General: Bowel sounds are normal  There is no distension  Palpations: Abdomen is soft  There is no mass  Musculoskeletal:         General: No swelling, tenderness or deformity  Skin:     General: Skin is warm and dry  Capillary Refill: Capillary refill takes less than 2 seconds  Coloration: Skin is not pale  Findings: No erythema  Neurological:      Mental Status: He is alert and oriented to person, place, and time  Mental status is at baseline  Psychiatric:         Mood and Affect: Mood normal          Behavior: Behavior normal          Thought Content: Thought content normal          Judgment: Judgment normal          Additional Data:     Labs:    Results from last 7 days   Lab Units 03/10/21  0538   WBC Thousand/uL 5 00   HEMOGLOBIN g/dL 12 7*   HEMATOCRIT % 37 2*   PLATELETS Thousands/uL 208   NEUTROS PCT % 54   LYMPHS PCT % 26   MONOS PCT % 11   EOS PCT % 7*     Results from last 7 days   Lab Units 03/10/21  0538   SODIUM mmol/L 131*   POTASSIUM mmol/L 4 4   CHLORIDE mmol/L 96*   CO2 mmol/L 25   BUN mg/dL 44*   CREATININE mg/dL 1 63*   CALCIUM mg/dL 9 6         Results from last 7 days   Lab Units 03/10/21  1040 03/10/21  0715 03/09/21  2029 03/09/21  1603 03/09/21  1122 03/09/21  0719 03/08/21 2003 03/08/21  1616 03/08/21  1202 03/08/21  0757 03/07/21 2000 03/07/21  1551   POC GLUCOSE mg/dl 396* 309* 229* 185* 369* 287* 249* 206* 352* 262* 279* 222*     Results from last 7 days   Lab Units 03/04/21  0552   HEMOGLOBIN A1C % 7 3*       * I Have Reviewed All Lab Data Listed Above  * Additional Pertinent Lab Tests Reviewed:  Michel 66 Admission  Reviewed    Imaging:  Imaging Reports Reviewed Today Include: n/a    Recent Cultures (last 7 days):           Last 24 Hours Medication List:   Current Facility-Administered Medications   Medication Dose Route Frequency Provider Last Rate    acetaminophen  650 mg Oral Q6H PRN Erin Escamilla PA-C      amLODIPine  5 mg Oral Daily Yuly Dennis MD      atorvastatin  40 mg Oral Daily With [employer] ANIVAL Atkinson      clopidogrel  75 mg Oral Daily Erin Escamilla PA-C      docusate sodium  100 mg Oral BID Erin Escamilla PA-C      heparin (porcine)  5,000 Units Subcutaneous [facility] Erin Escamilla PA-C  hydrALAZINE  25 mg Oral Q8H PRN Robin Fuentes PA-C      insulin aspart  1,000 Units Subcutaneous Insulin Pump Daily PRN Robin Fuentes PA-C      insulin lispro  1-5 Units Subcutaneous HS Robin Fuentes PA-C      insulin lispro  1-6 Units Subcutaneous TID AC Robin Fuentes PA-C      levothyroxine  88 mcg Oral Early Morning Robin Fuentes PA-C      lisinopril  40 mg Oral Daily Robin Fuentes PA-C      pantoprazole  40 mg Oral Daily Before Breakfast Carmen Jain MD      patient maintained insulin pump  1 each Subcutaneous 4x Daily (with meals and at bedtime) REEMA Mahmood      polyethylene glycol  17 g Oral Daily PRN Robin Fuentes PA-C          Today, Patient Was Seen By: REEMA Mahmood    ** Please Note: Dictation voice to text software may have been used in the creation of this document   **

## 2021-10-05 NOTE — PROGRESS NOTE ADULT - ASSESSMENT
79 year female with DM, HTN HLD, with symptomatic bradycardia, sinus bradycardia, intermittent second degree AV block. The patient is post PPM, moderate pain at site. Yes

## 2022-12-21 NOTE — CHART NOTE - NSCHARTNOTEFT_GEN_A_CORE
Patient is a 79y old  Female who presents with a chief complaint of sob (07 Feb 2020 18  Called by RN to relate that pt. noted to be in "VFib for few seconds" on tele monitor  Per primary RN, PCA was at pt's bedside to draw routine blood work scheduled, and noted pt awake and Lt arm tremulousness.  Requested RN to perform an EKG, and pt. evaluated at bedside promptly  EKG revealed SR with 2nd deg AVB Type I and no noted acute ST T changes  Tele event reviewed, appears to be artifactual X 36 sec. likely while pt's arm was tremulous  At bedside, pt appeared apprehensive, and cried, was afraid  Alert & Oriented X3, denied having cp, sob, dizziness, abdominal pain, n/v or palpitations  Per dtr. at bedside, pt is known to have angelo. arm tremors, and has no hx strokes  Pt did state that her tongue was "heavy" and mouth felt "dry"  Pt given sips of water, and allowed to rinse mouth and gargled with mouth wash without issue  She reported feeling better after above done         Vital Signs Last 24 Hrs  T(C): 36.2 (08 Feb 2020 03:56), Max: 36.7 (07 Feb 2020 14:17)  T(F): 97.2 (08 Feb 2020 03:56), Max: 98.1 (07 Feb 2020 14:17)  HR: 51 (08 Feb 2020 03:56) (51 - 71)  BP: 122/75 (08 Feb 2020 03:56) (122/60 - 155/90)  BP(mean): 105 (08 Feb 2020 03:25) (86 - 105)  RR: 18 (08 Feb 2020 03:56) (18 - 20)  SpO2: 100% (08 Feb 2020 03:56) (96% - 100%)      Labs:                          9.7    7.77  )-----------( 194      ( 08 Feb 2020 03:33 )             28.9     02-08    137  |  101  |  23  ----------------------------<  280<H>  4.3   |  25  |  1.48<H>    Ca    9.0      08 Feb 2020 03:33  Mg     2.0     02-08    TPro  8.4<H>  /  Alb  3.8  /  TBili  0.7  /  DBili  x   /  AST  21  /  ALT  15  /  AlkPhos  89  02-07    CARDIAC MARKERS ( 08 Feb 2020 03:33 )  x     / x     / 80 U/L / x     / 1.2 ng/mL  CARDIAC MARKERS ( 07 Feb 2020 19:41 )  x     / x     / 98 U/L / x     / 1.5 ng/mL          Radiology:    Physical Exam:  General: Frail elderly female, apprehensive, crying  Neurology: A&Ox3, nonfocal, ZUNIGA x 4  Head:  Normocephalic, atraumatic  Eyes: Rt pupil brisk to light, Lt pupil sluggish (pt relates having glaucoma, and decreased visual acuity of Lt eye)  Mouth: tongue midline; no facial droop   Respiratory: Decr. BS, but CTA B/L  CV:  S1S2, no murmur  Abdominal: Soft, Non tender, non distended, + BS  MSK: + b/l LE  edema, + peripheral pulses, FROM all 4 extremity           + angelo. upper extremity tremors, L > R      Assessment & Plan:  HPI:  79 y.o. female hx of HTN, HLD, Second degree AV block (type 1) presents to the ED from PCP office Dr. Syed Lora for admission for PPM on 2/10/2020. Patient endorses dyspnea on exertional and intermittent chest discomfort, substernal, without radiation for past few days. Denies fever, chills, syncopal episodes. Not on digoxin, ccb, bb. (07 Feb 2020 18:57)    Pt seen this morning for reported "VFib for few secs." as noted on tele, by primary RN  Tele event reviewed, and appears to be artifact X 36 sec., and likely due to pt. upper extremity tremors  Pt remains in SR with 2nd deg AVB Type I rates 50's  Clinically, pt remained hemodynamically stable, conversive, answered questions appropriately, and followed commands without issue  Neurologically, non focal. Pt at first related having a "heavy" tongue, but that sensation was relieved after pt given water, and rinsed with mouth wash  A CT scan of the head was contemplated, but pt. was adamant that she is claustrophobic, and would not tolerate test  Due to pt. current HR and rhythm, no sedation could be given    PLAN:  >Continue to monitor  >Maintain on current medication regimen  >No AVN  blockers or HR slowing meds to be given  >Will follow results of routine blood work this AM  >Endorse to day team; follow up per Attending       -Await further recs. from EP Att.       -For probable PPM implant Rx Refill Note  Requested Prescriptions     Pending Prescriptions Disp Refills   • metFORMIN ER (GLUCOPHAGE-XR) 500 MG 24 hr tablet [Pharmacy Med Name: METFORMIN ER 500MG 24HR TABS] 360 tablet 1     Sig: TAKE 2 TABLETS BY MOUTH TWICE DAILY      Last filled: 06/09/2022  Last office visit with prescribing clinician: 12/20/2022      Next office visit with prescribing clinician: 1/27/2023 April NONA Hinds MA  12/21/22, 12:16 EST

## 2024-01-05 ENCOUNTER — INPATIENT (INPATIENT)
Facility: HOSPITAL | Age: 84
LOS: 7 days | Discharge: HOME HEALTH | DRG: 291 | End: 2024-01-13
Attending: INTERNAL MEDICINE | Admitting: INTERNAL MEDICINE
Payer: MEDICARE

## 2024-01-05 VITALS
HEART RATE: 96 BPM | OXYGEN SATURATION: 95 % | RESPIRATION RATE: 24 BRPM | SYSTOLIC BLOOD PRESSURE: 145 MMHG | HEIGHT: 65 IN | TEMPERATURE: 97 F | WEIGHT: 250 LBS | DIASTOLIC BLOOD PRESSURE: 74 MMHG

## 2024-01-05 DIAGNOSIS — H40.9 UNSPECIFIED GLAUCOMA: Chronic | ICD-10-CM

## 2024-01-05 DIAGNOSIS — R09.89 OTHER SPECIFIED SYMPTOMS AND SIGNS INVOLVING THE CIRCULATORY AND RESPIRATORY SYSTEMS: ICD-10-CM

## 2024-01-05 DIAGNOSIS — Z90.49 ACQUIRED ABSENCE OF OTHER SPECIFIED PARTS OF DIGESTIVE TRACT: Chronic | ICD-10-CM

## 2024-01-05 DIAGNOSIS — H26.9 UNSPECIFIED CATARACT: Chronic | ICD-10-CM

## 2024-01-05 DIAGNOSIS — Z98.890 OTHER SPECIFIED POSTPROCEDURAL STATES: Chronic | ICD-10-CM

## 2024-01-05 DIAGNOSIS — I50.9 HEART FAILURE, UNSPECIFIED: ICD-10-CM

## 2024-01-05 LAB
ALBUMIN SERPL ELPH-MCNC: 3.6 G/DL — SIGNIFICANT CHANGE UP (ref 3.3–5)
ALBUMIN SERPL ELPH-MCNC: 3.6 G/DL — SIGNIFICANT CHANGE UP (ref 3.3–5)
ALP SERPL-CCNC: 221 U/L — HIGH (ref 40–120)
ALP SERPL-CCNC: 221 U/L — HIGH (ref 40–120)
ALT FLD-CCNC: 36 U/L — SIGNIFICANT CHANGE UP (ref 10–45)
ALT FLD-CCNC: 36 U/L — SIGNIFICANT CHANGE UP (ref 10–45)
ANION GAP SERPL CALC-SCNC: 13 MMOL/L — SIGNIFICANT CHANGE UP (ref 5–17)
ANION GAP SERPL CALC-SCNC: 13 MMOL/L — SIGNIFICANT CHANGE UP (ref 5–17)
AST SERPL-CCNC: 44 U/L — HIGH (ref 10–40)
AST SERPL-CCNC: 44 U/L — HIGH (ref 10–40)
BASE EXCESS BLDV CALC-SCNC: 2.2 MMOL/L — SIGNIFICANT CHANGE UP (ref -2–3)
BASE EXCESS BLDV CALC-SCNC: 2.2 MMOL/L — SIGNIFICANT CHANGE UP (ref -2–3)
BASOPHILS # BLD AUTO: 0.03 K/UL — SIGNIFICANT CHANGE UP (ref 0–0.2)
BASOPHILS # BLD AUTO: 0.03 K/UL — SIGNIFICANT CHANGE UP (ref 0–0.2)
BASOPHILS NFR BLD AUTO: 0.5 % — SIGNIFICANT CHANGE UP (ref 0–2)
BASOPHILS NFR BLD AUTO: 0.5 % — SIGNIFICANT CHANGE UP (ref 0–2)
BILIRUB SERPL-MCNC: 0.8 MG/DL — SIGNIFICANT CHANGE UP (ref 0.2–1.2)
BILIRUB SERPL-MCNC: 0.8 MG/DL — SIGNIFICANT CHANGE UP (ref 0.2–1.2)
BUN SERPL-MCNC: 27 MG/DL — HIGH (ref 7–23)
BUN SERPL-MCNC: 27 MG/DL — HIGH (ref 7–23)
CA-I SERPL-SCNC: 1.2 MMOL/L — SIGNIFICANT CHANGE UP (ref 1.15–1.33)
CA-I SERPL-SCNC: 1.2 MMOL/L — SIGNIFICANT CHANGE UP (ref 1.15–1.33)
CALCIUM SERPL-MCNC: 9.4 MG/DL — SIGNIFICANT CHANGE UP (ref 8.4–10.5)
CALCIUM SERPL-MCNC: 9.4 MG/DL — SIGNIFICANT CHANGE UP (ref 8.4–10.5)
CHLORIDE BLDV-SCNC: 104 MMOL/L — SIGNIFICANT CHANGE UP (ref 96–108)
CHLORIDE BLDV-SCNC: 104 MMOL/L — SIGNIFICANT CHANGE UP (ref 96–108)
CHLORIDE SERPL-SCNC: 102 MMOL/L — SIGNIFICANT CHANGE UP (ref 96–108)
CHLORIDE SERPL-SCNC: 102 MMOL/L — SIGNIFICANT CHANGE UP (ref 96–108)
CK MB BLD-MCNC: 1.2 % — SIGNIFICANT CHANGE UP (ref 0–3.5)
CK MB BLD-MCNC: 1.2 % — SIGNIFICANT CHANGE UP (ref 0–3.5)
CK MB CFR SERPL CALC: 3.6 NG/ML — SIGNIFICANT CHANGE UP (ref 0–3.8)
CK MB CFR SERPL CALC: 3.6 NG/ML — SIGNIFICANT CHANGE UP (ref 0–3.8)
CK SERPL-CCNC: 306 U/L — HIGH (ref 25–170)
CK SERPL-CCNC: 306 U/L — HIGH (ref 25–170)
CO2 BLDV-SCNC: 30 MMOL/L — HIGH (ref 22–26)
CO2 BLDV-SCNC: 30 MMOL/L — HIGH (ref 22–26)
CO2 SERPL-SCNC: 22 MMOL/L — SIGNIFICANT CHANGE UP (ref 22–31)
CO2 SERPL-SCNC: 22 MMOL/L — SIGNIFICANT CHANGE UP (ref 22–31)
CREAT SERPL-MCNC: 1.61 MG/DL — HIGH (ref 0.5–1.3)
CREAT SERPL-MCNC: 1.61 MG/DL — HIGH (ref 0.5–1.3)
EGFR: 31 ML/MIN/1.73M2 — LOW
EGFR: 31 ML/MIN/1.73M2 — LOW
EOSINOPHIL # BLD AUTO: 0.17 K/UL — SIGNIFICANT CHANGE UP (ref 0–0.5)
EOSINOPHIL # BLD AUTO: 0.17 K/UL — SIGNIFICANT CHANGE UP (ref 0–0.5)
EOSINOPHIL NFR BLD AUTO: 2.6 % — SIGNIFICANT CHANGE UP (ref 0–6)
EOSINOPHIL NFR BLD AUTO: 2.6 % — SIGNIFICANT CHANGE UP (ref 0–6)
FLUAV AG NPH QL: SIGNIFICANT CHANGE UP
FLUAV AG NPH QL: SIGNIFICANT CHANGE UP
FLUBV AG NPH QL: SIGNIFICANT CHANGE UP
FLUBV AG NPH QL: SIGNIFICANT CHANGE UP
GAS PNL BLDV: 137 MMOL/L — SIGNIFICANT CHANGE UP (ref 136–145)
GAS PNL BLDV: 137 MMOL/L — SIGNIFICANT CHANGE UP (ref 136–145)
GAS PNL BLDV: SIGNIFICANT CHANGE UP
GAS PNL BLDV: SIGNIFICANT CHANGE UP
GLUCOSE BLDC GLUCOMTR-MCNC: 123 MG/DL — HIGH (ref 70–99)
GLUCOSE BLDC GLUCOMTR-MCNC: 123 MG/DL — HIGH (ref 70–99)
GLUCOSE BLDV-MCNC: 170 MG/DL — HIGH (ref 70–99)
GLUCOSE BLDV-MCNC: 170 MG/DL — HIGH (ref 70–99)
GLUCOSE SERPL-MCNC: 167 MG/DL — HIGH (ref 70–99)
GLUCOSE SERPL-MCNC: 167 MG/DL — HIGH (ref 70–99)
HCO3 BLDV-SCNC: 28 MMOL/L — SIGNIFICANT CHANGE UP (ref 22–29)
HCO3 BLDV-SCNC: 28 MMOL/L — SIGNIFICANT CHANGE UP (ref 22–29)
HCT VFR BLD CALC: 27 % — LOW (ref 34.5–45)
HCT VFR BLD CALC: 27 % — LOW (ref 34.5–45)
HCT VFR BLDA CALC: 28 % — LOW (ref 34.5–46.5)
HCT VFR BLDA CALC: 28 % — LOW (ref 34.5–46.5)
HGB BLD CALC-MCNC: 9.3 G/DL — LOW (ref 11.7–16.1)
HGB BLD CALC-MCNC: 9.3 G/DL — LOW (ref 11.7–16.1)
HGB BLD-MCNC: 9.2 G/DL — LOW (ref 11.5–15.5)
HGB BLD-MCNC: 9.2 G/DL — LOW (ref 11.5–15.5)
IMM GRANULOCYTES NFR BLD AUTO: 0.6 % — SIGNIFICANT CHANGE UP (ref 0–0.9)
IMM GRANULOCYTES NFR BLD AUTO: 0.6 % — SIGNIFICANT CHANGE UP (ref 0–0.9)
LACTATE BLDV-MCNC: 1.9 MMOL/L — SIGNIFICANT CHANGE UP (ref 0.5–2)
LACTATE BLDV-MCNC: 1.9 MMOL/L — SIGNIFICANT CHANGE UP (ref 0.5–2)
LYMPHOCYTES # BLD AUTO: 1.47 K/UL — SIGNIFICANT CHANGE UP (ref 1–3.3)
LYMPHOCYTES # BLD AUTO: 1.47 K/UL — SIGNIFICANT CHANGE UP (ref 1–3.3)
LYMPHOCYTES # BLD AUTO: 22.4 % — SIGNIFICANT CHANGE UP (ref 13–44)
LYMPHOCYTES # BLD AUTO: 22.4 % — SIGNIFICANT CHANGE UP (ref 13–44)
MCHC RBC-ENTMCNC: 27.7 PG — SIGNIFICANT CHANGE UP (ref 27–34)
MCHC RBC-ENTMCNC: 27.7 PG — SIGNIFICANT CHANGE UP (ref 27–34)
MCHC RBC-ENTMCNC: 34.1 GM/DL — SIGNIFICANT CHANGE UP (ref 32–36)
MCHC RBC-ENTMCNC: 34.1 GM/DL — SIGNIFICANT CHANGE UP (ref 32–36)
MCV RBC AUTO: 81.3 FL — SIGNIFICANT CHANGE UP (ref 80–100)
MCV RBC AUTO: 81.3 FL — SIGNIFICANT CHANGE UP (ref 80–100)
MONOCYTES # BLD AUTO: 0.82 K/UL — SIGNIFICANT CHANGE UP (ref 0–0.9)
MONOCYTES # BLD AUTO: 0.82 K/UL — SIGNIFICANT CHANGE UP (ref 0–0.9)
MONOCYTES NFR BLD AUTO: 12.5 % — SIGNIFICANT CHANGE UP (ref 2–14)
MONOCYTES NFR BLD AUTO: 12.5 % — SIGNIFICANT CHANGE UP (ref 2–14)
NEUTROPHILS # BLD AUTO: 4.02 K/UL — SIGNIFICANT CHANGE UP (ref 1.8–7.4)
NEUTROPHILS # BLD AUTO: 4.02 K/UL — SIGNIFICANT CHANGE UP (ref 1.8–7.4)
NEUTROPHILS NFR BLD AUTO: 61.4 % — SIGNIFICANT CHANGE UP (ref 43–77)
NEUTROPHILS NFR BLD AUTO: 61.4 % — SIGNIFICANT CHANGE UP (ref 43–77)
NRBC # BLD: 0 /100 WBCS — SIGNIFICANT CHANGE UP (ref 0–0)
NRBC # BLD: 0 /100 WBCS — SIGNIFICANT CHANGE UP (ref 0–0)
NT-PROBNP SERPL-SCNC: 5053 PG/ML — HIGH (ref 0–300)
NT-PROBNP SERPL-SCNC: 5053 PG/ML — HIGH (ref 0–300)
PCO2 BLDV: 50 MMHG — HIGH (ref 39–42)
PCO2 BLDV: 50 MMHG — HIGH (ref 39–42)
PH BLDV: 7.36 — SIGNIFICANT CHANGE UP (ref 7.32–7.43)
PH BLDV: 7.36 — SIGNIFICANT CHANGE UP (ref 7.32–7.43)
PLATELET # BLD AUTO: 231 K/UL — SIGNIFICANT CHANGE UP (ref 150–400)
PLATELET # BLD AUTO: 231 K/UL — SIGNIFICANT CHANGE UP (ref 150–400)
PO2 BLDV: 36 MMHG — SIGNIFICANT CHANGE UP (ref 25–45)
PO2 BLDV: 36 MMHG — SIGNIFICANT CHANGE UP (ref 25–45)
POTASSIUM BLDV-SCNC: 6.2 MMOL/L — CRITICAL HIGH (ref 3.5–5.1)
POTASSIUM BLDV-SCNC: 6.2 MMOL/L — CRITICAL HIGH (ref 3.5–5.1)
POTASSIUM SERPL-MCNC: 4.9 MMOL/L — SIGNIFICANT CHANGE UP (ref 3.5–5.3)
POTASSIUM SERPL-MCNC: 4.9 MMOL/L — SIGNIFICANT CHANGE UP (ref 3.5–5.3)
POTASSIUM SERPL-SCNC: 4.9 MMOL/L — SIGNIFICANT CHANGE UP (ref 3.5–5.3)
POTASSIUM SERPL-SCNC: 4.9 MMOL/L — SIGNIFICANT CHANGE UP (ref 3.5–5.3)
PROT SERPL-MCNC: 7.8 G/DL — SIGNIFICANT CHANGE UP (ref 6–8.3)
PROT SERPL-MCNC: 7.8 G/DL — SIGNIFICANT CHANGE UP (ref 6–8.3)
RBC # BLD: 3.32 M/UL — LOW (ref 3.8–5.2)
RBC # BLD: 3.32 M/UL — LOW (ref 3.8–5.2)
RBC # FLD: 15.5 % — HIGH (ref 10.3–14.5)
RBC # FLD: 15.5 % — HIGH (ref 10.3–14.5)
RSV RNA NPH QL NAA+NON-PROBE: SIGNIFICANT CHANGE UP
RSV RNA NPH QL NAA+NON-PROBE: SIGNIFICANT CHANGE UP
SAO2 % BLDV: 49.1 % — LOW (ref 67–88)
SAO2 % BLDV: 49.1 % — LOW (ref 67–88)
SARS-COV-2 RNA SPEC QL NAA+PROBE: SIGNIFICANT CHANGE UP
SARS-COV-2 RNA SPEC QL NAA+PROBE: SIGNIFICANT CHANGE UP
SODIUM SERPL-SCNC: 137 MMOL/L — SIGNIFICANT CHANGE UP (ref 135–145)
SODIUM SERPL-SCNC: 137 MMOL/L — SIGNIFICANT CHANGE UP (ref 135–145)
TROPONIN T, HIGH SENSITIVITY RESULT: 39 NG/L — SIGNIFICANT CHANGE UP (ref 0–51)
TROPONIN T, HIGH SENSITIVITY RESULT: 39 NG/L — SIGNIFICANT CHANGE UP (ref 0–51)
TROPONIN T, HIGH SENSITIVITY RESULT: 40 NG/L — SIGNIFICANT CHANGE UP (ref 0–51)
TROPONIN T, HIGH SENSITIVITY RESULT: 40 NG/L — SIGNIFICANT CHANGE UP (ref 0–51)
TROPONIN T, HIGH SENSITIVITY RESULT: 43 NG/L — SIGNIFICANT CHANGE UP (ref 0–51)
TROPONIN T, HIGH SENSITIVITY RESULT: 43 NG/L — SIGNIFICANT CHANGE UP (ref 0–51)
WBC # BLD: 6.55 K/UL — SIGNIFICANT CHANGE UP (ref 3.8–10.5)
WBC # BLD: 6.55 K/UL — SIGNIFICANT CHANGE UP (ref 3.8–10.5)
WBC # FLD AUTO: 6.55 K/UL — SIGNIFICANT CHANGE UP (ref 3.8–10.5)
WBC # FLD AUTO: 6.55 K/UL — SIGNIFICANT CHANGE UP (ref 3.8–10.5)

## 2024-01-05 PROCEDURE — 99285 EMERGENCY DEPT VISIT HI MDM: CPT

## 2024-01-05 PROCEDURE — 71275 CT ANGIOGRAPHY CHEST: CPT | Mod: 26,MA

## 2024-01-05 PROCEDURE — 93971 EXTREMITY STUDY: CPT | Mod: 26,LT

## 2024-01-05 RX ORDER — METOPROLOL TARTRATE 50 MG
25 TABLET ORAL DAILY
Refills: 0 | Status: DISCONTINUED | OUTPATIENT
Start: 2024-01-05 | End: 2024-01-13

## 2024-01-05 RX ORDER — IPRATROPIUM/ALBUTEROL SULFATE 18-103MCG
3 AEROSOL WITH ADAPTER (GRAM) INHALATION ONCE
Refills: 0 | Status: COMPLETED | OUTPATIENT
Start: 2024-01-05 | End: 2024-01-05

## 2024-01-05 RX ORDER — DEXTROSE 50 % IN WATER 50 %
25 SYRINGE (ML) INTRAVENOUS ONCE
Refills: 0 | Status: DISCONTINUED | OUTPATIENT
Start: 2024-01-05 | End: 2024-01-13

## 2024-01-05 RX ORDER — FUROSEMIDE 40 MG
1 TABLET ORAL
Refills: 0 | DISCHARGE

## 2024-01-05 RX ORDER — INSULIN GLARGINE 100 [IU]/ML
20 INJECTION, SOLUTION SUBCUTANEOUS EVERY MORNING
Refills: 0 | Status: DISCONTINUED | OUTPATIENT
Start: 2024-01-05 | End: 2024-01-13

## 2024-01-05 RX ORDER — EMPAGLIFLOZIN 10 MG/1
1 TABLET, FILM COATED ORAL
Refills: 0 | DISCHARGE

## 2024-01-05 RX ORDER — INSULIN LISPRO 100/ML
VIAL (ML) SUBCUTANEOUS AT BEDTIME
Refills: 0 | Status: DISCONTINUED | OUTPATIENT
Start: 2024-01-05 | End: 2024-01-13

## 2024-01-05 RX ORDER — INSULIN LISPRO 100/ML
VIAL (ML) SUBCUTANEOUS
Refills: 0 | Status: DISCONTINUED | OUTPATIENT
Start: 2024-01-05 | End: 2024-01-13

## 2024-01-05 RX ORDER — APIXABAN 2.5 MG/1
5 TABLET, FILM COATED ORAL
Refills: 0 | Status: DISCONTINUED | OUTPATIENT
Start: 2024-01-05 | End: 2024-01-06

## 2024-01-05 RX ORDER — INSULIN GLARGINE 100 [IU]/ML
10 INJECTION, SOLUTION SUBCUTANEOUS AT BEDTIME
Refills: 0 | Status: DISCONTINUED | OUTPATIENT
Start: 2024-01-05 | End: 2024-01-13

## 2024-01-05 RX ORDER — SODIUM CHLORIDE 9 MG/ML
1000 INJECTION, SOLUTION INTRAVENOUS
Refills: 0 | Status: DISCONTINUED | OUTPATIENT
Start: 2024-01-05 | End: 2024-01-13

## 2024-01-05 RX ORDER — INSULIN NPH HUM/REG INSULIN HM 70-30/ML
0 VIAL (ML) SUBCUTANEOUS
Qty: 0 | Refills: 0 | DISCHARGE

## 2024-01-05 RX ORDER — FUROSEMIDE 40 MG
40 TABLET ORAL
Refills: 0 | Status: DISCONTINUED | OUTPATIENT
Start: 2024-01-05 | End: 2024-01-07

## 2024-01-05 RX ORDER — ATORVASTATIN CALCIUM 80 MG/1
1 TABLET, FILM COATED ORAL
Refills: 0 | DISCHARGE

## 2024-01-05 RX ORDER — DORZOLAMIDE HYDROCHLORIDE TIMOLOL MALEATE 20; 5 MG/ML; MG/ML
0 SOLUTION/ DROPS OPHTHALMIC
Refills: 0 | DISCHARGE

## 2024-01-05 RX ORDER — DEXTROSE 50 % IN WATER 50 %
15 SYRINGE (ML) INTRAVENOUS ONCE
Refills: 0 | Status: DISCONTINUED | OUTPATIENT
Start: 2024-01-05 | End: 2024-01-13

## 2024-01-05 RX ORDER — LATANOPROST 0.05 MG/ML
1 SOLUTION/ DROPS OPHTHALMIC; TOPICAL
Qty: 0 | Refills: 0 | DISCHARGE

## 2024-01-05 RX ORDER — GLUCAGON INJECTION, SOLUTION 0.5 MG/.1ML
1 INJECTION, SOLUTION SUBCUTANEOUS ONCE
Refills: 0 | Status: DISCONTINUED | OUTPATIENT
Start: 2024-01-05 | End: 2024-01-13

## 2024-01-05 RX ORDER — DEXTROSE 50 % IN WATER 50 %
12.5 SYRINGE (ML) INTRAVENOUS ONCE
Refills: 0 | Status: DISCONTINUED | OUTPATIENT
Start: 2024-01-05 | End: 2024-01-13

## 2024-01-05 RX ORDER — ALBUTEROL 90 UG/1
2 AEROSOL, METERED ORAL EVERY 6 HOURS
Refills: 0 | Status: DISCONTINUED | OUTPATIENT
Start: 2024-01-05 | End: 2024-01-13

## 2024-01-05 RX ORDER — BRIMONIDINE TARTRATE 2 MG/MG
1 SOLUTION/ DROPS OPHTHALMIC DAILY
Refills: 0 | Status: DISCONTINUED | OUTPATIENT
Start: 2024-01-05 | End: 2024-01-13

## 2024-01-05 RX ORDER — FUROSEMIDE 40 MG
40 TABLET ORAL ONCE
Refills: 0 | Status: COMPLETED | OUTPATIENT
Start: 2024-01-05 | End: 2024-01-05

## 2024-01-05 RX ORDER — HYDRALAZINE HCL 50 MG
25 TABLET ORAL
Refills: 0 | Status: DISCONTINUED | OUTPATIENT
Start: 2024-01-05 | End: 2024-01-06

## 2024-01-05 RX ORDER — SITAGLIPTIN 50 MG/1
1 TABLET, FILM COATED ORAL
Qty: 0 | Refills: 0 | DISCHARGE

## 2024-01-05 RX ORDER — ACETAMINOPHEN 500 MG
650 TABLET ORAL EVERY 6 HOURS
Refills: 0 | Status: DISCONTINUED | OUTPATIENT
Start: 2024-01-05 | End: 2024-01-13

## 2024-01-05 RX ORDER — ATORVASTATIN CALCIUM 80 MG/1
40 TABLET, FILM COATED ORAL AT BEDTIME
Refills: 0 | Status: DISCONTINUED | OUTPATIENT
Start: 2024-01-05 | End: 2024-01-13

## 2024-01-05 RX ORDER — BRIMONIDINE TARTRATE 2 MG/MG
1 SOLUTION/ DROPS OPHTHALMIC
Refills: 0 | DISCHARGE

## 2024-01-05 RX ORDER — ICOSAPENT ETHYL 500 MG/1
2 CAPSULE, LIQUID FILLED ORAL
Refills: 0 | DISCHARGE

## 2024-01-05 RX ORDER — SITAGLIPTIN 50 MG/1
1 TABLET, FILM COATED ORAL
Refills: 0 | DISCHARGE

## 2024-01-05 RX ORDER — BRIMONIDINE TARTRATE 2 MG/MG
1 SOLUTION/ DROPS OPHTHALMIC
Qty: 0 | Refills: 0 | DISCHARGE

## 2024-01-05 RX ADMIN — ALBUTEROL 2 PUFF(S): 90 AEROSOL, METERED ORAL at 22:40

## 2024-01-05 RX ADMIN — Medication 40 MILLIGRAM(S): at 15:32

## 2024-01-05 RX ADMIN — Medication 650 MILLIGRAM(S): at 23:10

## 2024-01-05 RX ADMIN — Medication 650 MILLIGRAM(S): at 22:40

## 2024-01-05 RX ADMIN — INSULIN GLARGINE 10 UNIT(S): 100 INJECTION, SOLUTION SUBCUTANEOUS at 22:39

## 2024-01-05 RX ADMIN — ATORVASTATIN CALCIUM 40 MILLIGRAM(S): 80 TABLET, FILM COATED ORAL at 22:40

## 2024-01-05 RX ADMIN — Medication 3 MILLILITER(S): at 13:51

## 2024-01-05 NOTE — ED ADULT NURSE REASSESSMENT NOTE - NS ED NURSE REASSESS COMMENT FT1
changed new: diaper, sheet , Pt placed on primatfit. Pt tolerated well. 2 staff members at the bedside. Procedure explained to pt. Pt had no complaints.

## 2024-01-05 NOTE — H&P ADULT - NSHPREVIEWOFSYSTEMS_GEN_ALL_CORE
REVIEW OF SYSTEMS:    CONSTITUTIONAL: + weakness, no fevers or chills  EYES/ENT: No visual changes;  No vertigo or throat pain   NECK: No pain or stiffness  RESPIRATORY: No cough, wheezing, hemoptysis; + shortness of breath  CARDIOVASCULAR: No chest pain or palpitations  GASTROINTESTINAL: No abdominal or epigastric pain. No nausea, vomiting, or hematemesis; No diarrhea or constipation. No melena or hematochezia.  GENITOURINARY: No dysuria, frequency or hematuria  NEUROLOGICAL: No numbness or weakness  SKIN: No itching, burning, rashes, or lesions  L breat erythema   All other review of systems is negative unless indicated above.

## 2024-01-05 NOTE — ED PROVIDER NOTE - ATTENDING CONTRIBUTION TO CARE
83F hx of bradycardia s/p PPM presenting with 2 weeks of dyspnea worse with exertion, cough with white sputum, intermittent substernal CP that arises from exertion. Also reports L-sided swelling (arm, breast and leg). Had DVT ultrasounds of LUE and bilateral LLE, which were negative. 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 On exam, non-toxic, NAD, protecting airway, mildly tachpyneic but speaking in full sentences, very faint end-expiratory wheeze bilaterally, no crackles, normotensive and normocardic, abdomen benign, bilateral pitting edema, warm on exam, L breast warm, mildly ttp, and swollen. Will check labs, cardiac markers, CTA chest to evaluate for PE given possible new diagnosis of cancer and pro-coagulable state, heart failure w/u, likely TBA.

## 2024-01-05 NOTE — H&P ADULT - NSHPPHYSICALEXAM_GEN_ALL_CORE
PHYSICAL EXAMINATION:  Vital Signs Last 24 Hrs  T(C): 36.5 (05 Jan 2024 12:30), Max: 36.5 (05 Jan 2024 12:30)  T(F): 97.7 (05 Jan 2024 12:30), Max: 97.7 (05 Jan 2024 12:30)  HR: 70 (05 Jan 2024 16:46) (70 - 96)  BP: 156/80 (05 Jan 2024 16:46) (145/74 - 156/80)  BP(mean): --  RR: 20 (05 Jan 2024 16:46) (20 - 24)  SpO2: 100% (05 Jan 2024 16:46) (95% - 100%)    Parameters below as of 05 Jan 2024 16:46  Patient On (Oxygen Delivery Method): room air      CAPILLARY BLOOD GLUCOSE          GENERAL: NAD   HEAD:  atraumatic, normocephalic  EYES: sclera anicteric L eye lateral deviation, blind  ENMT: mucous membranes moist  NECK: supple, No JVD  CHEST/LUNG: few basilar rales. L breast with erythema   HEART: normal S1, S2  ABDOMEN: BS+, soft, ND, NT   EXTREMITIES:  2+ edema b/l LEs 1+ edema L arm  NEURO: awake, alert, interactive; moves all extremities  SKIN: no rashes or lesions

## 2024-01-05 NOTE — ED PROVIDER NOTE - PROGRESS NOTE DETAILS
Annette Swan MD (PGY-2 EM): discussed w patient CTA results, need for admission for CHF. cards will follow patient while in hospital. patient agreeable to plan. Annette wSan MD (PGY-2 EM): discussed w patient CTA results, need for admission for CHF. cards will follow patient while in hospital. patient agreeable to plan.

## 2024-01-05 NOTE — H&P ADULT - NSHPLABSRESULTS_GEN_ALL_CORE
9.2    6.55  )-----------( 231      ( 05 Jan 2024 13:15 )             27.0       01-05    137  |  102  |  27<H>  ----------------------------<  167<H>  4.9   |  22  |  1.61<H>    Ca    9.4      05 Jan 2024 13:14  Phos  3.7     01-05  Mg     2.2     01-05    TPro  7.8  /  Alb  3.6  /  TBili  0.8  /  DBili  x   /  AST  44<H>  /  ALT  36  /  AlkPhos  221<H>  01-05              Urinalysis Basic - ( 05 Jan 2024 13:14 )    Color: x / Appearance: x / SG: x / pH: x  Gluc: 167 mg/dL / Ketone: x  / Bili: x / Urobili: x   Blood: x / Protein: x / Nitrite: x   Leuk Esterase: x / RBC: x / WBC x   Sq Epi: x / Non Sq Epi: x / Bacteria: x      < from: [facility] Duplex Upper Ext Vein Scan, Left (01.05.24 @ 17:07) >    IMPRESSION:  No evidence of left upper extremity deep venous thrombosis.    < end of copied text >    IMPRESSION:  No pulmonary embolism.    Trace pleural effusions.    Mild asymmetric left breast skin thickening with soft tissue infiltration   laterally, incompletely imaged. Left axillary lymph nodes measuring up to   1 cm short axis. Correlate with dedicated breast imaging.    Mild peripheral reticulation and traction bronchiectasis may be related   to mild/early interstitial lung disease.    < end of copied text >    EKG A Fib T inv ant lat leads

## 2024-01-05 NOTE — ED PROCEDURE NOTE - PROCEDURE ADDITIONAL DETAILS
Scant B-lines bilaterally, no pleural effusions, no consolidations  LVEF grossly normal, perhaps mildly diminished at worst, no SWA, no RV dilation, IVC plethoric, no pericardial effusions, possible TR  Certain views limited by body habitus

## 2024-01-05 NOTE — H&P ADULT - NSICDXPASTMEDICALHX_GEN_ALL_CORE_FT
PAST MEDICAL HISTORY:  2nd degree AV block     Chronic atrial fibrillation     Glaucoma of left eye     HLD (hyperlipidemia)     HTN (hypertension)     Morbid obesity     Pacemaker     Type 2 diabetes mellitus

## 2024-01-05 NOTE — ED ADULT NURSE NOTE - OBJECTIVE STATEMENT
84yo F aaox3 h/o HLD, DM , pacemaker , presents to Ed ambulatory with granddaughter at the bedside helping with history , pt started 2 week ago with non productive cough, sob, went to see her pcp for: L body sided swelling, sob, R upper anterior chest pain, L groin pain , Xrays was done: results PNA, pt was advice to came to ED for evaluations,  upon assessment pt talking short sentences, sob, Pt denies CP, HA, vision changes, n/v/d, fevers chills, abdominal pain, Safety and comfort measures initiated- bed placed in lowest position and side rails raised. Pt oriented to call bell system. 82yo F aaox3 h/o HLD, DM , pacemaker , presents to Ed ambulatory with granddaughter at the bedside helping with history , pt started 2 week ago with non productive cough, sob, went to see her pcp for: L body sided swelling, sob, R upper anterior chest pain, L groin pain , Xrays was done: results PNA, pt was advice to came to ED for evaluations,  upon assessment pt talking short sentences, sob, Pt denies CP, HA, vision changes, n/v/d, fevers chills, abdominal pain, Safety and comfort measures initiated- bed placed in lowest position and side rails raised. Pt oriented to call bell system.

## 2024-01-05 NOTE — ED ADULT NURSE NOTE - ED STAT RN HANDOFF DETAILS
Report given to receiving change of shift TAMI THAO patient is in no acute distress. Patient vital signs stable, plan of care explained.

## 2024-01-05 NOTE — H&P ADULT - ASSESSMENT
83 f with     Acute on Chronic Systolic Congestive Heart Failure  - telemetry  - diurese  - cardiac enzymes  - echo  - cardiology evaluation Dr Guido CORONADO Fib  - rate control  - AC     L Breast abnormality  - US L breast    Anemia  - follow Hct  - iron studies, B12    Diabetes Mellitus  - BS control  - ADA diet     CKD  - follow cr, lytes    PPM  - check    HTN control    Glaucoma  - continue gtt     Obesity morbid  - nutrition education    PT    DVT prophylaxis    Further action as per clinical course     d/w patient and granddaughter     Jamal Ramachandran MD phone [phone]  83 f with     Acute on Chronic Systolic Congestive Heart Failure  - telemetry  - diurese  - cardiac enzymes  - echo  - cardiology evaluation Dr Guido CORONADO Fib  - rate control  - AC     L Breast abnormality  - US L breast    Anemia  - follow Hct  - iron studies, B12    Diabetes Mellitus  - BS control  - ADA diet     CKD  - follow cr, lytes    PPM  - check    HTN control    Glaucoma  - continue gtt     Obesity morbid  - nutrition education    PT    DVT prophylaxis    Further action as per clinical course     d/w patient and granddaughter     Jamal Ramachandran MD phone [phone]

## 2024-01-05 NOTE — ED ADULT TRIAGE NOTE - CHIEF COMPLAINT QUOTE
shortness of breathe and cough for 5 days, seen by PCP yesterday, had Chest X ray done and sent here for Pneumonia

## 2024-01-05 NOTE — H&P ADULT - NSHPSOCIALHISTORY_GEN_ALL_CORE
Social History:    Marital Status:  (   )    (   ) Single    (   )    ( x )   Occupation:   Lives with: (  ) alone  ( x ) children   (  ) spouse   (  ) parents  (  ) other    Substance Use (street drugs): ( x ) never used  (  ) other:  Tobacco Usage:  (x   ) never smoked   (   ) former smoker   (   ) current smoker  (     ) pack years  (        ) last cigarette date  Alcohol Usage: no    (     ) Advanced Directives: (     ) None    (      ) DNR    (     ) DNI    (     ) Health Care Proxy:

## 2024-01-05 NOTE — ED ADULT NURSE NOTE - NSFALLHARMRISKINTERV_ED_ALL_ED
Assistance OOB with selected safe patient handling equipment if applicable/Assistance with ambulation/Communicate risk of Fall with Harm to all staff, patient, and family/Monitor gait and stability/Provide patient with walking aids/Provide visual cue: red socks, yellow wristband, yellow gown, etc/Reinforce activity limits and safety measures with patient and family/Bed in lowest position, wheels locked, appropriate side rails in place/Call bell, personal items and telephone in reach/Instruct patient to call for assistance before getting out of bed/chair/stretcher/Non-slip footwear applied when patient is off stretcher/[place] to call system/Physically safe environment - no spills, clutter or unnecessary equipment/Purposeful Proactive Rounding/Room/bathroom lighting operational, light cord in reach Assistance OOB with selected safe patient handling equipment if applicable/Assistance with ambulation/Communicate risk of Fall with Harm to all staff, patient, and family/Monitor gait and stability/Provide patient with walking aids/Provide visual cue: red socks, yellow wristband, yellow gown, etc/Reinforce activity limits and safety measures with patient and family/Bed in lowest position, wheels locked, appropriate side rails in place/Call bell, personal items and telephone in reach/Instruct patient to call for assistance before getting out of bed/chair/stretcher/Non-slip footwear applied when patient is off stretcher/[place] to call system/Physically safe environment - no spills, clutter or unnecessary equipment/Purposeful Proactive Rounding/Room/bathroom lighting operational, light cord in reach

## 2024-01-06 LAB
A1C WITH ESTIMATED AVERAGE GLUCOSE RESULT: 6.9 % — HIGH (ref 4–5.6)
A1C WITH ESTIMATED AVERAGE GLUCOSE RESULT: 6.9 % — HIGH (ref 4–5.6)
ALBUMIN SERPL ELPH-MCNC: 3.5 G/DL — SIGNIFICANT CHANGE UP (ref 3.3–5)
ALBUMIN SERPL ELPH-MCNC: 3.5 G/DL — SIGNIFICANT CHANGE UP (ref 3.3–5)
ALP SERPL-CCNC: 176 U/L — HIGH (ref 40–120)
ALP SERPL-CCNC: 176 U/L — HIGH (ref 40–120)
ALT FLD-CCNC: 29 U/L — SIGNIFICANT CHANGE UP (ref 10–45)
ALT FLD-CCNC: 29 U/L — SIGNIFICANT CHANGE UP (ref 10–45)
ANION GAP SERPL CALC-SCNC: 9 MMOL/L — SIGNIFICANT CHANGE UP (ref 5–17)
ANION GAP SERPL CALC-SCNC: 9 MMOL/L — SIGNIFICANT CHANGE UP (ref 5–17)
APPEARANCE UR: CLEAR — SIGNIFICANT CHANGE UP
APPEARANCE UR: CLEAR — SIGNIFICANT CHANGE UP
AST SERPL-CCNC: 36 U/L — SIGNIFICANT CHANGE UP (ref 10–40)
AST SERPL-CCNC: 36 U/L — SIGNIFICANT CHANGE UP (ref 10–40)
BILIRUB SERPL-MCNC: 0.5 MG/DL — SIGNIFICANT CHANGE UP (ref 0.2–1.2)
BILIRUB SERPL-MCNC: 0.5 MG/DL — SIGNIFICANT CHANGE UP (ref 0.2–1.2)
BILIRUB UR-MCNC: NEGATIVE — SIGNIFICANT CHANGE UP
BILIRUB UR-MCNC: NEGATIVE — SIGNIFICANT CHANGE UP
BUN SERPL-MCNC: 30 MG/DL — HIGH (ref 7–23)
BUN SERPL-MCNC: 30 MG/DL — HIGH (ref 7–23)
CALCIUM SERPL-MCNC: 8.9 MG/DL — SIGNIFICANT CHANGE UP (ref 8.4–10.5)
CALCIUM SERPL-MCNC: 8.9 MG/DL — SIGNIFICANT CHANGE UP (ref 8.4–10.5)
CHLORIDE SERPL-SCNC: 100 MMOL/L — SIGNIFICANT CHANGE UP (ref 96–108)
CHLORIDE SERPL-SCNC: 100 MMOL/L — SIGNIFICANT CHANGE UP (ref 96–108)
CO2 SERPL-SCNC: 28 MMOL/L — SIGNIFICANT CHANGE UP (ref 22–31)
CO2 SERPL-SCNC: 28 MMOL/L — SIGNIFICANT CHANGE UP (ref 22–31)
COLOR SPEC: YELLOW — SIGNIFICANT CHANGE UP
COLOR SPEC: YELLOW — SIGNIFICANT CHANGE UP
CREAT SERPL-MCNC: 2.09 MG/DL — HIGH (ref 0.5–1.3)
CREAT SERPL-MCNC: 2.09 MG/DL — HIGH (ref 0.5–1.3)
DIFF PNL FLD: NEGATIVE — SIGNIFICANT CHANGE UP
DIFF PNL FLD: NEGATIVE — SIGNIFICANT CHANGE UP
EGFR: 23 ML/MIN/1.73M2 — LOW
EGFR: 23 ML/MIN/1.73M2 — LOW
ESTIMATED AVERAGE GLUCOSE: 151 MG/DL — HIGH (ref 68–114)
ESTIMATED AVERAGE GLUCOSE: 151 MG/DL — HIGH (ref 68–114)
FERRITIN SERPL-MCNC: 58 NG/ML — SIGNIFICANT CHANGE UP (ref 13–330)
FERRITIN SERPL-MCNC: 58 NG/ML — SIGNIFICANT CHANGE UP (ref 13–330)
GLUCOSE BLDC GLUCOMTR-MCNC: 136 MG/DL — HIGH (ref 70–99)
GLUCOSE BLDC GLUCOMTR-MCNC: 136 MG/DL — HIGH (ref 70–99)
GLUCOSE BLDC GLUCOMTR-MCNC: 175 MG/DL — HIGH (ref 70–99)
GLUCOSE BLDC GLUCOMTR-MCNC: 175 MG/DL — HIGH (ref 70–99)
GLUCOSE BLDC GLUCOMTR-MCNC: 198 MG/DL — HIGH (ref 70–99)
GLUCOSE BLDC GLUCOMTR-MCNC: 198 MG/DL — HIGH (ref 70–99)
GLUCOSE BLDC GLUCOMTR-MCNC: 247 MG/DL — HIGH (ref 70–99)
GLUCOSE BLDC GLUCOMTR-MCNC: 247 MG/DL — HIGH (ref 70–99)
GLUCOSE SERPL-MCNC: 72 MG/DL — SIGNIFICANT CHANGE UP (ref 70–99)
GLUCOSE SERPL-MCNC: 72 MG/DL — SIGNIFICANT CHANGE UP (ref 70–99)
GLUCOSE UR QL: 250 MG/DL
GLUCOSE UR QL: 250 MG/DL
HCT VFR BLD CALC: 24.8 % — LOW (ref 34.5–45)
HCT VFR BLD CALC: 24.8 % — LOW (ref 34.5–45)
HGB BLD-MCNC: 8.6 G/DL — LOW (ref 11.5–15.5)
HGB BLD-MCNC: 8.6 G/DL — LOW (ref 11.5–15.5)
IRON SATN MFR SERPL: 26 UG/DL — LOW (ref 30–160)
IRON SATN MFR SERPL: 26 UG/DL — LOW (ref 30–160)
IRON SATN MFR SERPL: 9 % — LOW (ref 14–50)
IRON SATN MFR SERPL: 9 % — LOW (ref 14–50)
KETONES UR-MCNC: NEGATIVE MG/DL — SIGNIFICANT CHANGE UP
KETONES UR-MCNC: NEGATIVE MG/DL — SIGNIFICANT CHANGE UP
LEUKOCYTE ESTERASE UR-ACNC: NEGATIVE — SIGNIFICANT CHANGE UP
LEUKOCYTE ESTERASE UR-ACNC: NEGATIVE — SIGNIFICANT CHANGE UP
MAGNESIUM SERPL-MCNC: 2 MG/DL — SIGNIFICANT CHANGE UP (ref 1.6–2.6)
MAGNESIUM SERPL-MCNC: 2 MG/DL — SIGNIFICANT CHANGE UP (ref 1.6–2.6)
MCHC RBC-ENTMCNC: 27.3 PG — SIGNIFICANT CHANGE UP (ref 27–34)
MCHC RBC-ENTMCNC: 27.3 PG — SIGNIFICANT CHANGE UP (ref 27–34)
MCHC RBC-ENTMCNC: 34.7 GM/DL — SIGNIFICANT CHANGE UP (ref 32–36)
MCHC RBC-ENTMCNC: 34.7 GM/DL — SIGNIFICANT CHANGE UP (ref 32–36)
MCV RBC AUTO: 78.7 FL — LOW (ref 80–100)
MCV RBC AUTO: 78.7 FL — LOW (ref 80–100)
NITRITE UR-MCNC: NEGATIVE — SIGNIFICANT CHANGE UP
NITRITE UR-MCNC: NEGATIVE — SIGNIFICANT CHANGE UP
NRBC # BLD: 0 /100 WBCS — SIGNIFICANT CHANGE UP (ref 0–0)
NRBC # BLD: 0 /100 WBCS — SIGNIFICANT CHANGE UP (ref 0–0)
PH UR: 5 — SIGNIFICANT CHANGE UP (ref 5–8)
PH UR: 5 — SIGNIFICANT CHANGE UP (ref 5–8)
PHOSPHATE SERPL-MCNC: 4.5 MG/DL — SIGNIFICANT CHANGE UP (ref 2.5–4.5)
PHOSPHATE SERPL-MCNC: 4.5 MG/DL — SIGNIFICANT CHANGE UP (ref 2.5–4.5)
PLATELET # BLD AUTO: 213 K/UL — SIGNIFICANT CHANGE UP (ref 150–400)
PLATELET # BLD AUTO: 213 K/UL — SIGNIFICANT CHANGE UP (ref 150–400)
POTASSIUM SERPL-MCNC: 4.2 MMOL/L — SIGNIFICANT CHANGE UP (ref 3.5–5.3)
POTASSIUM SERPL-MCNC: 4.2 MMOL/L — SIGNIFICANT CHANGE UP (ref 3.5–5.3)
POTASSIUM SERPL-SCNC: 4.2 MMOL/L — SIGNIFICANT CHANGE UP (ref 3.5–5.3)
POTASSIUM SERPL-SCNC: 4.2 MMOL/L — SIGNIFICANT CHANGE UP (ref 3.5–5.3)
PROT SERPL-MCNC: 7.1 G/DL — SIGNIFICANT CHANGE UP (ref 6–8.3)
PROT SERPL-MCNC: 7.1 G/DL — SIGNIFICANT CHANGE UP (ref 6–8.3)
PROT UR-MCNC: NEGATIVE MG/DL — SIGNIFICANT CHANGE UP
PROT UR-MCNC: NEGATIVE MG/DL — SIGNIFICANT CHANGE UP
RBC # BLD: 3.15 M/UL — LOW (ref 3.8–5.2)
RBC # BLD: 3.15 M/UL — LOW (ref 3.8–5.2)
RBC # FLD: 15 % — HIGH (ref 10.3–14.5)
RBC # FLD: 15 % — HIGH (ref 10.3–14.5)
SODIUM SERPL-SCNC: 137 MMOL/L — SIGNIFICANT CHANGE UP (ref 135–145)
SODIUM SERPL-SCNC: 137 MMOL/L — SIGNIFICANT CHANGE UP (ref 135–145)
SP GR SPEC: 1.01 — SIGNIFICANT CHANGE UP (ref 1–1.03)
SP GR SPEC: 1.01 — SIGNIFICANT CHANGE UP (ref 1–1.03)
TIBC SERPL-MCNC: 271 UG/DL — SIGNIFICANT CHANGE UP (ref 220–430)
TIBC SERPL-MCNC: 271 UG/DL — SIGNIFICANT CHANGE UP (ref 220–430)
TROPONIN T, HIGH SENSITIVITY RESULT: 42 NG/L — SIGNIFICANT CHANGE UP (ref 0–51)
TROPONIN T, HIGH SENSITIVITY RESULT: 42 NG/L — SIGNIFICANT CHANGE UP (ref 0–51)
UIBC SERPL-MCNC: 245 UG/DL — SIGNIFICANT CHANGE UP (ref 110–370)
UIBC SERPL-MCNC: 245 UG/DL — SIGNIFICANT CHANGE UP (ref 110–370)
UROBILINOGEN FLD QL: 0.2 MG/DL — SIGNIFICANT CHANGE UP (ref 0.2–1)
UROBILINOGEN FLD QL: 0.2 MG/DL — SIGNIFICANT CHANGE UP (ref 0.2–1)
VIT B12 SERPL-MCNC: 996 PG/ML — SIGNIFICANT CHANGE UP (ref 232–1245)
VIT B12 SERPL-MCNC: 996 PG/ML — SIGNIFICANT CHANGE UP (ref 232–1245)
WBC # BLD: 5.71 K/UL — SIGNIFICANT CHANGE UP (ref 3.8–10.5)
WBC # BLD: 5.71 K/UL — SIGNIFICANT CHANGE UP (ref 3.8–10.5)
WBC # FLD AUTO: 5.71 K/UL — SIGNIFICANT CHANGE UP (ref 3.8–10.5)
WBC # FLD AUTO: 5.71 K/UL — SIGNIFICANT CHANGE UP (ref 3.8–10.5)

## 2024-01-06 PROCEDURE — 93306 TTE W/DOPPLER COMPLETE: CPT | Mod: 26

## 2024-01-06 RX ORDER — CHLORHEXIDINE GLUCONATE 213 G/1000ML
1 SOLUTION TOPICAL DAILY
Refills: 0 | Status: DISCONTINUED | OUTPATIENT
Start: 2024-01-06 | End: 2024-01-13

## 2024-01-06 RX ORDER — HYDRALAZINE HCL 50 MG
25 TABLET ORAL
Refills: 0 | Status: DISCONTINUED | OUTPATIENT
Start: 2024-01-06 | End: 2024-01-07

## 2024-01-06 RX ADMIN — Medication 650 MILLIGRAM(S): at 14:53

## 2024-01-06 RX ADMIN — Medication 650 MILLIGRAM(S): at 04:21

## 2024-01-06 RX ADMIN — Medication 2: at 17:17

## 2024-01-06 RX ADMIN — CHLORHEXIDINE GLUCONATE 1 APPLICATION(S): 213 SOLUTION TOPICAL at 11:51

## 2024-01-06 RX ADMIN — Medication 25 MILLIGRAM(S): at 05:57

## 2024-01-06 RX ADMIN — Medication 25 MILLIGRAM(S): at 05:56

## 2024-01-06 RX ADMIN — INSULIN GLARGINE 20 UNIT(S): 100 INJECTION, SOLUTION SUBCUTANEOUS at 08:39

## 2024-01-06 RX ADMIN — Medication 1: at 07:57

## 2024-01-06 RX ADMIN — ATORVASTATIN CALCIUM 40 MILLIGRAM(S): 80 TABLET, FILM COATED ORAL at 21:48

## 2024-01-06 RX ADMIN — Medication 40 MILLIGRAM(S): at 05:56

## 2024-01-06 RX ADMIN — Medication 40 MILLIGRAM(S): at 14:54

## 2024-01-06 RX ADMIN — INSULIN GLARGINE 10 UNIT(S): 100 INJECTION, SOLUTION SUBCUTANEOUS at 21:48

## 2024-01-06 RX ADMIN — Medication 650 MILLIGRAM(S): at 15:23

## 2024-01-06 RX ADMIN — BRIMONIDINE TARTRATE 1 DROP(S): 2 SOLUTION/ DROPS OPHTHALMIC at 17:19

## 2024-01-06 RX ADMIN — Medication 650 MILLIGRAM(S): at 04:51

## 2024-01-06 RX ADMIN — ALBUTEROL 2 PUFF(S): 90 AEROSOL, METERED ORAL at 05:56

## 2024-01-06 RX ADMIN — APIXABAN 5 MILLIGRAM(S): 2.5 TABLET, FILM COATED ORAL at 05:56

## 2024-01-06 NOTE — PROVIDER CONTACT NOTE (OTHER) - SITUATION
Problem: Bronchiolitis/Respiratory Syncytial Virus (Pediatric)  Goal: Signs and Symptoms of Listed Potential Problems Will be Absent, Minimized or Managed (Bronchiolitis/Respiratory Syncytial Virus)  Signs and symptoms of listed potential problems will be absent, minimized or managed by discharge/transition of care (reference Bronchiolitis/Respiratory Syncytial Virus (Pediatric) CPG).   Tmax: 100. Pt was restless and uncomfortable last evening, tylenol given x2.  HFNC weaned down to 6L and Fio2 increased to 55%. Abdominal breathing present with occasional subcostal retractions. Coarse lung sounds throughout with a diminished LLL. Suctioned q3-4 hrs with moderate to minimal secretions. No apnea episodes noted. Tolerating goal feeds well. Belly was distended and firm last evening but patient started passing gas and having bowel movements which improved her condition. Mom and dad at bedside, attentive to patient. Will continue to monitor and update with changes/concerns.        Suspected DTI on right lateral foot bunion Suspected DTI on right medial foot bunion

## 2024-01-06 NOTE — PATIENT PROFILE ADULT - FUNCTIONAL ASSESSMENT - BASIC MOBILITY 6.
2-calculated by average/Not able to assess (calculate score using [facility] averaging method)  2-calculated by average/Not able to assess (calculate score using [facility] averaging method)

## 2024-01-06 NOTE — PHYSICAL THERAPY INITIAL EVALUATION ADULT - PERTINENT HX OF CURRENT PROBLEM, REHAB EVAL
Pt is 83F admitted 1/5/23 PMHx of bradycardia s/p PPM presenting with 2 weeks of dyspnea worse with exertion, cough with white sputum, intermittent substernal CP that arises from exertion. Also reports L-sided swelling (arm, breast and leg). Had DVT ultrasounds of LUE and bilateral LLE, which were negative. 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     CT ANGIO CHEST: No pulmonary embolism.Trace pleural effusions.Mild asymmetric left breast skin thickening with soft tissue infiltration laterally, incompletely imaged. Left axillary lymph nodes measuring up to 1 cm short axis. Correlate with dedicated breast imaging.Mild peripheral reticulation and traction bronchiectasis may be related to mild/early interstitial lung disease. VA DUPPLEX: No evidence of left upper extremity deep venous thrombosis.

## 2024-01-06 NOTE — CONSULT NOTE ADULT - ASSESSMENT
83 f with  HTN HLD mild dementia, known cardiomyopathy, baseline creainine unknown , PPM with short bursts PAF, metoprolol and diuretics had been stopped by another provider, possible interstitial lung disease,  with dyspnea, elevated BNP, elevated creatiine, bilateral LCE edema nad left arm /left breast edema    Acute on Chronic Systolic Congestive Heart Failure  -  continue lasix 40 q 12, followup creatinine in am tomorrow  -  telemetry  -  echo  -  continue toprol XL 25 mg daily, increase hydralazine to 25 TID if BP will toleratecardiology evaluation Dr Guido CORONADO Fib  - in NSR on admission  - HB falling, hold eliquis, check Iron studies  - will check PPM ( [facility])     L Breast abnormality  - US L breast, have patient lie on right side if possible    renal failure  - consider renal ultrasound and urinalysis  - consider nephrology consult    Anemia  - follow Hct  - iron studies, B12    Diabetes Mellitus  - BS control  - ADA diet      83 f with  HTN HLD mild dementia, known cardiomyopathy, baseline creainine unknown , PPM with short bursts PAF, metoprolol and diuretics had been stopped by another provider, possible interstitial lung disease,  with dyspnea, elevated BNP, elevated creatiine, bilateral LCE edema nad left arm /left breast edema    Acute on Chronic Systolic Congestive Heart Failure  -  continue lasix 40 q 12, followup creatinine in am tomorrow  -  telemetry  -  echo  -  continue toprol XL 25 mg daily, increase hydralazine to 25 TID if BP will toleratecardiology evaluation Dr Guido CORONADO Fib  - in NSR on admission  - HB falling, hold eliquis, check Iron studies  - will check PPM ( [facility])     L Breast abnormality  - US L breast, have patient lie on right side if possible    renal failure  - consider renal ultrasound and urinalysis  - consider nephrology consult    Anemia  - follow Hct  - iron studies, B12    Diabetes Mellitus  - BS control  - ADA diet

## 2024-01-06 NOTE — PHYSICAL THERAPY INITIAL EVALUATION ADULT - GENERAL OBSERVATIONS, REHAB EVAL
received semisupine in bed, A&OX4, following commands, pleasant & eager to participate, family at bedside, pt admitted with dyspnea, h/o visual impairments.

## 2024-01-06 NOTE — PROGRESS NOTE ADULT - SUBJECTIVE AND OBJECTIVE BOX
Patient is a 83y old  Female who presents with a chief complaint of     SUBJECTIVE / OVERNIGHT EVENTS: feels better. Daughter at bedside.   Review of Systems  chest pain no  palpitations no  sob no  nausea no  headache no    MEDICATIONS  (STANDING):  atorvastatin 40 milliGRAM(s) Oral at bedtime  brimonidine 0.2% Ophthalmic Solution 1 Drop(s) Both EYES daily  chlorhexidine 2% Cloths 1 Application(s) Topical daily  dextrose 5%. 1000 milliLiter(s) (50 mL/Hr) IV Continuous <Continuous>  dextrose 5%. 1000 milliLiter(s) (100 mL/Hr) IV Continuous <Continuous>  dextrose 50% Injectable 25 Gram(s) IV Push once  dextrose 50% Injectable 12.5 Gram(s) IV Push once  dextrose 50% Injectable 25 Gram(s) IV Push once  furosemide   Injectable 40 milliGRAM(s) IV Push two times a day  glucagon  Injectable 1 milliGRAM(s) IntraMuscular once  hydrALAZINE 25 milliGRAM(s) Oral two times a day  insulin glargine Injectable (LANTUS) 20 Unit(s) SubCutaneous every morning  insulin glargine Injectable (LANTUS) 10 Unit(s) SubCutaneous at bedtime  insulin lispro (ADMELOG) corrective regimen sliding scale   SubCutaneous three times a day before meals  insulin lispro (ADMELOG) corrective regimen sliding scale   SubCutaneous at bedtime  metoprolol succinate ER 25 milliGRAM(s) Oral daily    MEDICATIONS  (PRN):  acetaminophen     Tablet .. 650 milliGRAM(s) Oral every 6 hours PRN Temp greater or equal to 38.5C (101.3F), Mild Pain (1 - 3)  albuterol    90 MICROgram(s) HFA Inhaler 2 Puff(s) Inhalation every 6 hours PRN Shortness of Breath and/or Wheezing  dextrose Oral Gel 15 Gram(s) Oral once PRN Blood Glucose LESS THAN 70 milliGRAM(s)/deciliter      Vital Signs Last 24 Hrs  T(C): 36.7 (06 Jan 2024 04:43), Max: 36.7 (06 Jan 2024 04:43)  T(F): 98 (06 Jan 2024 04:43), Max: 98 (06 Jan 2024 04:43)  HR: 70 (06 Jan 2024 09:22) (70 - 79)  BP: 115/73 (06 Jan 2024 09:22) (115/73 - 157/78)  BP(mean): --  RR: 19 (06 Jan 2024 04:43) (19 - 22)  SpO2: 95% (06 Jan 2024 09:22) (95% - 100%)    Parameters below as of 06 Jan 2024 09:22  Patient On (Oxygen Delivery Method): room air        PHYSICAL EXAM:  GENERAL: NAD, well-developed  HEAD:  Atraumatic, Normocephalic  EYES: EOMI, PERRLA, conjunctiva and sclera clear  NECK: Supple, No JVD  CHEST/LUNG: Clear to auscultation bilaterally; No wheeze L breast erythema tender  HEART: Regular rate and rhythm; No murmurs, rubs, or gallops  ABDOMEN: Soft, Nontender, Nondistended; Bowel sounds present  EXTREMITIES:  2+ bipedal edema  PSYCH: AAOx3  NEUROLOGY: non-focal  SKIN: No rashes or lesions    LABS:                        8.6    5.71  )-----------( 213      ( 06 Jan 2024 06:55 )             24.8     01-06    137  |  100  |  30<H>  ----------------------------<  72  4.2   |  28  |  2.09<H>    Ca    8.9      06 Jan 2024 06:58  Phos  4.5     01-06  Mg     2.0     01-06    TPro  7.1  /  Alb  3.5  /  TBili  0.5  /  DBili  x   /  AST  36  /  ALT  29  /  AlkPhos  176<H>  01-06      CARDIAC MARKERS ( 05 Jan 2024 20:58 )  x     / x     / 306 U/L / x     / 3.6 ng/mL      Urinalysis Basic - ( 06 Jan 2024 06:58 )    Color: x / Appearance: x / SG: x / pH: x  Gluc: 72 mg/dL / Ketone: x  / Bili: x / Urobili: x   Blood: x / Protein: x / Nitrite: x   Leuk Esterase: x / RBC: x / WBC x   Sq Epi: x / Non Sq Epi: x / Bacteria: x          RADIOLOGY & ADDITIONAL TESTS:    Imaging Personally Reviewed:    Consultant(s) Notes Reviewed:      Care Discussed with Consultants/Other Providers:

## 2024-01-06 NOTE — PROGRESS NOTE ADULT - ASSESSMENT
83 f with     Acute on Chronic Systolic Congestive Heart Failure  - telemetry  - diurese  - echo  - cardiology follow    A Fib  - rate control  - AC on hold 2 to drop in Hct     L Breast abnormality  - US L breast    Anemia  - follow Hct  - iron studies, B12 pending     Diabetes Mellitus  - BS control  - ADA diet     CKD  - follow cr, lytes    PPM  - check    HTN control    Glaucoma  - continue gtt     Obesity morbid  - nutrition education    PT    DVT prophylaxis  - PAS    d/w patient , consultant and daughter     Jamal Ramachandran MD phone [phone]  83 f with     Acute on Chronic Systolic Congestive Heart Failure  - telemetry  - diurese  - echo  - cardiology follow    A Fib  - rate control  - AC on hold 2 to drop in Hct     L Breast abnormality  - US L breast    Anemia  - follow Hct  - iron studies, B12 pending     Diabetes Mellitus  - BS control  - ADA diet     CKD  - follow cr, lytes    PPM  - check    HTN control    Glaucoma  - continue gtt     Obesity morbid  - nutrition education    PT    DVT prophylaxis  - PAS    d/w patient , consultant and daughter     Jamal Ramachandran MD phone [phone]

## 2024-01-06 NOTE — CONSULT NOTE ADULT - SUBJECTIVE AND OBJECTIVE BOX
CHIEF COMPLAINT:      PAST MEDICAL & SURGICAL HISTORY:  Morbid obesity      HTN (hypertension)      Type 2 diabetes mellitus      HLD (hyperlipidemia)      2nd degree AV block      Glaucoma of left eye      Chronic atrial fibrillation      Pacemaker      H/O hernia repair      History of cholecystectomy      H/O left knee surgery      Cataract of right eye      Glaucoma  left eye          HPI:    83F hx of  HTN HLD mild dementia, SSS s/p PPM 2020 for symptomatic second degree AV Block,  h/o interstitial lung disease, h/o BLAINE, advised to wear sleep mask but finds this uncomfortable,  h/o cardiomyopathy LVEF in 2022 had decreased to 35%, equivocal stress test with possible anterior wall ischemia in  5/23 , managed medically due to advanced age and dementia, came to my office 1/4/24 with worsening dyspnea, cough, left arm swelling left breast swelling and bilateral pedal edema, urgent CXR possible LLL pneumonia, upper extremity venogram negative and lower extremity doppler negative for thrombus PPM interogation had revealed short bursts of AFIb. ASA was stopped, Eliquis and Lasix were started ,and patient contacted on 1/.5/23 am and was still c/o dyspnea, advised to go to the ER,   mammogram no mass but findings concerning for possible  L breast  inflammatory carcinoma.  Of note the patient frequently lies on the left side.    Labs notable creatiine 1.6-> increased to 2, Hb 9.2->8.6, WBC normal troponn borderline 40 PRO bnp [id number]    EKG with intermittent V pacing and t wave inversion anteriorly and diffusely when conducting through AV Node                PREVIOUS DIAGNOSTIC TESTING:      ECHO  FINDINGS:    STRESS  FINDINGS:    CATHETERIZATION  FINDINGS:    ELECTROPHYSIOLOGY STUDY  FINDINGS:    CAROTID ULTRASOUND:  FINDINGS    VENOUS DUPLEX SCAN:  FINDINGS:    CHEST CT PULMONARY ANGIO with IV Contrast:ACC: [id number] EXAM:  CT ANGIO CHEST PULM ART WAWIC   ORDERED BY:    DUSTIN SPANN     PROCEDURE DATE:  01/05/2024          INTERPRETATION:  CLINICAL INDICATION: Possible left breast inflammatory   cancer, with increasing shortness of breath, rule out pulmonary embolism    TECHNIQUE: A volumetric acquisition of the chest was obtained from the   thoracic inlet to the upper abdomen during dynamic administration of 90cc   intravenous contrast according to the PE protocol. 3D MIP images were   provided.    COMPARISON: None    FINDINGS:    CTA: The study is technically adequate with a good contrast bolus to the   pulmonary arteries. No pulmonary embolism. Cardiomegaly. Left chest wall   dual chamber pacemaker with right atrial and right ventricular leads   present. No pericardial effusion. 4.4 cm mid ascending aorta. Enlarged   pulmonary artery to 3.8 cm which can be seen with pulmonary hypertension.   Mild aortic valvular and mitral annular calcification.    Lungs/Airways/Pleura: Trace loculated pleural effusions. The central   airways are patent. There is mild bilateral multifocal peripheral   reticulation with associated areas of mild traction bronchiectasis. No   honeycombing    Mediastinum/Lymph nodes: Rounded left axillary lymph nodes measure up to   1 cm short axis. No mediastinal, hilar or internal mammary   lymphadenopathy. Heterogeneously enlarged, multinodular right thyroid   lobe. Small to moderate-sized hiatal hernia.    Upper Abdomen: Reflux of contrast into the IVC/hepatic veins.   Cholecystectomy.    Bones and Soft Tissues: No aggressive osseous lesions. Degenerative   changes at L2-L3. Partially imaged left breast demonstrates asymmetric   skin thickening and soft tissue infiltration laterally.    IMPRESSION:  No pulmonary embolism.    Trace pleural effusions.    Mild asymmetric left breast skin thickening with soft tissue infiltration   laterally, incompletely imaged. Left axillary lymph nodes measuring up to   1 cm short axis. Correlate with dedicated breast imaging.    Mild peripheral reticulation and traction bronchiectasis may be related   to mild/early interstitial lung disease.    --- End of Report ---          FINDINGS:  MEDICATIONS  (STANDING):  apixaban 5 milliGRAM(s) Oral two times a day  atorvastatin 40 milliGRAM(s) Oral at bedtime  brimonidine 0.2% Ophthalmic Solution 1 Drop(s) Both EYES daily  chlorhexidine 2% Cloths 1 Application(s) Topical daily  dextrose 5%. 1000 milliLiter(s) (50 mL/Hr) IV Continuous <Continuous>  dextrose 5%. 1000 milliLiter(s) (100 mL/Hr) IV Continuous <Continuous>  dextrose 50% Injectable 25 Gram(s) IV Push once  dextrose 50% Injectable 12.5 Gram(s) IV Push once  dextrose 50% Injectable 25 Gram(s) IV Push once  furosemide   Injectable 40 milliGRAM(s) IV Push two times a day  glucagon  Injectable 1 milliGRAM(s) IntraMuscular once  hydrALAZINE 25 milliGRAM(s) Oral two times a day  insulin glargine Injectable (LANTUS) 10 Unit(s) SubCutaneous at bedtime  insulin glargine Injectable (LANTUS) 20 Unit(s) SubCutaneous every morning  insulin lispro (ADMELOG) corrective regimen sliding scale   SubCutaneous three times a day before meals  insulin lispro (ADMELOG) corrective regimen sliding scale   SubCutaneous at bedtime  metoprolol succinate ER 25 milliGRAM(s) Oral daily    MEDICATIONS  (PRN):  acetaminophen     Tablet .. 650 milliGRAM(s) Oral every 6 hours PRN Temp greater or equal to 38.5C (101.3F), Mild Pain (1 - 3)  albuterol    90 MICROgram(s) HFA Inhaler 2 Puff(s) Inhalation every 6 hours PRN Shortness of Breath and/or Wheezing  dextrose Oral Gel 15 Gram(s) Oral once PRN Blood Glucose LESS THAN 70 milliGRAM(s)/deciliter      FAMILY HISTORY:  Type 2 diabetes mellitus  mother        SOCIAL HISTORY:    CIGARETTES:    ALCOHOL:    REVIEW OF SYSTEMS:    CONSTITUTIONAL: No fever, weight loss, chills, shakes, or fatigue  EYES: No eye pain, visual disturbances, or discharge  ENMT:  No difficulty hearing, tinnitus, vertigo; No sinus or throat pain  NECK: No pain or stiffness  BREASTS: Left breast swelling   RESPIRATORY:  + COugh, wheezing, hemoptysis, or shortness of breath  CARDIOVASCULAR: + chest pain, dyspnea, palpitations, dizziness, syncope, paroxysmal nocturnal dyspnea, orthopnea, or arm or leg swelling  GASTROINTESTINAL: No abdominal  or epigastric pain, nausea, vomiting, hematemesis, diarrhea, constipation, melena or bright red blood.  GENITOURINARY: No dysuria, nocturia, hematuria, or urinary incontinence  NEUROLOGICAL: No headaches, memory loss, slurred speech, limb weakness, loss of strength, numbness, or tremors  SKIN: No itching, burning, rashes, or lesions+ EDEMA   LYMPH NODES: No enlarged glands  ENDOCRINE: No heat or cold intolerance, or hair loss  MUSCULOSKELETAL: No joint pain or swelling, muscle, back, or extremity pain  PSYCHIATRIC: No depression, anxiety, or difficulty sleeping  HEME/LYMPH: No easy bruising or bleeding gums  ALLERY AND IMMUNOLOGIC: No hives or rash.      Vital Signs Last 24 Hrs  T(C): 36.7 (06 Jan 2024 04:43), Max: 36.7 (06 Jan 2024 04:43)  T(F): 98 (06 Jan 2024 04:43), Max: 98 (06 Jan 2024 04:43)  HR: 70 (06 Jan 2024 09:22) (70 - 96)  BP: 115/73 (06 Jan 2024 09:22) (115/73 - 157/78)  BP(mean): --  RR: 19 (06 Jan 2024 04:43) (19 - 24)  SpO2: 95% (06 Jan 2024 09:22) (95% - 100%)    Parameters below as of 06 Jan 2024 09:22  Patient On (Oxygen Delivery Method): room air      Daily Height in cm: 165.1 (05 Jan 2024 10:23)    Daily     01-05 @ 07:01  -  01-06 @ 07:00  --------------------------------------------------------  IN: 0 mL / OUT: 800 mL / NET: -800 mL          PHYSICAL EXAM:    GENERAL: In no apparent distress, well nourished, and hydrated.  HEAD:  Atraumatic, Normocephalic  EYES: EOMI, PERRLA, conjunctiva and sclera clear  ENMT: No tonsillar erythema, exudates, or enlargement; Moist mucous membranes, Good dentition, No lesions  NECK: Supple and normal thyroid.  No JVD or carotid bruit.  Carotid pulse is 2+ bilaterally.  HEART: Regular rate and rhythm; No murmurs, rubs, or gallops.  PULMONARY: Clear to auscultation and perfusion.  No rales, wheezing, or rhonchi bilaterally.  ABDOMEN: Soft, Nontender, Nondistended; Bowel sounds present  EXTREMITIES:  2+ Peripheral Pulses, No clubbing, cyanosis,  left arm swelling, bilateral leg swelling improved from prior  LYMPH: No lymphadenopathy noted  breast Left breast swollen  NEUROLOGICAL: Grossly nonfocal          INTERPRETATION OF TELEMETRY:    ECG:    I&O's Detail    05 Jan 2024 07:01  -  06 Jan 2024 07:00  --------------------------------------------------------  IN:  Total IN: 0 mL    OUT:    Voided (mL): 800 mL  Total OUT: 800 mL    Total NET: -800 mL          LABS:                        8.6    5.71  )-----------( 213      ( 06 Jan 2024 06:55 )             24.8     01-06    137  |  100  |  30<H>  ----------------------------<  72  4.2   |  28  |  2.09<H>    Ca    8.9      06 Jan 2024 06:58  Phos  4.5     01-06  Mg     2.0     01-06    TPro  7.1  /  Alb  3.5  /  TBili  0.5  /  DBili  x   /  AST  36  /  ALT  29  /  AlkPhos  176<H>  01-06    CARDIAC MARKERS ( 05 Jan 2024 20:58 )  x     / x     / 306 U/L / x     / 3.6 ng/mL        Urinalysis Basic - ( 06 Jan 2024 06:58 )    Color: x / Appearance: x / SG: x / pH: x  Gluc: 72 mg/dL / Ketone: x  / Bili: x / Urobili: x   Blood: x / Protein: x / Nitrite: x   Leuk Esterase: x / RBC: x / WBC x   Sq Epi: x / Non Sq Epi: x / Bacteria: x      BNP  I&O's Detail    05 Jan 2024 07:01  -  06 Jan 2024 07:00  --------------------------------------------------------  IN:  Total IN: 0 mL    OUT:    Voided (mL): 800 mL  Total OUT: 800 mL    Total NET: -800 mL        Daily Height in cm: 165.1 (05 Jan 2024 10:23)    Daily     RADIOLOGY & ADDITIONAL STUDIES:               CHIEF COMPLAINT:      PAST MEDICAL & SURGICAL HISTORY:  Morbid obesity      HTN (hypertension)      Type 2 diabetes mellitus      HLD (hyperlipidemia)      2nd degree AV block      Glaucoma of left eye      Chronic atrial fibrillation      Pacemaker      H/O hernia repair      History of cholecystectomy      H/O left knee surgery      Cataract of right eye      Glaucoma  left eye          HPI:    83F hx of  HTN HLD mild dementia, SSS s/p PPM 2020 for symptomatic second degree AV Block,  h/o interstitial lung disease, h/o BLAINE, advised to wear sleep mask but finds this uncomfortable,  h/o cardiomyopathy LVEF in 2022 had decreased to 35%, equivocal stress test with possible anterior wall ischemia in  5/23 , managed medically due to advanced age and dementia, came to my office 1/4/24 with worsening dyspnea, cough, left arm swelling left breast swelling and bilateral pedal edema, urgent CXR possible LLL pneumonia, upper extremity venogram negative and lower extremity doppler negative for thrombus PPM interogation had revealed short bursts of AFIb. ASA was stopped, Eliquis and Lasix were started ,and patient contacted on 1/.5/23 am and was still c/o dyspnea, advised to go to the ER,   mammogram no mass but findings concerning for possible  L breast  inflammatory carcinoma.  Of note the patient frequently lies on the left side.    Labs notable creatiine 1.6-> increased to 2, Hb 9.2->8.6, WBC normal troponn borderline 40 PRO bnp [id number]    EKG with intermittent V pacing and t wave inversion anteriorly and diffusely when conducting through AV Node                PREVIOUS DIAGNOSTIC TESTING:      ECHO  FINDINGS:    STRESS  FINDINGS:    CATHETERIZATION  FINDINGS:    ELECTROPHYSIOLOGY STUDY  FINDINGS:    CAROTID ULTRASOUND:  FINDINGS    VENOUS DUPLEX SCAN:  FINDINGS:    CHEST CT PULMONARY ANGIO with IV Contrast:ACC: [id number] EXAM:  CT ANGIO CHEST PULM ART WAWIC   ORDERED BY:    DUSTIN SPANN     PROCEDURE DATE:  01/05/2024          INTERPRETATION:  CLINICAL INDICATION: Possible left breast inflammatory   cancer, with increasing shortness of breath, rule out pulmonary embolism    TECHNIQUE: A volumetric acquisition of the chest was obtained from the   thoracic inlet to the upper abdomen during dynamic administration of 90cc   intravenous contrast according to the PE protocol. 3D MIP images were   provided.    COMPARISON: None    FINDINGS:    CTA: The study is technically adequate with a good contrast bolus to the   pulmonary arteries. No pulmonary embolism. Cardiomegaly. Left chest wall   dual chamber pacemaker with right atrial and right ventricular leads   present. No pericardial effusion. 4.4 cm mid ascending aorta. Enlarged   pulmonary artery to 3.8 cm which can be seen with pulmonary hypertension.   Mild aortic valvular and mitral annular calcification.    Lungs/Airways/Pleura: Trace loculated pleural effusions. The central   airways are patent. There is mild bilateral multifocal peripheral   reticulation with associated areas of mild traction bronchiectasis. No   honeycombing    Mediastinum/Lymph nodes: Rounded left axillary lymph nodes measure up to   1 cm short axis. No mediastinal, hilar or internal mammary   lymphadenopathy. Heterogeneously enlarged, multinodular right thyroid   lobe. Small to moderate-sized hiatal hernia.    Upper Abdomen: Reflux of contrast into the IVC/hepatic veins.   Cholecystectomy.    Bones and Soft Tissues: No aggressive osseous lesions. Degenerative   changes at L2-L3. Partially imaged left breast demonstrates asymmetric   skin thickening and soft tissue infiltration laterally.    IMPRESSION:  No pulmonary embolism.    Trace pleural effusions.    Mild asymmetric left breast skin thickening with soft tissue infiltration   laterally, incompletely imaged. Left axillary lymph nodes measuring up to   1 cm short axis. Correlate with dedicated breast imaging.    Mild peripheral reticulation and traction bronchiectasis may be related   to mild/early interstitial lung disease.    --- End of Report ---          FINDINGS:  MEDICATIONS  (STANDING):  apixaban 5 milliGRAM(s) Oral two times a day  atorvastatin 40 milliGRAM(s) Oral at bedtime  brimonidine 0.2% Ophthalmic Solution 1 Drop(s) Both EYES daily  chlorhexidine 2% Cloths 1 Application(s) Topical daily  dextrose 5%. 1000 milliLiter(s) (50 mL/Hr) IV Continuous <Continuous>  dextrose 5%. 1000 milliLiter(s) (100 mL/Hr) IV Continuous <Continuous>  dextrose 50% Injectable 25 Gram(s) IV Push once  dextrose 50% Injectable 12.5 Gram(s) IV Push once  dextrose 50% Injectable 25 Gram(s) IV Push once  furosemide   Injectable 40 milliGRAM(s) IV Push two times a day  glucagon  Injectable 1 milliGRAM(s) IntraMuscular once  hydrALAZINE 25 milliGRAM(s) Oral two times a day  insulin glargine Injectable (LANTUS) 10 Unit(s) SubCutaneous at bedtime  insulin glargine Injectable (LANTUS) 20 Unit(s) SubCutaneous every morning  insulin lispro (ADMELOG) corrective regimen sliding scale   SubCutaneous three times a day before meals  insulin lispro (ADMELOG) corrective regimen sliding scale   SubCutaneous at bedtime  metoprolol succinate ER 25 milliGRAM(s) Oral daily    MEDICATIONS  (PRN):  acetaminophen     Tablet .. 650 milliGRAM(s) Oral every 6 hours PRN Temp greater or equal to 38.5C (101.3F), Mild Pain (1 - 3)  albuterol    90 MICROgram(s) HFA Inhaler 2 Puff(s) Inhalation every 6 hours PRN Shortness of Breath and/or Wheezing  dextrose Oral Gel 15 Gram(s) Oral once PRN Blood Glucose LESS THAN 70 milliGRAM(s)/deciliter      FAMILY HISTORY:  Type 2 diabetes mellitus  mother        SOCIAL HISTORY:    CIGARETTES:    ALCOHOL:    REVIEW OF SYSTEMS:    CONSTITUTIONAL: No fever, weight loss, chills, shakes, or fatigue  EYES: No eye pain, visual disturbances, or discharge  ENMT:  No difficulty hearing, tinnitus, vertigo; No sinus or throat pain  NECK: No pain or stiffness  BREASTS: Left breast swelling   RESPIRATORY:  + COugh, wheezing, hemoptysis, or shortness of breath  CARDIOVASCULAR: + chest pain, dyspnea, palpitations, dizziness, syncope, paroxysmal nocturnal dyspnea, orthopnea, or arm or leg swelling  GASTROINTESTINAL: No abdominal  or epigastric pain, nausea, vomiting, hematemesis, diarrhea, constipation, melena or bright red blood.  GENITOURINARY: No dysuria, nocturia, hematuria, or urinary incontinence  NEUROLOGICAL: No headaches, memory loss, slurred speech, limb weakness, loss of strength, numbness, or tremors  SKIN: No itching, burning, rashes, or lesions+ EDEMA   LYMPH NODES: No enlarged glands  ENDOCRINE: No heat or cold intolerance, or hair loss  MUSCULOSKELETAL: No joint pain or swelling, muscle, back, or extremity pain  PSYCHIATRIC: No depression, anxiety, or difficulty sleeping  HEME/LYMPH: No easy bruising or bleeding gums  ALLERY AND IMMUNOLOGIC: No hives or rash.      Vital Signs Last 24 Hrs  T(C): 36.7 (06 Jan 2024 04:43), Max: 36.7 (06 Jan 2024 04:43)  T(F): 98 (06 Jan 2024 04:43), Max: 98 (06 Jan 2024 04:43)  HR: 70 (06 Jan 2024 09:22) (70 - 96)  BP: 115/73 (06 Jan 2024 09:22) (115/73 - 157/78)  BP(mean): --  RR: 19 (06 Jan 2024 04:43) (19 - 24)  SpO2: 95% (06 Jan 2024 09:22) (95% - 100%)    Parameters below as of 06 Jan 2024 09:22  Patient On (Oxygen Delivery Method): room air      Daily Height in cm: 165.1 (05 Jan 2024 10:23)    Daily     01-05 @ 07:01  -  01-06 @ 07:00  --------------------------------------------------------  IN: 0 mL / OUT: 800 mL / NET: -800 mL          PHYSICAL EXAM:    GENERAL: In no apparent distress, well nourished, and hydrated.  HEAD:  Atraumatic, Normocephalic  EYES: EOMI, PERRLA, conjunctiva and sclera clear  ENMT: No tonsillar erythema, exudates, or enlargement; Moist mucous membranes, Good dentition, No lesions  NECK: Supple and normal thyroid.  No JVD or carotid bruit.  Carotid pulse is 2+ bilaterally.  HEART: Regular rate and rhythm; No murmurs, rubs, or gallops.  PULMONARY: Clear to auscultation and perfusion.  No rales, wheezing, or rhonchi bilaterally.  ABDOMEN: Soft, Nontender, Nondistended; Bowel sounds present  EXTREMITIES:  2+ Peripheral Pulses, No clubbing, cyanosis,  left arm swelling, bilateral leg swelling improved from prior  LYMPH: No lymphadenopathy noted  breast Left breast swollen  NEUROLOGICAL: Grossly nonfocal          INTERPRETATION OF TELEMETRY:    ECG:    I&O's Detail    05 Jan 2024 07:01  -  06 Jan 2024 07:00  --------------------------------------------------------  IN:  Total IN: 0 mL    OUT:    Voided (mL): 800 mL  Total OUT: 800 mL    Total NET: -800 mL          LABS:                        8.6    5.71  )-----------( 213      ( 06 Jan 2024 06:55 )             24.8     01-06    137  |  100  |  30<H>  ----------------------------<  72  4.2   |  28  |  2.09<H>    Ca    8.9      06 Jan 2024 06:58  Phos  4.5     01-06  Mg     2.0     01-06    TPro  7.1  /  Alb  3.5  /  TBili  0.5  /  DBili  x   /  AST  36  /  ALT  29  /  AlkPhos  176<H>  01-06    CARDIAC MARKERS ( 05 Jan 2024 20:58 )  x     / x     / 306 U/L / x     / 3.6 ng/mL        Urinalysis Basic - ( 06 Jan 2024 06:58 )    Color: x / Appearance: x / SG: x / pH: x  Gluc: 72 mg/dL / Ketone: x  / Bili: x / Urobili: x   Blood: x / Protein: x / Nitrite: x   Leuk Esterase: x / RBC: x / WBC x   Sq Epi: x / Non Sq Epi: x / Bacteria: x      BNP  I&O's Detail    05 Jan 2024 07:01  -  06 Jan 2024 07:00  --------------------------------------------------------  IN:  Total IN: 0 mL    OUT:    Voided (mL): 800 mL  Total OUT: 800 mL    Total NET: -800 mL        Daily Height in cm: 165.1 (05 Jan 2024 10:23)    Daily     RADIOLOGY & ADDITIONAL STUDIES:

## 2024-01-06 NOTE — PATIENT PROFILE ADULT - OVER THE PAST TWO WEEKS HAVE YOU FELT DOWN, DEPRESSED OR HOPELESS?
Concentration Of Solution Injected (Mg/Ml): 2.5 Detail Level: Detailed Total Volume Injected (Ccs- Only Use Numbers And Decimals): 2 Medical Necessity Clause: This procedure was medically necessary because the lesions that were treated were: Include Z78.9 (Other Specified Conditions Influencing Health Status) As An Associated Diagnosis?: No Kenalog Preparation: Kenalog X Size Of Lesion In Cm (Optional): 0 no

## 2024-01-06 NOTE — PATIENT PROFILE ADULT - FALL HARM RISK - HARM RISK INTERVENTIONS
Assistance with ambulation/Assistance OOB with selected safe patient handling equipment/Communicate Risk of Fall with Harm to all staff/Discuss with provider need for PT consult/Monitor gait and stability/Provide patient with walking aids - walker, cane, crutches/Reinforce activity limits and safety measures with patient and family/Tailored Fall Risk Interventions/Visual Cue: Yellow wristband and red socks/Bed in lowest position, wheels locked, appropriate side rails in place/Call bell, personal items and telephone in reach/Instruct patient to call for assistance before getting out of bed or chair/Non-slip footwear when patient is out of bed/[place] to call system/Physically safe environment - no spills, clutter or unnecessary equipment/Purposeful Proactive Rounding/Room/bathroom lighting operational, light cord in reach Assistance with ambulation/Assistance OOB with selected safe patient handling equipment/Communicate Risk of Fall with Harm to all staff/Discuss with provider need for PT consult/Monitor gait and stability/Provide patient with walking aids - walker, cane, crutches/Reinforce activity limits and safety measures with patient and family/Tailored Fall Risk Interventions/Visual Cue: Yellow wristband and red socks/Bed in lowest position, wheels locked, appropriate side rails in place/Call bell, personal items and telephone in reach/Instruct patient to call for assistance before getting out of bed or chair/Non-slip footwear when patient is out of bed/[place] to call system/Physically safe environment - no spills, clutter or unnecessary equipment/Purposeful Proactive Rounding/Room/bathroom lighting operational, light cord in reach
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
## 2024-01-07 LAB
ANION GAP SERPL CALC-SCNC: 11 MMOL/L — SIGNIFICANT CHANGE UP (ref 5–17)
ANION GAP SERPL CALC-SCNC: 11 MMOL/L — SIGNIFICANT CHANGE UP (ref 5–17)
BUN SERPL-MCNC: 30 MG/DL — HIGH (ref 7–23)
BUN SERPL-MCNC: 30 MG/DL — HIGH (ref 7–23)
CALCIUM SERPL-MCNC: 8.6 MG/DL — SIGNIFICANT CHANGE UP (ref 8.4–10.5)
CALCIUM SERPL-MCNC: 8.6 MG/DL — SIGNIFICANT CHANGE UP (ref 8.4–10.5)
CHLORIDE SERPL-SCNC: 99 MMOL/L — SIGNIFICANT CHANGE UP (ref 96–108)
CHLORIDE SERPL-SCNC: 99 MMOL/L — SIGNIFICANT CHANGE UP (ref 96–108)
CO2 SERPL-SCNC: 29 MMOL/L — SIGNIFICANT CHANGE UP (ref 22–31)
CO2 SERPL-SCNC: 29 MMOL/L — SIGNIFICANT CHANGE UP (ref 22–31)
CREAT SERPL-MCNC: 2.25 MG/DL — HIGH (ref 0.5–1.3)
CREAT SERPL-MCNC: 2.25 MG/DL — HIGH (ref 0.5–1.3)
EGFR: 21 ML/MIN/1.73M2 — LOW
EGFR: 21 ML/MIN/1.73M2 — LOW
FERRITIN SERPL-MCNC: 51 NG/ML — SIGNIFICANT CHANGE UP (ref 13–330)
FERRITIN SERPL-MCNC: 51 NG/ML — SIGNIFICANT CHANGE UP (ref 13–330)
FOLATE SERPL-MCNC: 14.5 NG/ML — SIGNIFICANT CHANGE UP
FOLATE SERPL-MCNC: 14.5 NG/ML — SIGNIFICANT CHANGE UP
GLUCOSE BLDC GLUCOMTR-MCNC: 143 MG/DL — HIGH (ref 70–99)
GLUCOSE BLDC GLUCOMTR-MCNC: 143 MG/DL — HIGH (ref 70–99)
GLUCOSE BLDC GLUCOMTR-MCNC: 170 MG/DL — HIGH (ref 70–99)
GLUCOSE BLDC GLUCOMTR-MCNC: 170 MG/DL — HIGH (ref 70–99)
GLUCOSE BLDC GLUCOMTR-MCNC: 183 MG/DL — HIGH (ref 70–99)
GLUCOSE BLDC GLUCOMTR-MCNC: 183 MG/DL — HIGH (ref 70–99)
GLUCOSE BLDC GLUCOMTR-MCNC: 188 MG/DL — HIGH (ref 70–99)
GLUCOSE BLDC GLUCOMTR-MCNC: 188 MG/DL — HIGH (ref 70–99)
GLUCOSE SERPL-MCNC: 119 MG/DL — HIGH (ref 70–99)
GLUCOSE SERPL-MCNC: 119 MG/DL — HIGH (ref 70–99)
HCT VFR BLD CALC: 24.6 % — LOW (ref 34.5–45)
HCT VFR BLD CALC: 24.6 % — LOW (ref 34.5–45)
HGB BLD-MCNC: 8.5 G/DL — LOW (ref 11.5–15.5)
HGB BLD-MCNC: 8.5 G/DL — LOW (ref 11.5–15.5)
IRON SATN MFR SERPL: 27 UG/DL — LOW (ref 30–160)
IRON SATN MFR SERPL: 27 UG/DL — LOW (ref 30–160)
IRON SATN MFR SERPL: 9 % — LOW (ref 14–50)
IRON SATN MFR SERPL: 9 % — LOW (ref 14–50)
MCHC RBC-ENTMCNC: 27.2 PG — SIGNIFICANT CHANGE UP (ref 27–34)
MCHC RBC-ENTMCNC: 27.2 PG — SIGNIFICANT CHANGE UP (ref 27–34)
MCHC RBC-ENTMCNC: 34.6 GM/DL — SIGNIFICANT CHANGE UP (ref 32–36)
MCHC RBC-ENTMCNC: 34.6 GM/DL — SIGNIFICANT CHANGE UP (ref 32–36)
MCV RBC AUTO: 78.8 FL — LOW (ref 80–100)
MCV RBC AUTO: 78.8 FL — LOW (ref 80–100)
MRSA PCR RESULT.: SIGNIFICANT CHANGE UP
MRSA PCR RESULT.: SIGNIFICANT CHANGE UP
NRBC # BLD: 0 /100 WBCS — SIGNIFICANT CHANGE UP (ref 0–0)
NRBC # BLD: 0 /100 WBCS — SIGNIFICANT CHANGE UP (ref 0–0)
PHOSPHATE SERPL-MCNC: 4.9 MG/DL — HIGH (ref 2.5–4.5)
PHOSPHATE SERPL-MCNC: 4.9 MG/DL — HIGH (ref 2.5–4.5)
PLATELET # BLD AUTO: 218 K/UL — SIGNIFICANT CHANGE UP (ref 150–400)
PLATELET # BLD AUTO: 218 K/UL — SIGNIFICANT CHANGE UP (ref 150–400)
POTASSIUM SERPL-MCNC: 4.5 MMOL/L — SIGNIFICANT CHANGE UP (ref 3.5–5.3)
POTASSIUM SERPL-MCNC: 4.5 MMOL/L — SIGNIFICANT CHANGE UP (ref 3.5–5.3)
POTASSIUM SERPL-SCNC: 4.5 MMOL/L — SIGNIFICANT CHANGE UP (ref 3.5–5.3)
POTASSIUM SERPL-SCNC: 4.5 MMOL/L — SIGNIFICANT CHANGE UP (ref 3.5–5.3)
RBC # BLD: 3.12 M/UL — LOW (ref 3.8–5.2)
RBC # BLD: 3.12 M/UL — LOW (ref 3.8–5.2)
RBC # FLD: 15.1 % — HIGH (ref 10.3–14.5)
RBC # FLD: 15.1 % — HIGH (ref 10.3–14.5)
S AUREUS DNA NOSE QL NAA+PROBE: DETECTED
S AUREUS DNA NOSE QL NAA+PROBE: DETECTED
SODIUM SERPL-SCNC: 139 MMOL/L — SIGNIFICANT CHANGE UP (ref 135–145)
SODIUM SERPL-SCNC: 139 MMOL/L — SIGNIFICANT CHANGE UP (ref 135–145)
TIBC SERPL-MCNC: 283 UG/DL — SIGNIFICANT CHANGE UP (ref 220–430)
TIBC SERPL-MCNC: 283 UG/DL — SIGNIFICANT CHANGE UP (ref 220–430)
TSH SERPL-MCNC: 2.39 UIU/ML — SIGNIFICANT CHANGE UP (ref 0.27–4.2)
TSH SERPL-MCNC: 2.39 UIU/ML — SIGNIFICANT CHANGE UP (ref 0.27–4.2)
UIBC SERPL-MCNC: 256 UG/DL — SIGNIFICANT CHANGE UP (ref 110–370)
UIBC SERPL-MCNC: 256 UG/DL — SIGNIFICANT CHANGE UP (ref 110–370)
VIT B12 SERPL-MCNC: 943 PG/ML — SIGNIFICANT CHANGE UP (ref 232–1245)
VIT B12 SERPL-MCNC: 943 PG/ML — SIGNIFICANT CHANGE UP (ref 232–1245)
WBC # BLD: 5.56 K/UL — SIGNIFICANT CHANGE UP (ref 3.8–10.5)
WBC # BLD: 5.56 K/UL — SIGNIFICANT CHANGE UP (ref 3.8–10.5)
WBC # FLD AUTO: 5.56 K/UL — SIGNIFICANT CHANGE UP (ref 3.8–10.5)
WBC # FLD AUTO: 5.56 K/UL — SIGNIFICANT CHANGE UP (ref 3.8–10.5)

## 2024-01-07 RX ORDER — FUROSEMIDE 40 MG
20 TABLET ORAL DAILY
Refills: 0 | Status: DISCONTINUED | OUTPATIENT
Start: 2024-01-07 | End: 2024-01-07

## 2024-01-07 RX ADMIN — Medication 1: at 07:39

## 2024-01-07 RX ADMIN — Medication 25 MILLIGRAM(S): at 05:16

## 2024-01-07 RX ADMIN — INSULIN GLARGINE 20 UNIT(S): 100 INJECTION, SOLUTION SUBCUTANEOUS at 07:39

## 2024-01-07 RX ADMIN — BRIMONIDINE TARTRATE 1 DROP(S): 2 SOLUTION/ DROPS OPHTHALMIC at 14:43

## 2024-01-07 RX ADMIN — Medication 100 MILLIGRAM(S): at 05:16

## 2024-01-07 RX ADMIN — Medication 40 MILLIGRAM(S): at 14:42

## 2024-01-07 RX ADMIN — Medication 40 MILLIGRAM(S): at 05:17

## 2024-01-07 RX ADMIN — CHLORHEXIDINE GLUCONATE 1 APPLICATION(S): 213 SOLUTION TOPICAL at 14:23

## 2024-01-07 RX ADMIN — INSULIN GLARGINE 10 UNIT(S): 100 INJECTION, SOLUTION SUBCUTANEOUS at 21:39

## 2024-01-07 RX ADMIN — Medication 100 MILLIGRAM(S): at 16:49

## 2024-01-07 RX ADMIN — Medication 1: at 17:24

## 2024-01-07 RX ADMIN — ATORVASTATIN CALCIUM 40 MILLIGRAM(S): 80 TABLET, FILM COATED ORAL at 21:38

## 2024-01-07 RX ADMIN — Medication 650 MILLIGRAM(S): at 13:00

## 2024-01-07 RX ADMIN — Medication 650 MILLIGRAM(S): at 12:34

## 2024-01-07 NOTE — PROGRESS NOTE ADULT - ASSESSMENT
83 f with  HTN HLD mild dementia, known cardiomyopathy, baseline creainine unknown , PPM with short bursts PAF, metoprolol and diuretics had been stopped by another provider, possible interstitial lung disease,  with dyspnea, elevated BNP, elevated creatiine, bilateral LCE edema nad left arm /left breast edema. ECHO LV fx preserved.  creatinine increased    Acute on Chronic Systolic Congestive Heart Failure  - hold lasix   - hold hydralazine  -recheck creatinine in AM

## 2024-01-07 NOTE — PROGRESS NOTE ADULT - SUBJECTIVE AND OBJECTIVE BOX
Patient is a 83y old  Female who presents with a chief complaint of     SUBJECTIVE / OVERNIGHT EVENTS: No new complaints.   Review of Systems  chest pain no  palpitations no  sob improving   nausea no  headache no    MEDICATIONS  (STANDING):  atorvastatin 40 milliGRAM(s) Oral at bedtime  brimonidine 0.2% Ophthalmic Solution 1 Drop(s) Both EYES daily  chlorhexidine 2% Cloths 1 Application(s) Topical daily  dextrose 5%. 1000 milliLiter(s) (100 mL/Hr) IV Continuous <Continuous>  dextrose 5%. 1000 milliLiter(s) (50 mL/Hr) IV Continuous <Continuous>  dextrose 50% Injectable 25 Gram(s) IV Push once  dextrose 50% Injectable 25 Gram(s) IV Push once  dextrose 50% Injectable 12.5 Gram(s) IV Push once  furosemide   Injectable 40 milliGRAM(s) IV Push two times a day  glucagon  Injectable 1 milliGRAM(s) IntraMuscular once  hydrALAZINE 25 milliGRAM(s) Oral two times a day  insulin glargine Injectable (LANTUS) 20 Unit(s) SubCutaneous every morning  insulin glargine Injectable (LANTUS) 10 Unit(s) SubCutaneous at bedtime  insulin lispro (ADMELOG) corrective regimen sliding scale   SubCutaneous three times a day before meals  insulin lispro (ADMELOG) corrective regimen sliding scale   SubCutaneous at bedtime  metoprolol succinate ER 25 milliGRAM(s) Oral daily    MEDICATIONS  (PRN):  acetaminophen     Tablet .. 650 milliGRAM(s) Oral every 6 hours PRN Temp greater or equal to 38.5C (101.3F), Mild Pain (1 - 3)  albuterol    90 MICROgram(s) HFA Inhaler 2 Puff(s) Inhalation every 6 hours PRN Shortness of Breath and/or Wheezing  benzonatate 100 milliGRAM(s) Oral every 8 hours PRN Cough  dextrose Oral Gel 15 Gram(s) Oral once PRN Blood Glucose LESS THAN 70 milliGRAM(s)/deciliter  guaiFENesin Oral Liquid (Sugar-Free) 100 milliGRAM(s) Oral every 6 hours PRN Cough      Vital Signs Last 24 Hrs  T(C): 36.7 (07 Jan 2024 11:01), Max: 36.9 (06 Jan 2024 20:39)  T(F): 98.1 (07 Jan 2024 11:01), Max: 98.4 (06 Jan 2024 20:39)  HR: 69 (07 Jan 2024 16:33) (69 - 73)  BP: 94/63 (07 Jan 2024 16:33) (92/58 - 133/74)  BP(mean): --  RR: 18 (07 Jan 2024 11:01) (18 - 18)  SpO2: 99% (07 Jan 2024 11:01) (98% - 99%)    Parameters below as of 07 Jan 2024 11:01  Patient On (Oxygen Delivery Method): room air        PHYSICAL EXAM:  GENERAL: NAD, well-developed  HEAD:  Atraumatic, Normocephalic  EYES: EOMI, PERRLA, conjunctiva and sclera clear  NECK: Supple, No JVD  CHEST/LUNG: few crackles to auscultation bilaterally; No wheeze L breast with erythema tender   HEART: Regular rate and rhythm; No murmurs, rubs, or gallops  ABDOMEN: Soft, Nontender, Nondistended; Bowel sounds present  EXTREMITIES:  2+ Peripheral Pulses, No clubbing, cyanosis, or edema  PSYCH: AAOx3  NEUROLOGY: non-focal  SKIN: No rashes or lesions    LABS:                        8.5    5.56  )-----------( 218      ( 07 Jan 2024 07:31 )             24.6     01-07    139  |  99  |  30<H>  ----------------------------<  119<H>  4.5   |  29  |  2.25<H>    Ca    8.6      07 Jan 2024 07:31  Phos  4.9     01-07  Mg     2.0     01-06    TPro  7.1  /  Alb  3.5  /  TBili  0.5  /  DBili  x   /  AST  36  /  ALT  29  /  AlkPhos  176<H>  01-06      CARDIAC MARKERS ( 05 Jan 2024 20:58 )  x     / x     / 306 U/L / x     / 3.6 ng/mL      Urinalysis Basic - ( 07 Jan 2024 07:31 )    Color: x / Appearance: x / SG: x / pH: x  Gluc: 119 mg/dL / Ketone: x  / Bili: x / Urobili: x   Blood: x / Protein: x / Nitrite: x   Leuk Esterase: x / RBC: x / WBC x   Sq Epi: x / Non Sq Epi: x / Bacteria: x        Culture - Blood (collected 05 Jan 2024 12:40)  Source: .Blood Blood-Peripheral  Preliminary Report (06 Jan 2024 18:02):    No growth at 24 hours    Culture - Blood (collected 05 Jan 2024 12:30)  Source: .Blood Blood-Peripheral  Preliminary Report (06 Jan 2024 18:02):    No growth at 24 hours        RADIOLOGY & ADDITIONAL TESTS:    Imaging Personally Reviewed:    Consultant(s) Notes Reviewed:      Care Discussed with Consultants/Other Providers:

## 2024-01-07 NOTE — PROGRESS NOTE ADULT - ASSESSMENT
83 f with     Acute on Chronic Systolic Congestive Heart Failure  - telemetry  - diurese  - echo  - cardiology follow    A Fib  - rate control  - AC on hold 2 to drop in Hct     L Breast abnormality  - US L breast  - Surgical evaluation     Anemia iron deficiency  - follow Hct  - iron studies noted  - Gastroenterology evaluation in am    Diabetes Mellitus  - BS control  - ADA diet     CKD  - follow cr, lytes  - Nephrology evaluation Dr. Suarez called    PPM  - check    HTN control    Glaucoma  - continue gtt     Obesity morbid  - nutrition education    PT    DVT prophylaxis  - PAS    d/w patient  and daughter     Jamal Ramachandran MD phone [phone]  83 f with     Acute on Chronic Systolic Congestive Heart Failure  - telemetry  - diurese  - echo  - cardiology follow    A Fib  - rate control  - AC on hold 2 to drop in Hct     L Breast abnormality  - US L breast  - Surgical evaluation     Anemia iron deficiency  - follow Hct  - iron studies noted  - Gastroenterology evaluation in am    Diabetes Mellitus  - BS control  - ADA diet     CKD  - follow cr, lytes  - Nephrology evaluation Dr. Suarez called    PPM  - check    HTN control    Glaucoma  - continue gtt     Obesity morbid  - nutrition education    PT    DVT prophylaxis  - PAS    d/w patient  and daughter     Jamal Ramachandran MD phone [phone]

## 2024-01-07 NOTE — PROGRESS NOTE ADULT - SUBJECTIVE AND OBJECTIVE BOX
INTERVAL HPI/OVERNIGHT EVENTS:   less short of breath, and less edema, but borderline Bp and creatinine increasing  still c/o breast edema    MEDICATIONS  (STANDING):  atorvastatin 40 milliGRAM(s) Oral at bedtime  brimonidine 0.2% Ophthalmic Solution 1 Drop(s) Both EYES daily  chlorhexidine 2% Cloths 1 Application(s) Topical daily  dextrose 5%. 1000 milliLiter(s) (100 mL/Hr) IV Continuous <Continuous>  dextrose 5%. 1000 milliLiter(s) (50 mL/Hr) IV Continuous <Continuous>  dextrose 50% Injectable 25 Gram(s) IV Push once  dextrose 50% Injectable 12.5 Gram(s) IV Push once  dextrose 50% Injectable 25 Gram(s) IV Push once  furosemide   Injectable 20 milliGRAM(s) IV Push daily  glucagon  Injectable 1 milliGRAM(s) IntraMuscular once  insulin glargine Injectable (LANTUS) 20 Unit(s) SubCutaneous every morning  insulin glargine Injectable (LANTUS) 10 Unit(s) SubCutaneous at bedtime  insulin lispro (ADMELOG) corrective regimen sliding scale   SubCutaneous three times a day before meals  insulin lispro (ADMELOG) corrective regimen sliding scale   SubCutaneous at bedtime  metoprolol succinate ER 25 milliGRAM(s) Oral daily    MEDICATIONS  (PRN):  acetaminophen     Tablet .. 650 milliGRAM(s) Oral every 6 hours PRN Temp greater or equal to 38.5C (101.3F), Mild Pain (1 - 3)  albuterol    90 MICROgram(s) HFA Inhaler 2 Puff(s) Inhalation every 6 hours PRN Shortness of Breath and/or Wheezing  benzonatate 100 milliGRAM(s) Oral every 8 hours PRN Cough  dextrose Oral Gel 15 Gram(s) Oral once PRN Blood Glucose LESS THAN 70 milliGRAM(s)/deciliter  guaiFENesin Oral Liquid (Sugar-Free) 100 milliGRAM(s) Oral every 6 hours PRN Cough      Allergies    lisinopril (Hives)  penicillin (Unknown)    Intolerances      ROS:  General: Pt denies recent weight loss/fever/chills    Neurological: denies numbness or  sensation loss    Cardiovascular: denies chest pain/palpitations/leg edema    Respiratory and Thorax: denies SOB/cough/wheezing    Gastrointestinal: denies abdominal pain/diarrhea/constipation/bloody stool    Genitourinary: denies urinary frequency/urgency/ dysuria    Musculoskeletal: denies joint pain or swelling, denies restricted motion    Hematologic: denies abnormal bleeding  	    	  	    		        	    	            Vital Signs Last 24 Hrs  T(C): 36.7 (2024 11:01), Max: 36.9 (2024 20:39)  T(F): 98.1 (2024 11:01), Max: 98.4 (2024 20:39)  HR: 69 (2024 16:33) (69 - 73)  BP: 94/63 (2024 16:33) (92/58 - 133/74)  BP(mean): --  RR: 18 (2024 11:) (18 - 18)  SpO2: 99% (2024 11:) (98% - 99%)    Parameters below as of 2024 11:01  Patient On (Oxygen Delivery Method): room air      Daily     Daily Weight in k (2024 07:59)     @ 07: @ 07:00  --------------------------------------------------------  IN: 480 mL / OUT: 3450 mL / NET: -2970 mL     @ 07:  -  -07 @ 17:37  --------------------------------------------------------  IN: 480 mL / OUT: 2400 mL / NET: -1920 mL      Physical Exam:    wdwn male  no JVD  cor RRR  lung clear  abd soft  ext no edema      LABS:                        8.5    5.56  )-----------( 218      ( 2024 07:31 )             24.6         139  |  99  |  30<H>  ----------------------------<  119<H>  4.5   |  29  |  2.25<H>    Ca    8.6      2024 07:31  Phos  4.9     -  Mg     2.0     -    TPro  7.1  /  Alb  3.5  /  TBili  0.5  /  DBili  x   /  AST  36  /  ALT  29  /  AlkPhos  176<H>        Urinalysis Basic - ( 2024 07:31 )    Color: x / Appearance: x / SG: x / pH: x  Gluc: 119 mg/dL / Ketone: x  / Bili: x / Urobili: x   Blood: x / Protein: x / Nitrite: x   Leuk Esterase: x / RBC: x / WBC x   Sq Epi: x / Non Sq Epi: x / Bacteria: x        RADIOLOGY & ADDITIONAL TESTS:    TELE:    EKG:

## 2024-01-08 LAB
ALBUMIN SERPL ELPH-MCNC: 3.1 G/DL — LOW (ref 3.3–5)
ALBUMIN SERPL ELPH-MCNC: 3.1 G/DL — LOW (ref 3.3–5)
ALP SERPL-CCNC: 164 U/L — HIGH (ref 40–120)
ALP SERPL-CCNC: 164 U/L — HIGH (ref 40–120)
ALT FLD-CCNC: 23 U/L — SIGNIFICANT CHANGE UP (ref 10–45)
ALT FLD-CCNC: 23 U/L — SIGNIFICANT CHANGE UP (ref 10–45)
ANION GAP SERPL CALC-SCNC: 11 MMOL/L — SIGNIFICANT CHANGE UP (ref 5–17)
ANION GAP SERPL CALC-SCNC: 11 MMOL/L — SIGNIFICANT CHANGE UP (ref 5–17)
AST SERPL-CCNC: 29 U/L — SIGNIFICANT CHANGE UP (ref 10–40)
AST SERPL-CCNC: 29 U/L — SIGNIFICANT CHANGE UP (ref 10–40)
BILIRUB SERPL-MCNC: 0.4 MG/DL — SIGNIFICANT CHANGE UP (ref 0.2–1.2)
BILIRUB SERPL-MCNC: 0.4 MG/DL — SIGNIFICANT CHANGE UP (ref 0.2–1.2)
BUN SERPL-MCNC: 31 MG/DL — HIGH (ref 7–23)
BUN SERPL-MCNC: 31 MG/DL — HIGH (ref 7–23)
CALCIUM SERPL-MCNC: 9 MG/DL — SIGNIFICANT CHANGE UP (ref 8.4–10.5)
CALCIUM SERPL-MCNC: 9 MG/DL — SIGNIFICANT CHANGE UP (ref 8.4–10.5)
CHLORIDE SERPL-SCNC: 99 MMOL/L — SIGNIFICANT CHANGE UP (ref 96–108)
CHLORIDE SERPL-SCNC: 99 MMOL/L — SIGNIFICANT CHANGE UP (ref 96–108)
CO2 SERPL-SCNC: 30 MMOL/L — SIGNIFICANT CHANGE UP (ref 22–31)
CO2 SERPL-SCNC: 30 MMOL/L — SIGNIFICANT CHANGE UP (ref 22–31)
CREAT SERPL-MCNC: 2.12 MG/DL — HIGH (ref 0.5–1.3)
CREAT SERPL-MCNC: 2.12 MG/DL — HIGH (ref 0.5–1.3)
EGFR: 23 ML/MIN/1.73M2 — LOW
EGFR: 23 ML/MIN/1.73M2 — LOW
GLUCOSE BLDC GLUCOMTR-MCNC: 133 MG/DL — HIGH (ref 70–99)
GLUCOSE BLDC GLUCOMTR-MCNC: 133 MG/DL — HIGH (ref 70–99)
GLUCOSE BLDC GLUCOMTR-MCNC: 179 MG/DL — HIGH (ref 70–99)
GLUCOSE BLDC GLUCOMTR-MCNC: 179 MG/DL — HIGH (ref 70–99)
GLUCOSE BLDC GLUCOMTR-MCNC: 192 MG/DL — HIGH (ref 70–99)
GLUCOSE BLDC GLUCOMTR-MCNC: 192 MG/DL — HIGH (ref 70–99)
GLUCOSE BLDC GLUCOMTR-MCNC: 204 MG/DL — HIGH (ref 70–99)
GLUCOSE BLDC GLUCOMTR-MCNC: 204 MG/DL — HIGH (ref 70–99)
GLUCOSE SERPL-MCNC: 100 MG/DL — HIGH (ref 70–99)
GLUCOSE SERPL-MCNC: 100 MG/DL — HIGH (ref 70–99)
OB PNL STL: NEGATIVE — SIGNIFICANT CHANGE UP
OB PNL STL: NEGATIVE — SIGNIFICANT CHANGE UP
POTASSIUM SERPL-MCNC: 4.4 MMOL/L — SIGNIFICANT CHANGE UP (ref 3.5–5.3)
POTASSIUM SERPL-MCNC: 4.4 MMOL/L — SIGNIFICANT CHANGE UP (ref 3.5–5.3)
POTASSIUM SERPL-SCNC: 4.4 MMOL/L — SIGNIFICANT CHANGE UP (ref 3.5–5.3)
POTASSIUM SERPL-SCNC: 4.4 MMOL/L — SIGNIFICANT CHANGE UP (ref 3.5–5.3)
PROT SERPL-MCNC: 7.2 G/DL — SIGNIFICANT CHANGE UP (ref 6–8.3)
PROT SERPL-MCNC: 7.2 G/DL — SIGNIFICANT CHANGE UP (ref 6–8.3)
SODIUM SERPL-SCNC: 140 MMOL/L — SIGNIFICANT CHANGE UP (ref 135–145)
SODIUM SERPL-SCNC: 140 MMOL/L — SIGNIFICANT CHANGE UP (ref 135–145)

## 2024-01-08 RX ORDER — FUROSEMIDE 40 MG
40 TABLET ORAL DAILY
Refills: 0 | Status: DISCONTINUED | OUTPATIENT
Start: 2024-01-08 | End: 2024-01-10

## 2024-01-08 RX ORDER — FERROUS SULFATE 325(65) MG
325 TABLET ORAL DAILY
Refills: 0 | Status: DISCONTINUED | OUTPATIENT
Start: 2024-01-08 | End: 2024-01-13

## 2024-01-08 RX ORDER — LANOLIN ALCOHOL/MO/W.PET/CERES
3 CREAM (GRAM) TOPICAL ONCE
Refills: 0 | Status: COMPLETED | OUTPATIENT
Start: 2024-01-08 | End: 2024-01-08

## 2024-01-08 RX ADMIN — INSULIN GLARGINE 20 UNIT(S): 100 INJECTION, SOLUTION SUBCUTANEOUS at 08:07

## 2024-01-08 RX ADMIN — BRIMONIDINE TARTRATE 1 DROP(S): 2 SOLUTION/ DROPS OPHTHALMIC at 11:11

## 2024-01-08 RX ADMIN — INSULIN GLARGINE 10 UNIT(S): 100 INJECTION, SOLUTION SUBCUTANEOUS at 21:44

## 2024-01-08 RX ADMIN — Medication 25 MILLIGRAM(S): at 05:08

## 2024-01-08 RX ADMIN — Medication 650 MILLIGRAM(S): at 13:00

## 2024-01-08 RX ADMIN — Medication 650 MILLIGRAM(S): at 12:50

## 2024-01-08 RX ADMIN — Medication 100 MILLIGRAM(S): at 12:50

## 2024-01-08 RX ADMIN — CHLORHEXIDINE GLUCONATE 1 APPLICATION(S): 213 SOLUTION TOPICAL at 11:12

## 2024-01-08 RX ADMIN — ALBUTEROL 2 PUFF(S): 90 AEROSOL, METERED ORAL at 05:09

## 2024-01-08 RX ADMIN — Medication 3 MILLIGRAM(S): at 22:29

## 2024-01-08 RX ADMIN — Medication 1: at 17:43

## 2024-01-08 RX ADMIN — ATORVASTATIN CALCIUM 40 MILLIGRAM(S): 80 TABLET, FILM COATED ORAL at 21:44

## 2024-01-08 RX ADMIN — Medication 650 MILLIGRAM(S): at 07:16

## 2024-01-08 RX ADMIN — Medication 650 MILLIGRAM(S): at 05:08

## 2024-01-08 RX ADMIN — Medication 40 MILLIGRAM(S): at 12:50

## 2024-01-08 NOTE — CONSULT NOTE ADULT - ASSESSMENT
Patient visited at bedside INAD with family member present    LOWER EXTREMITY PHYSICAL EXAM:    Vascular: DP/PT 0/4, B/L, CFT <3 seconds B/L, Temperature gradient wnl, B/L. 1+ edema both feet  Neuro: Epicritic sensation unable to assess to the level of foot, B/L.  Musculoskeletal/Ortho: Bunion 1 MPJ right foot  Skin: healed blister 1 met head right foot  no signs of infection or ulceration both feet  No debridement of healed blister right 1 met head  Recommend patient wear extra depth wide and high toebox shoes  patient to follow up with outside podiatrist

## 2024-01-08 NOTE — DIETITIAN INITIAL EVALUATION ADULT - SIGNS/SYMPTOMS
pt with LIANNA on CKD3b, acute on chronic HF, possible pressure injury, & c/f inflammatory L breast CA

## 2024-01-08 NOTE — DIETITIAN INITIAL EVALUATION ADULT - PERTINENT MEDS FT
MEDICATIONS  (STANDING):  atorvastatin 40 milliGRAM(s) Oral at bedtime  brimonidine 0.2% Ophthalmic Solution 1 Drop(s) Both EYES daily  chlorhexidine 2% Cloths 1 Application(s) Topical daily  dextrose 5%. 1000 milliLiter(s) (100 mL/Hr) IV Continuous <Continuous>  dextrose 5%. 1000 milliLiter(s) (50 mL/Hr) IV Continuous <Continuous>  dextrose 50% Injectable 25 Gram(s) IV Push once  dextrose 50% Injectable 25 Gram(s) IV Push once  dextrose 50% Injectable 12.5 Gram(s) IV Push once  furosemide   Injectable 40 milliGRAM(s) IV Push daily  glucagon  Injectable 1 milliGRAM(s) IntraMuscular once  insulin glargine Injectable (LANTUS) 10 Unit(s) SubCutaneous at bedtime  insulin glargine Injectable (LANTUS) 20 Unit(s) SubCutaneous every morning  insulin lispro (ADMELOG) corrective regimen sliding scale   SubCutaneous three times a day before meals  insulin lispro (ADMELOG) corrective regimen sliding scale   SubCutaneous at bedtime  metoprolol succinate ER 25 milliGRAM(s) Oral daily    MEDICATIONS  (PRN):  acetaminophen     Tablet .. 650 milliGRAM(s) Oral every 6 hours PRN Temp greater or equal to 38.5C (101.3F), Mild Pain (1 - 3)  albuterol    90 MICROgram(s) HFA Inhaler 2 Puff(s) Inhalation every 6 hours PRN Shortness of Breath and/or Wheezing  benzonatate 100 milliGRAM(s) Oral every 8 hours PRN Cough  dextrose Oral Gel 15 Gram(s) Oral once PRN Blood Glucose LESS THAN 70 milliGRAM(s)/deciliter  guaiFENesin Oral Liquid (Sugar-Free) 100 milliGRAM(s) Oral every 6 hours PRN Cough

## 2024-01-08 NOTE — CONSULT NOTE ADULT - SUBJECTIVE AND OBJECTIVE BOX
HPI:  Ms. Bautista is an 83 year-old woman with history of multiple medical issues including morbid obesity, hypertension, type 2 diabetes mellitus, AV block s/p PPM, 83F hx of bradycardia s/p PPM presenting with 2 weeks of dyspnea worse with exertion, cough with white sputum, intermittent substernal CP that arises from exertion. Also reports L-sided swelling (arm, breast and leg). Had DVT ultrasounds of LUE and bilateral LLE, which were negative. 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 No nausea, vomiting, or hematemesis; No diarrhea or constipation. No melena or hematochezia.  GENITOURINARY: No dysuria, frequency or hematuria  NEUROLOGICAL: No numbness or weakness  SKIN: No itching, burning, rashes, or lesions   All other review of systems is negative unless indicated above.    VITAL:  T(C): , Max: 37.3 (01-07-24 @ 20:52)  T(F): , Max: 99.2 (01-07-24 @ 20:52)  HR: 70 (01-08-24 @ 04:54)  BP: 163/70 (01-08-24 @ 04:54)  RR: 18 (01-08-24 @ 04:54)  SpO2: 97% (01-08-24 @ 04:54)  Wt(kg): --    PHYSICAL EXAM:  Constitutional: NAD, Alert  HEENT: NCAT, MMM  Neck: Supple, No JVD  Respiratory: CTA-b/l  Cardiovascular: RRR s1s2, no m/r/g  Gastrointestinal: BS+, soft, NT/ND  Extremities: No peripheral edema b/l  Neurological: no focal deficits; strength grossly intact  Back: no CVAT b/l  Skin: No rashes, no nevi    LABS:                        8.5    5.56  )-----------( 218      ( 07 Jan 2024 07:31 )             24.6     Na(140)/K(4.4)/Cl(99)/HCO3(30)/BUN(31)/Cr(2.12)Glu(100)/Ca(9.0)/Mg(--)/PO4(--)    01-08 @ 07:24  Na(139)/K(4.5)/Cl(99)/HCO3(29)/BUN(30)/Cr(2.25)Glu(119)/Ca(8.6)/Mg(--)/PO4(4.9)    01-07 @ 07:31  Na(137)/K(4.2)/Cl(100)/HCO3(28)/BUN(30)/Cr(2.09)Glu(72)/Ca(8.9)/Mg(2.0)/PO4(4.5)    01-06 @ 06:58  Na(137)/K(4.9)/Cl(102)/HCO3(22)/BUN(27)/Cr(1.61)Glu(167)/Ca(9.4)/Mg(2.2)/PO4(3.7)    01-05 @ 13:14      IMAGING:  < from: CT Angio Chest PE Protocol w/ IV Cont (01.05.24 @ 15:01) >  No pulmonary embolism.  Trace pleural effusions.  Mild asymmetric left breast skin thickening with soft tissue infiltration   laterally, incompletely imaged. Left axillary lymph nodes measuring up to   1 cm short axis. Correlate with dedicated breast imaging.  Mild peripheral reticulation and traction bronchiectasis may be related   to mild/early interstitial lung disease.    < from: TTE W or WO Ultrasound Enhancing Agent (01.06.24 @ 10:06) >   1. Left ventricular cavity is normal. Left ventricular wall thickness is normal. Left ventricular systolic function is normal. There are no regional wallmotion abnormalities seen.   2. Mildly enlarged right ventricular cavity size and probably normal systolic function.   3. Severe tricuspid regurgitation.   4. Device lead is visualized in the right heart.          ASSESSMENT:      RECOMMEND:      Thank you for involving [facility] in this patient's care.    With warm regards,    Yoel Suarez MD   [facility]  Office: [phone]  Cell: [phone]             HPI:  Ms. Bautista is an 83 year-old woman with history of multiple medical issues including morbid obesity, hypertension, type 2 diabetes mellitus, AV block s/p PPM, 83F hx of bradycardia s/p PPM presenting with 2 weeks of dyspnea worse with exertion, cough with white sputum, intermittent substernal CP that arises from exertion. Also reports L-sided swelling (arm, breast and leg). Had DVT ultrasounds of LUE and bilateral LLE, which were negative. 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 No nausea, vomiting, or hematemesis; No diarrhea or constipation. No melena or hematochezia.  GENITOURINARY: No dysuria, frequency or hematuria  NEUROLOGICAL: No numbness or weakness  SKIN: No itching, burning, rashes, or lesions   All other review of systems is negative unless indicated above.    VITAL:  T(C): , Max: 37.3 (01-07-24 @ 20:52)  T(F): , Max: 99.2 (01-07-24 @ 20:52)  HR: 70 (01-08-24 @ 04:54)  BP: 163/70 (01-08-24 @ 04:54)  RR: 18 (01-08-24 @ 04:54)  SpO2: 97% (01-08-24 @ 04:54)  Wt(kg): --    PHYSICAL EXAM:  Constitutional: NAD, Alert  HEENT: NCAT, MMM  Neck: Supple, No JVD  Respiratory: CTA-b/l  Cardiovascular: RRR s1s2, no m/r/g  Gastrointestinal: BS+, soft, NT/ND  Extremities: No peripheral edema b/l  Neurological: no focal deficits; strength grossly intact  Back: no CVAT b/l  Skin: No rashes, no nevi    LABS:                        8.5    5.56  )-----------( 218      ( 07 Jan 2024 07:31 )             24.6     Na(140)/K(4.4)/Cl(99)/HCO3(30)/BUN(31)/Cr(2.12)Glu(100)/Ca(9.0)/Mg(--)/PO4(--)    01-08 @ 07:24  Na(139)/K(4.5)/Cl(99)/HCO3(29)/BUN(30)/Cr(2.25)Glu(119)/Ca(8.6)/Mg(--)/PO4(4.9)    01-07 @ 07:31  Na(137)/K(4.2)/Cl(100)/HCO3(28)/BUN(30)/Cr(2.09)Glu(72)/Ca(8.9)/Mg(2.0)/PO4(4.5)    01-06 @ 06:58  Na(137)/K(4.9)/Cl(102)/HCO3(22)/BUN(27)/Cr(1.61)Glu(167)/Ca(9.4)/Mg(2.2)/PO4(3.7)    01-05 @ 13:14      IMAGING:  < from: CT Angio Chest PE Protocol w/ IV Cont (01.05.24 @ 15:01) >  No pulmonary embolism.  Trace pleural effusions.  Mild asymmetric left breast skin thickening with soft tissue infiltration   laterally, incompletely imaged. Left axillary lymph nodes measuring up to   1 cm short axis. Correlate with dedicated breast imaging.  Mild peripheral reticulation and traction bronchiectasis may be related   to mild/early interstitial lung disease.    < from: TTE W or WO Ultrasound Enhancing Agent (01.06.24 @ 10:06) >   1. Left ventricular cavity is normal. Left ventricular wall thickness is normal. Left ventricular systolic function is normal. There are no regional wallmotion abnormalities seen.   2. Mildly enlarged right ventricular cavity size and probably normal systolic function.   3. Severe tricuspid regurgitation.   4. Device lead is visualized in the right heart.          ASSESSMENT:      RECOMMEND:      Thank you for involving [facility] in this patient's care.    With warm regards,    Yoel Suarez MD   [facility]  Office: [phone]  Cell: [phone]             HPI: Ms. Bautista is an 83 year-old woman with history of multiple medical issues including morbid obesity, hypertension, type 2 diabetes mellitus, and AV block s/p PPM. 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 Ms. Bautista's creatinine on admission was 1.61mg/dL; it has since risen to above 2mg/dL. In light of the azotemia, a renal consultation was requested.    Home meds including Lasix, Kerendia, Jardiance, and Hydralazine. She received Lasix 40mg IV x 1 in the ER.      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 No nausea, vomiting, or hematemesis; No diarrhea or constipation. No melena or hematochezia.  GENITOURINARY: No dysuria, frequency or hematuria  NEUROLOGICAL: No numbness or weakness  SKIN: No itching, burning, rashes, or lesions   All other review of systems is negative unless indicated above.    VITAL:  T(C): , Max: 37.3 (01-07-24 @ 20:52)  T(F): , Max: 99.2 (01-07-24 @ 20:52)  HR: 70 (01-08-24 @ 04:54)  BP: 163/70 (01-08-24 @ 04:54)  RR: 18 (01-08-24 @ 04:54)  SpO2: 97% (01-08-24 @ 04:54)  Wt(kg): --    PHYSICAL EXAM:  Constitutional: NAD, Alert  HEENT: NCAT, MMM  Neck: Supple, No JVD  Respiratory: CTA-b/l  Cardiovascular: RRR s1s2, no m/r/g  Gastrointestinal: BS+, soft, NT/ND  Extremities: No peripheral edema b/l  Neurological: no focal deficits; strength grossly intact  Back: no CVAT b/l  Skin: No rashes, no nevi    LABS:                        8.5    5.56  )-----------( 218      ( 07 Jan 2024 07:31 )             24.6     Na(140)/K(4.4)/Cl(99)/HCO3(30)/BUN(31)/Cr(2.12)Glu(100)/Ca(9.0)/Mg(--)/PO4(--)    01-08 @ 07:24  Na(139)/K(4.5)/Cl(99)/HCO3(29)/BUN(30)/Cr(2.25)Glu(119)/Ca(8.6)/Mg(--)/PO4(4.9)    01-07 @ 07:31  Na(137)/K(4.2)/Cl(100)/HCO3(28)/BUN(30)/Cr(2.09)Glu(72)/Ca(8.9)/Mg(2.0)/PO4(4.5)    01-06 @ 06:58  Na(137)/K(4.9)/Cl(102)/HCO3(22)/BUN(27)/Cr(1.61)Glu(167)/Ca(9.4)/Mg(2.2)/PO4(3.7)    01-05 @ 13:14    (1/5/24) - UA - 250glu, no protein, no blood    (2/12/20) - BUN/creatinine: 22/1.40      IMAGING:  < from: CT Angio Chest PE Protocol w/ IV Cont (01.05.24 @ 15:01) >  No pulmonary embolism.  Trace pleural effusions.  Mild asymmetric left breast skin thickening with soft tissue infiltration   laterally, incompletely imaged. Left axillary lymph nodes measuring up to   1 cm short axis. Correlate with dedicated breast imaging.  Mild peripheral reticulation and traction bronchiectasis may be related   to mild/early interstitial lung disease.    < from: TTE W or WO Ultrasound Enhancing Agent (01.06.24 @ 10:06) >   1. Left ventricular cavity is normal. Left ventricular wall thickness is normal. Left ventricular systolic function is normal. There are no regional wallmotion abnormalities seen.   2. Mildly enlarged right ventricular cavity size and probably normal systolic function.   3. Severe tricuspid regurgitation.   4. Device lead is visualized in the right heart.          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 I do not believe she has BRIAN s/p CTPA. The rise in creatinine since admission is rather mild; it would not be unreasonable here to diurese, especially given her shortness of breath/swelling.    (3)Lytes - acceptable    (4)CV - L-sided swelling - likely primarily due to distributive changes associated with inflammatory breast CA; likely also a component of R-sided CHF in association with interstitial lung disease/cor pulmonale      RECOMMEND:  (1)Can initiate Lasix 40mg IV daily   (2)Dose new meds for GFR 20-30  (3)Workup for inflammatory breast cancer per primary team    Thank you for involving [facility] in this patient's care.    With warm regards,    Yoel Suarez MD   [facility]  Office: [phone]  Cell: [phone]             HPI: Ms. Bautista is an 83 year-old woman with history of multiple medical issues including morbid obesity, hypertension, type 2 diabetes mellitus, and AV block s/p PPM. 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 Ms. Bautista's creatinine on admission was 1.61mg/dL; it has since risen to above 2mg/dL. In light of the azotemia, a renal consultation was requested.    Home meds including Lasix, Kerendia, Jardiance, and Hydralazine. She received Lasix 40mg IV x 1 in the ER.      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 No nausea, vomiting, or hematemesis; No diarrhea or constipation. No melena or hematochezia.  GENITOURINARY: No dysuria, frequency or hematuria  NEUROLOGICAL: No numbness or weakness  SKIN: No itching, burning, rashes, or lesions   All other review of systems is negative unless indicated above.    VITAL:  T(C): , Max: 37.3 (01-07-24 @ 20:52)  T(F): , Max: 99.2 (01-07-24 @ 20:52)  HR: 70 (01-08-24 @ 04:54)  BP: 163/70 (01-08-24 @ 04:54)  RR: 18 (01-08-24 @ 04:54)  SpO2: 97% (01-08-24 @ 04:54)  Wt(kg): --    PHYSICAL EXAM:  Constitutional: NAD, Alert  HEENT: NCAT, MMM  Neck: Supple, No JVD  Respiratory: CTA-b/l  Cardiovascular: RRR s1s2, no m/r/g  Gastrointestinal: BS+, soft, NT/ND  Extremities: No peripheral edema b/l  Neurological: no focal deficits; strength grossly intact  Back: no CVAT b/l  Skin: No rashes, no nevi    LABS:                        8.5    5.56  )-----------( 218      ( 07 Jan 2024 07:31 )             24.6     Na(140)/K(4.4)/Cl(99)/HCO3(30)/BUN(31)/Cr(2.12)Glu(100)/Ca(9.0)/Mg(--)/PO4(--)    01-08 @ 07:24  Na(139)/K(4.5)/Cl(99)/HCO3(29)/BUN(30)/Cr(2.25)Glu(119)/Ca(8.6)/Mg(--)/PO4(4.9)    01-07 @ 07:31  Na(137)/K(4.2)/Cl(100)/HCO3(28)/BUN(30)/Cr(2.09)Glu(72)/Ca(8.9)/Mg(2.0)/PO4(4.5)    01-06 @ 06:58  Na(137)/K(4.9)/Cl(102)/HCO3(22)/BUN(27)/Cr(1.61)Glu(167)/Ca(9.4)/Mg(2.2)/PO4(3.7)    01-05 @ 13:14    (1/5/24) - UA - 250glu, no protein, no blood    (2/12/20) - BUN/creatinine: 22/1.40      IMAGING:  < from: CT Angio Chest PE Protocol w/ IV Cont (01.05.24 @ 15:01) >  No pulmonary embolism.  Trace pleural effusions.  Mild asymmetric left breast skin thickening with soft tissue infiltration   laterally, incompletely imaged. Left axillary lymph nodes measuring up to   1 cm short axis. Correlate with dedicated breast imaging.  Mild peripheral reticulation and traction bronchiectasis may be related   to mild/early interstitial lung disease.    < from: TTE W or WO Ultrasound Enhancing Agent (01.06.24 @ 10:06) >   1. Left ventricular cavity is normal. Left ventricular wall thickness is normal. Left ventricular systolic function is normal. There are no regional wallmotion abnormalities seen.   2. Mildly enlarged right ventricular cavity size and probably normal systolic function.   3. Severe tricuspid regurgitation.   4. Device lead is visualized in the right heart.          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 I do not believe she has BRIAN s/p CTPA. The rise in creatinine since admission is rather mild; it would not be unreasonable here to diurese, especially given her shortness of breath/swelling.    (3)Lytes - acceptable    (4)CV - L-sided swelling - likely primarily due to distributive changes associated with inflammatory breast CA; likely also a component of R-sided CHF in association with interstitial lung disease/cor pulmonale      RECOMMEND:  (1)Can initiate Lasix 40mg IV daily   (2)Dose new meds for GFR 20-30  (3)Workup for inflammatory breast cancer per primary team    Thank you for involving [facility] in this patient's care.    With warm regards,    Yoel Suarez MD   [facility]  Office: [phone]  Cell: [phone]             HPI: Ms. Bautista is an 83 year-old woman with history of multiple medical issues including morbid obesity, hypertension, type 2 diabetes mellitus, and AV block s/p PPM. 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 Ms. Bautista's creatinine on admission was 1.61mg/dL; it has since risen to above 2mg/dL. In light of the azotemia, a renal consultation was requested.    Home meds including Lasix, Kerendia, Jardiance, and Hydralazine. She received Lasix 40mg IV x 1 in the ER.      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 No nausea, vomiting, or hematemesis; No diarrhea or constipation. No melena or hematochezia.  GENITOURINARY: No dysuria, frequency or hematuria  NEUROLOGICAL: No numbness or weakness  SKIN: No itching, burning, rashes, or lesions   All other review of systems is negative unless indicated above.    VITAL:  T(C): , Max: 37.3 (01-07-24 @ 20:52)  T(F): , Max: 99.2 (01-07-24 @ 20:52)  HR: 70 (01-08-24 @ 04:54)  BP: 163/70 (01-08-24 @ 04:54)  RR: 18 (01-08-24 @ 04:54)  SpO2: 97% (01-08-24 @ 04:54)  Wt(kg): --    PHYSICAL EXAM:  Constitutional: Obese, lethargic, mildly confused  HEENT: NCAT, DMM  Neck: Supple, No JVD  Respiratory: CTA-b/l  Cardiovascular: RRR s1s2, no m/r/g  Gastrointestinal: BS+, soft, NT/ND  Extremities: (+)nonpitting edema, L>R  Neurological: no focal deficits; strength grossly intact  Back: no CVAT b/l  Skin: No rashes, no nevi    LABS:                        8.5    5.56  )-----------( 218      ( 07 Jan 2024 07:31 )             24.6     Na(140)/K(4.4)/Cl(99)/HCO3(30)/BUN(31)/Cr(2.12)Glu(100)/Ca(9.0)/Mg(--)/PO4(--)    01-08 @ 07:24  Na(139)/K(4.5)/Cl(99)/HCO3(29)/BUN(30)/Cr(2.25)Glu(119)/Ca(8.6)/Mg(--)/PO4(4.9)    01-07 @ 07:31  Na(137)/K(4.2)/Cl(100)/HCO3(28)/BUN(30)/Cr(2.09)Glu(72)/Ca(8.9)/Mg(2.0)/PO4(4.5)    01-06 @ 06:58  Na(137)/K(4.9)/Cl(102)/HCO3(22)/BUN(27)/Cr(1.61)Glu(167)/Ca(9.4)/Mg(2.2)/PO4(3.7)    01-05 @ 13:14    (1/5/24) - UA - 250glu, no protein, no blood    (2/12/20) - BUN/creatinine: 22/1.40      IMAGING:  < from: CT Angio Chest PE Protocol w/ IV Cont (01.05.24 @ 15:01) >  No pulmonary embolism.  Trace pleural effusions.  Mild asymmetric left breast skin thickening with soft tissue infiltration   laterally, incompletely imaged. Left axillary lymph nodes measuring up to   1 cm short axis. Correlate with dedicated breast imaging.  Mild peripheral reticulation and traction bronchiectasis may be related   to mild/early interstitial lung disease.    < from: TTE W or WO Ultrasound Enhancing Agent (01.06.24 @ 10:06) >   1. Left ventricular cavity is normal. Left ventricular wall thickness is normal. Left ventricular systolic function is normal. There are no regional wallmotion abnormalities seen.   2. Mildly enlarged right ventricular cavity size and probably normal systolic function.   3. Severe tricuspid regurgitation.   4. Device lead is visualized in the right heart.          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 I do not believe she has BRIAN s/p CTPA. The rise in creatinine since admission is rather mild; it would not be unreasonable here to diurese, especially given her shortness of breath/swelling.    (3)Lytes - acceptable    (4)CV - L-sided swelling - likely primarily due to distributive changes associated with inflammatory breast CA; likely also a component of R-sided CHF in association with interstitial lung disease/cor pulmonale      RECOMMEND:  (1)Can initiate Lasix 40mg IV daily   (2)Dose new meds for GFR 20-30  (3)Workup for inflammatory breast cancer per primary team    Thank you for involving [facility] in this patient's care.    With warm regards,    Yoel Suarez MD   [facility]  Office: [phone]  Cell: [phone]             HPI: Ms. Bautista is an 83 year-old woman with history of multiple medical issues including morbid obesity, hypertension, type 2 diabetes mellitus, and AV block s/p PPM. 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 Ms. Bautista's creatinine on admission was 1.61mg/dL; it has since risen to above 2mg/dL. In light of the azotemia, a renal consultation was requested.    Home meds including Lasix, Kerendia, Jardiance, and Hydralazine. She received Lasix 40mg IV x 1 in the ER.      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 No nausea, vomiting, or hematemesis; No diarrhea or constipation. No melena or hematochezia.  GENITOURINARY: No dysuria, frequency or hematuria  NEUROLOGICAL: No numbness or weakness  SKIN: No itching, burning, rashes, or lesions   All other review of systems is negative unless indicated above.    VITAL:  T(C): , Max: 37.3 (01-07-24 @ 20:52)  T(F): , Max: 99.2 (01-07-24 @ 20:52)  HR: 70 (01-08-24 @ 04:54)  BP: 163/70 (01-08-24 @ 04:54)  RR: 18 (01-08-24 @ 04:54)  SpO2: 97% (01-08-24 @ 04:54)  Wt(kg): --    PHYSICAL EXAM:  Constitutional: Obese, lethargic, mildly confused  HEENT: NCAT, DMM  Neck: Supple, No JVD  Respiratory: CTA-b/l  Cardiovascular: RRR s1s2, no m/r/g  Gastrointestinal: BS+, soft, NT/ND  Extremities: (+)nonpitting edema, L>R  Neurological: no focal deficits; strength grossly intact  Back: no CVAT b/l  Skin: No rashes, no nevi    LABS:                        8.5    5.56  )-----------( 218      ( 07 Jan 2024 07:31 )             24.6     Na(140)/K(4.4)/Cl(99)/HCO3(30)/BUN(31)/Cr(2.12)Glu(100)/Ca(9.0)/Mg(--)/PO4(--)    01-08 @ 07:24  Na(139)/K(4.5)/Cl(99)/HCO3(29)/BUN(30)/Cr(2.25)Glu(119)/Ca(8.6)/Mg(--)/PO4(4.9)    01-07 @ 07:31  Na(137)/K(4.2)/Cl(100)/HCO3(28)/BUN(30)/Cr(2.09)Glu(72)/Ca(8.9)/Mg(2.0)/PO4(4.5)    01-06 @ 06:58  Na(137)/K(4.9)/Cl(102)/HCO3(22)/BUN(27)/Cr(1.61)Glu(167)/Ca(9.4)/Mg(2.2)/PO4(3.7)    01-05 @ 13:14    (1/5/24) - UA - 250glu, no protein, no blood    (2/12/20) - BUN/creatinine: 22/1.40      IMAGING:  < from: CT Angio Chest PE Protocol w/ IV Cont (01.05.24 @ 15:01) >  No pulmonary embolism.  Trace pleural effusions.  Mild asymmetric left breast skin thickening with soft tissue infiltration   laterally, incompletely imaged. Left axillary lymph nodes measuring up to   1 cm short axis. Correlate with dedicated breast imaging.  Mild peripheral reticulation and traction bronchiectasis may be related   to mild/early interstitial lung disease.    < from: TTE W or WO Ultrasound Enhancing Agent (01.06.24 @ 10:06) >   1. Left ventricular cavity is normal. Left ventricular wall thickness is normal. Left ventricular systolic function is normal. There are no regional wallmotion abnormalities seen.   2. Mildly enlarged right ventricular cavity size and probably normal systolic function.   3. Severe tricuspid regurgitation.   4. Device lead is visualized in the right heart.          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 I do not believe she has BRIAN s/p CTPA. The rise in creatinine since admission is rather mild; it would not be unreasonable here to diurese, especially given her shortness of breath/swelling.    (3)Lytes - acceptable    (4)CV - L-sided swelling - likely primarily due to distributive changes associated with inflammatory breast CA; likely also a component of R-sided CHF in association with interstitial lung disease/cor pulmonale      RECOMMEND:  (1)Can initiate Lasix 40mg IV daily   (2)Dose new meds for GFR 20-30  (3)Workup for inflammatory breast cancer per primary team    Thank you for involving [facility] in this patient's care.    With warm regards,    Yoel Suarez MD   [facility]  Office: [phone]  Cell: [phone]

## 2024-01-08 NOTE — DIETITIAN INITIAL EVALUATION ADULT - NSFNSADHERENCEPTAFT_GEN_A_CORE
Pt noted with hx of T2DM. Reports taking Humulin, Jardiance, and Januvia PTA. Reports checking fingersticks 1x/day with typical readings WNL ~125 mg/dl. A1c 6.9% suggests adequate glycemic management.

## 2024-01-08 NOTE — DIETITIAN NUTRITION RISK NOTIFICATION - TREATMENT: THE FOLLOWING DIET HAS BEEN RECOMMENDED
Diet, Regular:   Consistent Carbohydrate {Evening Snack} (CSTCHOSN) (01-05-24 @ 19:49) [Active]

## 2024-01-08 NOTE — CONSULT NOTE ADULT - SUBJECTIVE AND OBJECTIVE BOX
[facility]  Luisito Mario PA-C  [address] [place] [address]  [phone]      Chief Complaint:  Patient is a 83y old  Female who presents with a chief complaint of Heart failure    Per chart, pt is an 83 year old Female with PMH of Bradycardia s/p PPM, AFib on Eliquis, HTN, CKD, T2DM, presenting c/o SOB on exertion, cough w/ white sputum x2 wks. Also reports L-sided swelling (arm, breast and leg).  Had DVT, US of LUE and bilateral LLE, which were negative. 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    (2024 11:18)      HPI: 83F hx of bradycardia s/p PPM presenting with 2 weeks of dyspnea worse with exertion, cough with white sputum, intermittent substernal CP that arises from exertion. Also reports L-sided swelling (arm, breast and leg). Had DVT ultrasounds of LUE and bilateral LLE, which were negative. 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     GI asked to consult for anemia. Pt seen and examined with daughter at bedside. No hx GI bleeding, denies melena/hematochezia. No hx EGD/colonoscopy.     Allergies:  lisinopril (Hives)  penicillin (Unknown)      Medications:  acetaminophen     Tablet .. 650 milliGRAM(s) Oral every 6 hours PRN  albuterol    90 MICROgram(s) HFA Inhaler 2 Puff(s) Inhalation every 6 hours PRN  atorvastatin 40 milliGRAM(s) Oral at bedtime  benzonatate 100 milliGRAM(s) Oral every 8 hours PRN  brimonidine 0.2% Ophthalmic Solution 1 Drop(s) Both EYES daily  chlorhexidine 2% Cloths 1 Application(s) Topical daily  dextrose 5%. 1000 milliLiter(s) IV Continuous <Continuous>  dextrose 5%. 1000 milliLiter(s) IV Continuous <Continuous>  dextrose 50% Injectable 25 Gram(s) IV Push once  dextrose 50% Injectable 12.5 Gram(s) IV Push once  dextrose 50% Injectable 25 Gram(s) IV Push once  dextrose Oral Gel 15 Gram(s) Oral once PRN  furosemide   Injectable 40 milliGRAM(s) IV Push daily  glucagon  Injectable 1 milliGRAM(s) IntraMuscular once  guaiFENesin Oral Liquid (Sugar-Free) 100 milliGRAM(s) Oral every 6 hours PRN  insulin glargine Injectable (LANTUS) 20 Unit(s) SubCutaneous every morning  insulin glargine Injectable (LANTUS) 10 Unit(s) SubCutaneous at bedtime  insulin lispro (ADMELOG) corrective regimen sliding scale   SubCutaneous three times a day before meals  insulin lispro (ADMELOG) corrective regimen sliding scale   SubCutaneous at bedtime  metoprolol succinate ER 25 milliGRAM(s) Oral daily      PMHX/PSHX:  Morbid obesity    HTN (hypertension)    Type 2 diabetes mellitus    HLD (hyperlipidemia)    2nd degree AV block    Glaucoma of left eye    Chronic atrial fibrillation    Pacemaker    H/O hernia repair    History of cholecystectomy    H/O left knee surgery    Cataract of right eye    Glaucoma        Family history:  Type 2 diabetes mellitus        Social History:     ROS:     General:  No wt loss, fevers, chills, night sweats, fatigue,   Eyes:  Good vision, no reported pain  ENT:  No sore throat, pain, runny nose, dysphagia  CV:  No pain, palpitations, hypo/hypertension  Resp:  + dyspnea,+ cough, tachypnea, wheezing  GI:  No pain, No nausea, No vomiting, No diarrhea, No constipation, No weight loss, No fever, No pruritis, No rectal bleeding, No tarry stools, No dysphagia,  :  No pain, bleeding, incontinence, nocturia  Muscle:  No pain, weakness  Neuro:  No weakness, tingling, memory problems  Psych:  No fatigue, insomnia, mood problems, depression  Endocrine:  No polyuria, polydipsia, cold/heat intolerance  Heme:  No petechiae, ecchymosis, easy bruisability  Skin:  No rash, tattoos, scars, +edema      PHYSICAL EXAM:   Vital Signs:  Vital Signs Last 24 Hrs  T(C): 36.8 (2024 11:10), Max: 37.3 (2024 20:52)  T(F): 98.3 (2024 11:10), Max: 99.2 (2024 20:52)  HR: 70 (2024 11:10) (69 - 73)  BP: 128/74 (2024 11:10) (94/63 - 163/70)  BP(mean): --  RR: 18 (2024 11:10) (18 - 18)  SpO2: 96% (2024 11:10) (96% - 97%)    Parameters below as of 2024 11:10  Patient On (Oxygen Delivery Method): room air      Daily     Daily Weight in k.7 (2024 08:18)    GENERAL:  Appears stated age,   HEENT:  NC/AT,    CHEST:  Full & symmetric excursion,   HEART:  Regular rhythm  ABDOMEN:  Soft, non-tender, non-distended,   EXTEREMITIES:  no cyanosis,clubbing or edema  SKIN:  No rash  NEURO:  Alert,    LABS:                        8.5    5.56  )-----------( 218      ( 2024 07:31 )             24.6     01-08    140  |  99  |  31<H>  ----------------------------<  100<H>  4.4   |  30  |  2.12<H>    Ca    9.0      2024 07:24  Phos  4.9     01-07    TPro  7.2  /  Alb  3.1<L>  /  TBili  0.4  /  DBili  x   /  AST  29  /  ALT  23  /  AlkPhos  164<H>  01-08    LIVER FUNCTIONS - ( 2024 07:24 )  Alb: 3.1 g/dL / Pro: 7.2 g/dL / ALK PHOS: 164 U/L / ALT: 23 U/L / AST: 29 U/L / GGT: x             Urinalysis Basic - ( 2024 07:24 )    Color: x / Appearance: x / SG: x / pH: x  Gluc: 100 mg/dL / Ketone: x  / Bili: x / Urobili: x   Blood: x / Protein: x / Nitrite: x   Leuk Esterase: x / RBC: x / WBC x   Sq Epi: x / Non Sq Epi: x / Bacteria: x          Imaging:           [facility]  Luisito Mario PA-C  [address] [place] [address]  [phone]      Chief Complaint:  Patient is a 83y old  Female who presents with a chief complaint of Heart failure    Per chart, pt is an 83 year old Female with PMH of Bradycardia s/p PPM, AFib on Eliquis, HTN, CKD, T2DM, presenting c/o SOB on exertion, cough w/ white sputum x2 wks. Also reports L-sided swelling (arm, breast and leg).  Had DVT, US of LUE and bilateral LLE, which were negative. 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    (2024 11:18)      HPI: 83F hx of bradycardia s/p PPM presenting with 2 weeks of dyspnea worse with exertion, cough with white sputum, intermittent substernal CP that arises from exertion. Also reports L-sided swelling (arm, breast and leg). Had DVT ultrasounds of LUE and bilateral LLE, which were negative. 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     GI asked to consult for anemia. Pt seen and examined with daughter at bedside. No hx GI bleeding, denies melena/hematochezia. No hx EGD/colonoscopy.     Allergies:  lisinopril (Hives)  penicillin (Unknown)      Medications:  acetaminophen     Tablet .. 650 milliGRAM(s) Oral every 6 hours PRN  albuterol    90 MICROgram(s) HFA Inhaler 2 Puff(s) Inhalation every 6 hours PRN  atorvastatin 40 milliGRAM(s) Oral at bedtime  benzonatate 100 milliGRAM(s) Oral every 8 hours PRN  brimonidine 0.2% Ophthalmic Solution 1 Drop(s) Both EYES daily  chlorhexidine 2% Cloths 1 Application(s) Topical daily  dextrose 5%. 1000 milliLiter(s) IV Continuous <Continuous>  dextrose 5%. 1000 milliLiter(s) IV Continuous <Continuous>  dextrose 50% Injectable 25 Gram(s) IV Push once  dextrose 50% Injectable 12.5 Gram(s) IV Push once  dextrose 50% Injectable 25 Gram(s) IV Push once  dextrose Oral Gel 15 Gram(s) Oral once PRN  furosemide   Injectable 40 milliGRAM(s) IV Push daily  glucagon  Injectable 1 milliGRAM(s) IntraMuscular once  guaiFENesin Oral Liquid (Sugar-Free) 100 milliGRAM(s) Oral every 6 hours PRN  insulin glargine Injectable (LANTUS) 20 Unit(s) SubCutaneous every morning  insulin glargine Injectable (LANTUS) 10 Unit(s) SubCutaneous at bedtime  insulin lispro (ADMELOG) corrective regimen sliding scale   SubCutaneous three times a day before meals  insulin lispro (ADMELOG) corrective regimen sliding scale   SubCutaneous at bedtime  metoprolol succinate ER 25 milliGRAM(s) Oral daily      PMHX/PSHX:  Morbid obesity    HTN (hypertension)    Type 2 diabetes mellitus    HLD (hyperlipidemia)    2nd degree AV block    Glaucoma of left eye    Chronic atrial fibrillation    Pacemaker    H/O hernia repair    History of cholecystectomy    H/O left knee surgery    Cataract of right eye    Glaucoma        Family history:  Type 2 diabetes mellitus        Social History:     ROS:     General:  No wt loss, fevers, chills, night sweats, fatigue,   Eyes:  Good vision, no reported pain  ENT:  No sore throat, pain, runny nose, dysphagia  CV:  No pain, palpitations, hypo/hypertension  Resp:  + dyspnea,+ cough, tachypnea, wheezing  GI:  No pain, No nausea, No vomiting, No diarrhea, No constipation, No weight loss, No fever, No pruritis, No rectal bleeding, No tarry stools, No dysphagia,  :  No pain, bleeding, incontinence, nocturia  Muscle:  No pain, weakness  Neuro:  No weakness, tingling, memory problems  Psych:  No fatigue, insomnia, mood problems, depression  Endocrine:  No polyuria, polydipsia, cold/heat intolerance  Heme:  No petechiae, ecchymosis, easy bruisability  Skin:  No rash, tattoos, scars, +edema      PHYSICAL EXAM:   Vital Signs:  Vital Signs Last 24 Hrs  T(C): 36.8 (2024 11:10), Max: 37.3 (2024 20:52)  T(F): 98.3 (2024 11:10), Max: 99.2 (2024 20:52)  HR: 70 (2024 11:10) (69 - 73)  BP: 128/74 (2024 11:10) (94/63 - 163/70)  BP(mean): --  RR: 18 (2024 11:10) (18 - 18)  SpO2: 96% (2024 11:10) (96% - 97%)    Parameters below as of 2024 11:10  Patient On (Oxygen Delivery Method): room air      Daily     Daily Weight in k.7 (2024 08:18)    GENERAL:  Appears stated age,   HEENT:  NC/AT,    CHEST:  Full & symmetric excursion,   HEART:  Regular rhythm  ABDOMEN:  Soft, non-tender, non-distended,   EXTEREMITIES:  no cyanosis,clubbing or edema  SKIN:  No rash  NEURO:  Alert,    LABS:                        8.5    5.56  )-----------( 218      ( 2024 07:31 )             24.6     01-08    140  |  99  |  31<H>  ----------------------------<  100<H>  4.4   |  30  |  2.12<H>    Ca    9.0      2024 07:24  Phos  4.9     01-07    TPro  7.2  /  Alb  3.1<L>  /  TBili  0.4  /  DBili  x   /  AST  29  /  ALT  23  /  AlkPhos  164<H>  01-08    LIVER FUNCTIONS - ( 2024 07:24 )  Alb: 3.1 g/dL / Pro: 7.2 g/dL / ALK PHOS: 164 U/L / ALT: 23 U/L / AST: 29 U/L / GGT: x             Urinalysis Basic - ( 2024 07:24 )    Color: x / Appearance: x / SG: x / pH: x  Gluc: 100 mg/dL / Ketone: x  / Bili: x / Urobili: x   Blood: x / Protein: x / Nitrite: x   Leuk Esterase: x / RBC: x / WBC x   Sq Epi: x / Non Sq Epi: x / Bacteria: x          Imaging:

## 2024-01-08 NOTE — DIETITIAN INITIAL EVALUATION ADULT - NSFNSGIIOFT_GEN_A_CORE
Denies nausea, vomiting, constipation, diarrhea. Reports last BM 1/8. Pt not currently ordered for bowel regimen.

## 2024-01-08 NOTE — DIETITIAN INITIAL EVALUATION ADULT - REASON INDICATOR FOR ASSESSMENT
RD consult for Pressure Injury Stage 2 or >.  Source: pt (lethargic at time of visit, majority of interview deferred to dtr at bedside), pt's daughter Rinku at bedside, medical record. Chart reviewed, events noted.

## 2024-01-08 NOTE — DIETITIAN INITIAL EVALUATION ADULT - ADD RECOMMEND
1) Recommend Consistent Carbohydrate, Low Sodium diet.  2) Recommend Nephro-Samira, pending no medical contraindications, to aid in the prevention of micronutrient deficiencies.   3) Monitor PO intake, GI tolerance, skin integrity, labs, weight, and bowel movement regularity.   4) Honor food preferences as feasible. Assist with meals PRN and encourage PO intake.  5) RD remains available upon request and will follow-up per protocol.  6) BMI >40 alert placed in chart

## 2024-01-08 NOTE — CONSULT NOTE ADULT - ASSESSMENT
iron deficiency anemia   congestive heart failure   CKD    cbc daily   no signs of GI bleeding   stool occult pending   transfuse PRN   would benefit from IV iron  diuresis per cardiology   US of breast pending   heme/onc consult pending   no plans for EGD/colonoscopy at this time  will follow   d/w pt and daughter at bedside iron deficiency anemia   congestive heart failure   CKD    cbc daily   no signs of GI bleeding   stool occult pending   transfuse PRN   would likely benefit from IV iron  diuresis per cardiology   US of breast pending   heme/onc consult pending   no plans for EGD/colonoscopy at this time  will follow   d/w pt and daughter at bedside

## 2024-01-08 NOTE — CHART NOTE - NSCHARTNOTEFT_GEN_A_CORE
Provider called by radiology department inquiring about left breast US. Provider explained concern for inflammatory carcinoma of left breast based on mammogram and painful, erythematous breast.     Per radiology [facility] is not a breast center and the radiologists are unable to give results for a concern for malignancy. Patient would have to be transferred to another hospital that is a breast center. Breast US here would be performed to r/o abscess.     Dr. Ramachandran informed. Instructed to page surgery, who was waiting for breast US prior to evaluating the patient. Provider called by radiology department inquiring about left breast US. Provider explained concern for inflammatory carcinoma of left breast based on mammogram and painful, erythematous breast.     Per radiology [facility] is not a breast center and the radiologists are unable to give results for a concern for malignancy. Patient would have to be transferred to another hospital that is a breast center. Breast US here would be performed to r/o abscess.     Dr. Ramachandran informed. Instructed to page surgery, who was waiting for breast US prior to evaluating the patient. Provider called by radiology department inquiring about left breast US. Provider explained concern for inflammatory carcinoma of left breast based on mammogram and painful, erythematous breast.     Per radiology [facility] is not a breast center and the radiologists are unable to give results for a concern for malignancy. Patient would have to be transferred to another hospital that is a breast center. Breast US here would be performed to r/o abscess.     Dr. Ramachandran informed. Instructed to page surgery, who was waiting for breast US prior to evaluating the patient. Surgery paged, awaiting return-page.

## 2024-01-08 NOTE — PROGRESS NOTE ADULT - ASSESSMENT
83 f with  HTN HLD mild dementia, known cardiomyopathy, baseline creainine unknown , PPM with short bursts PAF, metoprolol and diuretics had been stopped by another provider, possible interstitial lung disease,  with dyspnea, elevated BNP, elevated creatiine, bilateral LCE edema nad left arm /left breast edema. ECHO LV fx preserved.  creatinine increased, felt to have element CPKD, found iron deficient anemia    Acute on Chronic Diastolic  Congestive Heart Failure  - lasix restarted by nephrology  -BP was borderline, thus hydralazine was held, IF BP elevated ,would restaert hydralazine 10 TID  - f u creatinine    SSS/ PAF  - hold DOAC with iron deificency anemia  - recheck PPM ( [employer])    Breast swelling  - ultrasound as per medical tean    Anemia  - consider iron transfusion,   - anticoagulation stopped    - hold hydralazine  -recheck creatinine in AM     83 f with  HTN HLD mild dementia, known cardiomyopathy, baseline creainine unknown , PPM with short bursts PAF, metoprolol and diuretics had been stopped by another provider, possible interstitial lung disease,  with dyspnea, elevated BNP, elevated creatiine, bilateral LCE edema nad left arm /left breast edema. ECHO LV fx preserved.  creatinine increased, felt to have element CPKD, found iron deficient anemia    Acute on Chronic Diastolic  Congestive Heart Failure  - lasix restarted by nephrology  -BP was borderline, thus hydralazine was held, IF BP elevated ,would restaert hydralazine 10 TID  - f u creatinine    SSS/ PAF  - hold DOAC with iron deificency anemia  - recheck PPM ( [employer])    Breast swelling  - ultrasound as per medical tean    Anemia  - consider iron transfusion,   - anticoagulation stopped    - hold hydralazine  -recheck creatinine in AM

## 2024-01-08 NOTE — DIETITIAN INITIAL EVALUATION ADULT - PERTINENT LABORATORY DATA
01-08    140  |  99  |  31<H>  ----------------------------<  100<H>  4.4   |  30  |  2.12<H>    Ca    9.0      08 Jan 2024 07:24  Phos  4.9     01-07    TPro  7.2  /  Alb  3.1<L>  /  TBili  0.4  /  DBili  x   /  AST  29  /  ALT  23  /  AlkPhos  164<H>  01-08    CAPILLARY BLOOD GLUCOSE  POCT Blood Glucose.: 133 mg/dL (08 Jan 2024 07:40)  POCT Blood Glucose.: 188 mg/dL (07 Jan 2024 21:14)  POCT Blood Glucose.: 170 mg/dL (07 Jan 2024 17:10)  POCT Blood Glucose.: 143 mg/dL (07 Jan 2024 11:49)    A1C with Estimated Average Glucose Result: 6.9 % (01-06-24 @ 06:55)

## 2024-01-08 NOTE — DIETITIAN INITIAL EVALUATION ADULT - REASON FOR ADMISSION
Heart failure    Per chart, pt is an 83 year old Female with PMH of Bradycardia s/p PPM, AFib on Eliquis, HTN, CKD, T2DM, presenting c/o SOB on exertion, cough w/ white sputum x2 wks. Also reports L-sided swelling (arm, breast and leg).  Had DVT, US of LUE and bilateral LLE, which were negative. 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

## 2024-01-08 NOTE — DIETITIAN INITIAL EVALUATION ADULT - OTHER INFO
Reports  lbs. Denies recent wt changes.   Dosing wt: 250 lbs (01-04, stated)  Wt history per chart: 294.7 lbs (01-08), 288.8 lbs (01-07). Wt increase likely in setting of fluid shifts vs bedscale variances. RD to continue to monitor weight trends as able/available.     Additional nutrition-related concerns:  - BG readings since admission =  mg/dl. Currently being managed with lantus 2x/day, and admelog ISS.  - hx HF; admitted with acute on chronic HF. Ordered for IV lasix daily.  - LIANNA on CKD3b. K+ WNL; Phos elevated 1/7 (4.9 mg/dl).

## 2024-01-08 NOTE — CHART NOTE - NSCHARTNOTEFT_GEN_A_CORE
Wound Care Team Note:    Request for wound care consult for foot/toe wound received and referred to Podiatry. Please refer to Podiatry for management. Will not follow.    Minal Hernandez NP-C, CWOCN via TEAMS

## 2024-01-08 NOTE — DIETITIAN INITIAL EVALUATION ADULT - PERSON TAUGHT/METHOD
Discussed possible pressure injury, pt and daughter deny presence of. Discussed need for continued diabetic therapeutic dietary restrictions as well as low sodium diet. Discussed ordering foods with textures able to be tolerated well by pt. Made aware RD remains available upon request and will follow-up per protocol./verbal instruction/patient instructed/daughter instructed

## 2024-01-08 NOTE — DIETITIAN INITIAL EVALUATION ADULT - OTHER CALCULATIONS
Fluid needs deferred to team in setting of acute on chronic HF. Energy and protein needs based on IBW: 125 lbs/56.6 kg with consideration for BMI >40, LIANNA on CKD3b, acute on chronic HF, possible pressure injury, and concern for L breast inflammatory carcinoma.

## 2024-01-08 NOTE — DIETITIAN INITIAL EVALUATION ADULT - NSFNSNUTRCHEWSWALLOWFT_GEN_A_CORE
Pt's daughter endorses pt with some difficulty chewing certain textures due to having dentures, including corea. Denies swallowing difficulty. Daughter amenable to continue regular texture diet ot help optimize menu choices.

## 2024-01-08 NOTE — PROGRESS NOTE ADULT - SUBJECTIVE AND OBJECTIVE BOX
Patient is a 83y old  Female who presents with a chief complaint of Heart failure    Per chart, pt is an 83 year old Female with PMH of Bradycardia s/p PPM, AFib on Eliquis, HTN, CKD, T2DM, presenting c/o SOB on exertion, cough w/ white sputum x2 wks. Also reports L-sided swelling (arm, breast and leg).  Had DVT, US of LUE and bilateral LLE, which were negative. 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    (08 Jan 2024 11:18)      SUBJECTIVE / OVERNIGHT EVENTS: No new complaints. Family at bedside.   Review of Systems  chest pain no  palpitations no  sob no  nausea no  headache no    MEDICATIONS  (STANDING):  atorvastatin 40 milliGRAM(s) Oral at bedtime  brimonidine 0.2% Ophthalmic Solution 1 Drop(s) Both EYES daily  chlorhexidine 2% Cloths 1 Application(s) Topical daily  dextrose 5%. 1000 milliLiter(s) (50 mL/Hr) IV Continuous <Continuous>  dextrose 5%. 1000 milliLiter(s) (100 mL/Hr) IV Continuous <Continuous>  dextrose 50% Injectable 25 Gram(s) IV Push once  dextrose 50% Injectable 12.5 Gram(s) IV Push once  dextrose 50% Injectable 25 Gram(s) IV Push once  furosemide   Injectable 40 milliGRAM(s) IV Push daily  glucagon  Injectable 1 milliGRAM(s) IntraMuscular once  insulin glargine Injectable (LANTUS) 10 Unit(s) SubCutaneous at bedtime  insulin glargine Injectable (LANTUS) 20 Unit(s) SubCutaneous every morning  insulin lispro (ADMELOG) corrective regimen sliding scale   SubCutaneous three times a day before meals  insulin lispro (ADMELOG) corrective regimen sliding scale   SubCutaneous at bedtime  metoprolol succinate ER 25 milliGRAM(s) Oral daily  Nephro-patsy 1 Tablet(s) Oral daily    MEDICATIONS  (PRN):  acetaminophen     Tablet .. 650 milliGRAM(s) Oral every 6 hours PRN Temp greater or equal to 38.5C (101.3F), Mild Pain (1 - 3)  albuterol    90 MICROgram(s) HFA Inhaler 2 Puff(s) Inhalation every 6 hours PRN Shortness of Breath and/or Wheezing  benzonatate 100 milliGRAM(s) Oral every 8 hours PRN Cough  dextrose Oral Gel 15 Gram(s) Oral once PRN Blood Glucose LESS THAN 70 milliGRAM(s)/deciliter  guaiFENesin Oral Liquid (Sugar-Free) 100 milliGRAM(s) Oral every 6 hours PRN Cough      Vital Signs Last 24 Hrs  T(C): 36.9 (08 Jan 2024 20:11), Max: 37.3 (07 Jan 2024 20:52)  T(F): 98.5 (08 Jan 2024 20:11), Max: 99.2 (07 Jan 2024 20:52)  HR: 69 (08 Jan 2024 20:11) (69 - 70)  BP: 121/74 (08 Jan 2024 20:11) (104/60 - 163/70)  BP(mean): --  RR: 18 (08 Jan 2024 20:11) (18 - 18)  SpO2: 98% (08 Jan 2024 20:11) (96% - 98%)    Parameters below as of 08 Jan 2024 20:11  Patient On (Oxygen Delivery Method): room air        PHYSICAL EXAM:  GENERAL: NAD, well-developed   HEAD:  Atraumatic, Normocephalic  EYES: EOMI, PERRLA, conjunctiva and sclera clear  NECK: Supple, No JVD  CHEST/LUNG: Clear to auscultation bilaterally; No wheeze L breast with erythema tender   HEART: Regular rate and rhythm; No murmurs, rubs, or gallops  ABDOMEN: Soft, Nontender, Nondistended; Bowel sounds present  EXTREMITIES:  2+ bipedal edema  PSYCH: AAOx3  NEUROLOGY: non-focal  SKIN: No rashes or lesions    LABS:                        8.5    5.56  )-----------( 218      ( 07 Jan 2024 07:31 )             24.6     01-08    140  |  99  |  31<H>  ----------------------------<  100<H>  4.4   |  30  |  2.12<H>    Ca    9.0      08 Jan 2024 07:24  Phos  4.9     01-07    TPro  7.2  /  Alb  3.1<L>  /  TBili  0.4  /  DBili  x   /  AST  29  /  ALT  23  /  AlkPhos  164<H>  01-08          Urinalysis Basic - ( 08 Jan 2024 07:24 )    Color: x / Appearance: x / SG: x / pH: x  Gluc: 100 mg/dL / Ketone: x  / Bili: x / Urobili: x   Blood: x / Protein: x / Nitrite: x   Leuk Esterase: x / RBC: x / WBC x   Sq Epi: x / Non Sq Epi: x / Bacteria: x          RADIOLOGY & ADDITIONAL TESTS:    Imaging Personally Reviewed:    Consultant(s) Notes Reviewed:      Care Discussed with Consultants/Other Providers:

## 2024-01-08 NOTE — PROGRESS NOTE ADULT - SUBJECTIVE AND OBJECTIVE BOX
INTERVAL HPI/OVERNIGHT EVENTS: remains in NSR , edema improved, still on lasix 40 IV daily creatinine mdoerately elevated, feelt to have CKD ,anemic, iron deficient    MEDICATIONS  (STANDING):  atorvastatin 40 milliGRAM(s) Oral at bedtime  brimonidine 0.2% Ophthalmic Solution 1 Drop(s) Both EYES daily  chlorhexidine 2% Cloths 1 Application(s) Topical daily  dextrose 5%. 1000 milliLiter(s) (100 mL/Hr) IV Continuous <Continuous>  dextrose 5%. 1000 milliLiter(s) (50 mL/Hr) IV Continuous <Continuous>  dextrose 50% Injectable 25 Gram(s) IV Push once  dextrose 50% Injectable 25 Gram(s) IV Push once  dextrose 50% Injectable 12.5 Gram(s) IV Push once  furosemide   Injectable 40 milliGRAM(s) IV Push daily  glucagon  Injectable 1 milliGRAM(s) IntraMuscular once  insulin glargine Injectable (LANTUS) 20 Unit(s) SubCutaneous every morning  insulin glargine Injectable (LANTUS) 10 Unit(s) SubCutaneous at bedtime  insulin lispro (ADMELOG) corrective regimen sliding scale   SubCutaneous three times a day before meals  insulin lispro (ADMELOG) corrective regimen sliding scale   SubCutaneous at bedtime  metoprolol succinate ER 25 milliGRAM(s) Oral daily  Nephro-patsy 1 Tablet(s) Oral daily    MEDICATIONS  (PRN):  acetaminophen     Tablet .. 650 milliGRAM(s) Oral every 6 hours PRN Temp greater or equal to 38.5C (101.3F), Mild Pain (1 - 3)  albuterol    90 MICROgram(s) HFA Inhaler 2 Puff(s) Inhalation every 6 hours PRN Shortness of Breath and/or Wheezing  benzonatate 100 milliGRAM(s) Oral every 8 hours PRN Cough  dextrose Oral Gel 15 Gram(s) Oral once PRN Blood Glucose LESS THAN 70 milliGRAM(s)/deciliter  guaiFENesin Oral Liquid (Sugar-Free) 100 milliGRAM(s) Oral every 6 hours PRN Cough      Allergies    lisinopril (Hives)  penicillin (Unknown)    Intolerances      ROS:  General: Pt denies recent weight loss/fever/chills    Neurological: denies numbness or  sensation loss    Cardiovascular: denies chest pain/palpitations/ + leg edema    Respiratory and Thorax: + SOB/cough/wheezing    Gastrointestinal: denies abdominal pain/diarrhea/constipation/bloody stool    Genitourinary: denies urinary frequency/urgency/ dysuria    Musculoskeletal: denies joint pain or swelling, denies restricted motion    Hematologic: denies abnormal bleeding  	    	  	    		        	    	            Vital Signs Last 24 Hrs  T(C): 36.8 (2024 11:10), Max: 37.3 (2024 20:52)  T(F): 98.3 (2024 11:10), Max: 99.2 (2024 20:52)  HR: 70 (2024 16:38) (70 - 70)  BP: 123/61 (2024 16:38) (104/60 - 163/70)  BP(mean): --  RR: 18 (2024 11:10) (18 - 18)  SpO2: 96% (2024 11:10) (96% - 97%)    Parameters below as of 2024 11:10  Patient On (Oxygen Delivery Method): room air      Daily     Daily Weight in k.7 (2024 08:18)     @ : @ 07:00  --------------------------------------------------------  IN: 480 mL / OUT: 3050 mL / NET: -2570 mL     @ 07: @ 20:07  --------------------------------------------------------  IN: 440 mL / OUT: 1450 mL / NET: -1010 mL      Physical Exam:    wdwn female  mild JVD   cor RRR  lung clear   ext mild edema      LABS:                        8.5    5.56  )-----------( 218      ( 2024 07:31 )             24.6     01-08    140  |  99  |  31<H>  ----------------------------<  100<H>  4.4   |  30  |  2.12<H>    Ca    9.0      2024 07:24  Phos  4.9     -    TPro  7.2  /  Alb  3.1<L>  /  TBili  0.4  /  DBili  x   /  AST  29  /  ALT  23  /  AlkPhos  164<H>        Urinalysis Basic - ( 2024 07:24 )    Color: x / Appearance: x / SG: x / pH: x  Gluc: 100 mg/dL / Ketone: x  / Bili: x / Urobili: x   Blood: x / Protein: x / Nitrite: x   Leuk Esterase: x / RBC: x / WBC x   Sq Epi: x / Non Sq Epi: x / Bacteria: x        RADIOLOGY & ADDITIONAL TESTS:    TELE:    EKG:

## 2024-01-08 NOTE — DIETITIAN INITIAL EVALUATION ADULT - ORAL INTAKE PTA/DIET HISTORY
Pt's daughter reports pt with good appetite/PO intake PTA, was following a diabetic therapeutic diet PTA.   Confirms NKFA.

## 2024-01-08 NOTE — DIETITIAN INITIAL EVALUATION ADULT - REASON
Nutrition-focused physical examination deferred at this time - pt with reported stable wt hx, and good PO intake in-house/PTA. No overt signs of fat/muscle wasting visually observed.

## 2024-01-08 NOTE — PROGRESS NOTE ADULT - ASSESSMENT
83 f with     Acute on Chronic Systolic Congestive Heart Failure  - telemetry  - diurese  - echo  - cardiology follow    A Fib  - rate control  - AC on hold 2 to drop in Hct     L Breast abnormality  - US L breast - radiology not able to evaluate for possible breast ca  - Surgical evaluation     Anemia iron deficiency  - follow Hct  - iron studies noted  - Iron supplementation   - Gastroenterology evaluation noted. No EGD/ Colon.    Diabetes Mellitus  - BS control  - ADA diet     CKD  - follow cr, lytes  - Nephrology evaluation Dr. Suarez called    PPM  - check    HTN control    Glaucoma  - continue gtt     Obesity morbid  - nutrition education    PT    DVT prophylaxis  - PAS    d/w patient , ACP  and daughter     Jamal Ramachandran MD phone [phone]  83 f with     Acute on Chronic Systolic Congestive Heart Failure  - telemetry  - diurese  - echo  - cardiology follow    A Fib  - rate control  - AC on hold 2 to drop in Hct     L Breast abnormality  - US L breast - radiology not able to evaluate for possible breast ca  - Surgical evaluation     Anemia iron deficiency  - follow Hct  - iron studies noted  - Iron supplementation   - Gastroenterology evaluation noted. No EGD/ Colon.    Diabetes Mellitus  - BS control  - ADA diet     CKD  - follow cr, lytes  - Nephrology evaluation Dr. Suarez called    PPM  - check    HTN control    Glaucoma  - continue gtt     Obesity morbid  - nutrition education    PT    DVT prophylaxis  - PAS    d/w patient , ACP  and daughter     Jamal Ramachandran MD phone [phone]

## 2024-01-08 NOTE — DIETITIAN INITIAL EVALUATION ADULT - NSFNSPHYEXAMSKINFT_GEN_A_CORE
Per flowsheets: Right, medial metatarsals bunion, Suspected DTI   Pt and daughter deny pt having any pressure injury. Wound care consult pending.

## 2024-01-08 NOTE — CONSULT NOTE ADULT - SUBJECTIVE AND OBJECTIVE BOX
Patient is a 83y old  Female who presents with a chief complaint of Heart failure    Per chart, pt is an 83 year old Female with PMH of Bradycardia s/p PPM, AFib on Eliquis, HTN, CKD, T2DM, presenting c/o SOB on exertion, cough w/ white sputum x2 wks. Also reports L-sided swelling (arm, breast and leg).  Had DVT, US of LUE and bilateral LLE, which were negative. 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    (08 Jan 2024 11:18)      HPI:  83F hx of bradycardia s/p PPM presenting with 2 weeks of dyspnea worse with exertion, cough with white sputum, intermittent substernal CP that arises from exertion. Also reports L-sided swelling (arm, breast and leg). Had DVT ultrasounds of LUE and bilateral LLE, which were negative. 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  (05 Jan 2024 19:26)      PAST MEDICAL & SURGICAL HISTORY:  Morbid obesity      HTN (hypertension)      Type 2 diabetes mellitus      HLD (hyperlipidemia)      2nd degree AV block      Glaucoma of left eye      Chronic atrial fibrillation      Pacemaker      H/O hernia repair      History of cholecystectomy      H/O left knee surgery      Cataract of right eye      Glaucoma  left eye          MEDICATIONS  (STANDING):  atorvastatin 40 milliGRAM(s) Oral at bedtime  brimonidine 0.2% Ophthalmic Solution 1 Drop(s) Both EYES daily  chlorhexidine 2% Cloths 1 Application(s) Topical daily  dextrose 5%. 1000 milliLiter(s) (50 mL/Hr) IV Continuous <Continuous>  dextrose 5%. 1000 milliLiter(s) (100 mL/Hr) IV Continuous <Continuous>  dextrose 50% Injectable 12.5 Gram(s) IV Push once  dextrose 50% Injectable 25 Gram(s) IV Push once  dextrose 50% Injectable 25 Gram(s) IV Push once  furosemide   Injectable 40 milliGRAM(s) IV Push daily  glucagon  Injectable 1 milliGRAM(s) IntraMuscular once  insulin glargine Injectable (LANTUS) 20 Unit(s) SubCutaneous every morning  insulin glargine Injectable (LANTUS) 10 Unit(s) SubCutaneous at bedtime  insulin lispro (ADMELOG) corrective regimen sliding scale   SubCutaneous three times a day before meals  insulin lispro (ADMELOG) corrective regimen sliding scale   SubCutaneous at bedtime  metoprolol succinate ER 25 milliGRAM(s) Oral daily    MEDICATIONS  (PRN):  acetaminophen     Tablet .. 650 milliGRAM(s) Oral every 6 hours PRN Temp greater or equal to 38.5C (101.3F), Mild Pain (1 - 3)  albuterol    90 MICROgram(s) HFA Inhaler 2 Puff(s) Inhalation every 6 hours PRN Shortness of Breath and/or Wheezing  benzonatate 100 milliGRAM(s) Oral every 8 hours PRN Cough  dextrose Oral Gel 15 Gram(s) Oral once PRN Blood Glucose LESS THAN 70 milliGRAM(s)/deciliter  guaiFENesin Oral Liquid (Sugar-Free) 100 milliGRAM(s) Oral every 6 hours PRN Cough      Allergies    lisinopril (Hives)  penicillin (Unknown)    Intolerances        VITALS:    Vital Signs Last 24 Hrs  T(C): 36.8 (08 Jan 2024 11:10), Max: 37.3 (07 Jan 2024 20:52)  T(F): 98.3 (08 Jan 2024 11:10), Max: 99.2 (07 Jan 2024 20:52)  HR: 70 (08 Jan 2024 11:10) (69 - 73)  BP: 128/74 (08 Jan 2024 11:10) (94/63 - 163/70)  BP(mean): --  RR: 18 (08 Jan 2024 11:10) (18 - 18)  SpO2: 96% (08 Jan 2024 11:10) (96% - 97%)    Parameters below as of 08 Jan 2024 11:10  Patient On (Oxygen Delivery Method): room air        LABS:                          8.5    5.56  )-----------( 218      ( 07 Jan 2024 07:31 )             24.6       01-08    140  |  99  |  31<H>  ----------------------------<  100<H>  4.4   |  30  |  2.12<H>    Ca    9.0      08 Jan 2024 07:24  Phos  4.9     01-07    TPro  7.2  /  Alb  3.1<L>  /  TBili  0.4  /  DBili  x   /  AST  29  /  ALT  23  /  AlkPhos  164<H>  01-08      CAPILLARY BLOOD GLUCOSE      POCT Blood Glucose.: 204 mg/dL (08 Jan 2024 11:48)  POCT Blood Glucose.: 133 mg/dL (08 Jan 2024 07:40)  POCT Blood Glucose.: 188 mg/dL (07 Jan 2024 21:14)  POCT Blood Glucose.: 170 mg/dL (07 Jan 2024 17:10)          LOWER EXTREMITY PHYSICAL EXAM:    Vascular: DP/PT 0/4, B/L, CFT <3 seconds B/L, Temperature gradient wnl, B/L. 1+ edema both feet  Neuro: Epicritic sensation unable to assess to the level of foot, B/L.  Musculoskeletal/Ortho: Bunion 1 MPJ right foot  Skin: healed blister 1 met head right foot  no signs of infection or ulceration both feet

## 2024-01-09 LAB
ALBUMIN SERPL ELPH-MCNC: 3.5 G/DL — SIGNIFICANT CHANGE UP (ref 3.3–5)
ALBUMIN SERPL ELPH-MCNC: 3.5 G/DL — SIGNIFICANT CHANGE UP (ref 3.3–5)
ALP SERPL-CCNC: 157 U/L — HIGH (ref 40–120)
ALP SERPL-CCNC: 157 U/L — HIGH (ref 40–120)
ALT FLD-CCNC: 26 U/L — SIGNIFICANT CHANGE UP (ref 10–45)
ALT FLD-CCNC: 26 U/L — SIGNIFICANT CHANGE UP (ref 10–45)
ANION GAP SERPL CALC-SCNC: 8 MMOL/L — SIGNIFICANT CHANGE UP (ref 5–17)
ANION GAP SERPL CALC-SCNC: 8 MMOL/L — SIGNIFICANT CHANGE UP (ref 5–17)
AST SERPL-CCNC: 38 U/L — SIGNIFICANT CHANGE UP (ref 10–40)
AST SERPL-CCNC: 38 U/L — SIGNIFICANT CHANGE UP (ref 10–40)
BILIRUB SERPL-MCNC: 0.4 MG/DL — SIGNIFICANT CHANGE UP (ref 0.2–1.2)
BILIRUB SERPL-MCNC: 0.4 MG/DL — SIGNIFICANT CHANGE UP (ref 0.2–1.2)
BUN SERPL-MCNC: 28 MG/DL — HIGH (ref 7–23)
BUN SERPL-MCNC: 28 MG/DL — HIGH (ref 7–23)
CALCIUM SERPL-MCNC: 8.9 MG/DL — SIGNIFICANT CHANGE UP (ref 8.4–10.5)
CALCIUM SERPL-MCNC: 8.9 MG/DL — SIGNIFICANT CHANGE UP (ref 8.4–10.5)
CHLORIDE SERPL-SCNC: 98 MMOL/L — SIGNIFICANT CHANGE UP (ref 96–108)
CHLORIDE SERPL-SCNC: 98 MMOL/L — SIGNIFICANT CHANGE UP (ref 96–108)
CO2 SERPL-SCNC: 30 MMOL/L — SIGNIFICANT CHANGE UP (ref 22–31)
CO2 SERPL-SCNC: 30 MMOL/L — SIGNIFICANT CHANGE UP (ref 22–31)
CREAT SERPL-MCNC: 1.75 MG/DL — HIGH (ref 0.5–1.3)
CREAT SERPL-MCNC: 1.75 MG/DL — HIGH (ref 0.5–1.3)
EGFR: 28 ML/MIN/1.73M2 — LOW
EGFR: 28 ML/MIN/1.73M2 — LOW
GLUCOSE BLDC GLUCOMTR-MCNC: 202 MG/DL — HIGH (ref 70–99)
GLUCOSE BLDC GLUCOMTR-MCNC: 202 MG/DL — HIGH (ref 70–99)
GLUCOSE BLDC GLUCOMTR-MCNC: 213 MG/DL — HIGH (ref 70–99)
GLUCOSE BLDC GLUCOMTR-MCNC: 213 MG/DL — HIGH (ref 70–99)
GLUCOSE BLDC GLUCOMTR-MCNC: 221 MG/DL — HIGH (ref 70–99)
GLUCOSE BLDC GLUCOMTR-MCNC: 221 MG/DL — HIGH (ref 70–99)
GLUCOSE BLDC GLUCOMTR-MCNC: 79 MG/DL — SIGNIFICANT CHANGE UP (ref 70–99)
GLUCOSE BLDC GLUCOMTR-MCNC: 79 MG/DL — SIGNIFICANT CHANGE UP (ref 70–99)
GLUCOSE BLDC GLUCOMTR-MCNC: 80 MG/DL — SIGNIFICANT CHANGE UP (ref 70–99)
GLUCOSE BLDC GLUCOMTR-MCNC: 80 MG/DL — SIGNIFICANT CHANGE UP (ref 70–99)
GLUCOSE SERPL-MCNC: 192 MG/DL — HIGH (ref 70–99)
GLUCOSE SERPL-MCNC: 192 MG/DL — HIGH (ref 70–99)
HCT VFR BLD CALC: 25.2 % — LOW (ref 34.5–45)
HCT VFR BLD CALC: 25.2 % — LOW (ref 34.5–45)
HGB BLD-MCNC: 8.7 G/DL — LOW (ref 11.5–15.5)
HGB BLD-MCNC: 8.7 G/DL — LOW (ref 11.5–15.5)
MCHC RBC-ENTMCNC: 27.3 PG — SIGNIFICANT CHANGE UP (ref 27–34)
MCHC RBC-ENTMCNC: 27.3 PG — SIGNIFICANT CHANGE UP (ref 27–34)
MCHC RBC-ENTMCNC: 34.5 GM/DL — SIGNIFICANT CHANGE UP (ref 32–36)
MCHC RBC-ENTMCNC: 34.5 GM/DL — SIGNIFICANT CHANGE UP (ref 32–36)
MCV RBC AUTO: 79 FL — LOW (ref 80–100)
MCV RBC AUTO: 79 FL — LOW (ref 80–100)
NRBC # BLD: 0 /100 WBCS — SIGNIFICANT CHANGE UP (ref 0–0)
NRBC # BLD: 0 /100 WBCS — SIGNIFICANT CHANGE UP (ref 0–0)
PLATELET # BLD AUTO: 230 K/UL — SIGNIFICANT CHANGE UP (ref 150–400)
PLATELET # BLD AUTO: 230 K/UL — SIGNIFICANT CHANGE UP (ref 150–400)
POTASSIUM SERPL-MCNC: 4.4 MMOL/L — SIGNIFICANT CHANGE UP (ref 3.5–5.3)
POTASSIUM SERPL-MCNC: 4.4 MMOL/L — SIGNIFICANT CHANGE UP (ref 3.5–5.3)
POTASSIUM SERPL-SCNC: 4.4 MMOL/L — SIGNIFICANT CHANGE UP (ref 3.5–5.3)
POTASSIUM SERPL-SCNC: 4.4 MMOL/L — SIGNIFICANT CHANGE UP (ref 3.5–5.3)
PROT SERPL-MCNC: 7.3 G/DL — SIGNIFICANT CHANGE UP (ref 6–8.3)
PROT SERPL-MCNC: 7.3 G/DL — SIGNIFICANT CHANGE UP (ref 6–8.3)
RBC # BLD: 3.19 M/UL — LOW (ref 3.8–5.2)
RBC # BLD: 3.19 M/UL — LOW (ref 3.8–5.2)
RBC # FLD: 15.1 % — HIGH (ref 10.3–14.5)
RBC # FLD: 15.1 % — HIGH (ref 10.3–14.5)
SODIUM SERPL-SCNC: 136 MMOL/L — SIGNIFICANT CHANGE UP (ref 135–145)
SODIUM SERPL-SCNC: 136 MMOL/L — SIGNIFICANT CHANGE UP (ref 135–145)
WBC # BLD: 5.72 K/UL — SIGNIFICANT CHANGE UP (ref 3.8–10.5)
WBC # BLD: 5.72 K/UL — SIGNIFICANT CHANGE UP (ref 3.8–10.5)
WBC # FLD AUTO: 5.72 K/UL — SIGNIFICANT CHANGE UP (ref 3.8–10.5)
WBC # FLD AUTO: 5.72 K/UL — SIGNIFICANT CHANGE UP (ref 3.8–10.5)

## 2024-01-09 PROCEDURE — 93280 PM DEVICE PROGR EVAL DUAL: CPT | Mod: 26

## 2024-01-09 PROCEDURE — 99222 1ST HOSP IP/OBS MODERATE 55: CPT | Mod: GC

## 2024-01-09 RX ORDER — HYDROCORTISONE 1 %
1 OINTMENT (GRAM) TOPICAL
Refills: 0 | Status: DISCONTINUED | OUTPATIENT
Start: 2024-01-09 | End: 2024-01-13

## 2024-01-09 RX ORDER — LANOLIN ALCOHOL/MO/W.PET/CERES
3 CREAM (GRAM) TOPICAL ONCE
Refills: 0 | Status: COMPLETED | OUTPATIENT
Start: 2024-01-09 | End: 2024-01-09

## 2024-01-09 RX ADMIN — CHLORHEXIDINE GLUCONATE 1 APPLICATION(S): 213 SOLUTION TOPICAL at 11:17

## 2024-01-09 RX ADMIN — Medication 1 TABLET(S): at 11:19

## 2024-01-09 RX ADMIN — Medication 25 MILLIGRAM(S): at 05:25

## 2024-01-09 RX ADMIN — Medication 3 MILLIGRAM(S): at 23:36

## 2024-01-09 RX ADMIN — Medication 2: at 17:21

## 2024-01-09 RX ADMIN — Medication 325 MILLIGRAM(S): at 11:19

## 2024-01-09 RX ADMIN — Medication 650 MILLIGRAM(S): at 06:51

## 2024-01-09 RX ADMIN — INSULIN GLARGINE 10 UNIT(S): 100 INJECTION, SOLUTION SUBCUTANEOUS at 22:24

## 2024-01-09 RX ADMIN — Medication 650 MILLIGRAM(S): at 05:27

## 2024-01-09 RX ADMIN — BRIMONIDINE TARTRATE 1 DROP(S): 2 SOLUTION/ DROPS OPHTHALMIC at 11:19

## 2024-01-09 RX ADMIN — Medication 40 MILLIGRAM(S): at 05:25

## 2024-01-09 RX ADMIN — Medication 2: at 08:35

## 2024-01-09 RX ADMIN — INSULIN GLARGINE 20 UNIT(S): 100 INJECTION, SOLUTION SUBCUTANEOUS at 08:30

## 2024-01-09 RX ADMIN — ATORVASTATIN CALCIUM 40 MILLIGRAM(S): 80 TABLET, FILM COATED ORAL at 21:36

## 2024-01-09 RX ADMIN — Medication 650 MILLIGRAM(S): at 22:36

## 2024-01-09 RX ADMIN — Medication 650 MILLIGRAM(S): at 21:36

## 2024-01-09 NOTE — DISCHARGE NOTE PROVIDER - NPI NUMBER (FOR [employer] USE ONLY) :
[[id number]] [[id number]] [[id number]],[[id number]] [[id number]],[[id number]],[[id number]]

## 2024-01-09 NOTE — CONSULT NOTE ADULT - ASSESSMENT
Patient is an 83 year old Female with PMH of Bradycardia s/p PPM, AFib on Eliquis, HTN, CKD, T2DM, presenting c/o SOB on exertion, cough w/ white sputum x2 wks. Also reports L-sided swelling (arm, breast and leg).  Had DVT, US of LUE and bilateral LLE, which were negative. 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     PLAN:      - Plan to be discussed with Attending, Dr. Reyes Patient is an 83 year old Female with PMH of Bradycardia s/p PPM, AFib on Eliquis, HTN, CKD, T2DM, presenting c/o SOB on exertion, cough w/ white sputum x2 wks. Also reports L-sided swelling (arm, breast and leg).  Had DVT, US of LUE and bilateral LLE, which were negative. 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     PLAN:      - Plan to be discussed with Attending, Dr. Reyes Patient is an 83 year old Female with PMH of Bradycardia s/p PPM, AFib on Eliquis, HTN, CKD, T2DM, presenting c/o SOB on exertion, cough w/ white sputum x2 wks. Also reports L-sided swelling (arm, breast and leg).  Had DVT, US of LUE and bilateral LLE, which were negative. 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 MRI reviewed. Suggesting outpatient follow up at this time.     PLAN:    - Recommending supportive breast bra   - Encourage breast elevation*  - Care per primary for HF   - Discharge information provided in discharge. Discussed with patient to follow up outpatient for punch biopsy   - If any new changes please reconsult surgery.   - Plan discussed with Attending, Dr. Reyes    [phone]  Patient is an 83 year old Female with PMH of Bradycardia s/p PPM, AFib on Eliquis, HTN, CKD, T2DM, presenting c/o SOB on exertion, cough w/ white sputum x2 wks. Also reports L-sided swelling (arm, breast and leg).  Had DVT, US of LUE and bilateral LLE, which were negative. 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 MRI reviewed. Suggesting outpatient follow up at this time.     PLAN:    - Recommending supportive breast bra   - Encourage breast elevation*  - Care per primary for HF   - Discharge information provided in discharge. Discussed with patient to follow up outpatient for punch biopsy   - If any new changes please reconsult surgery.   - Plan discussed with Attending, Dr. Reyes    [phone]  Patient is an 83 year old Female with PMH of Bradycardia s/p PPM, AFib on Eliquis, HTN, CKD, T2DM, presenting c/o SOB on exertion, cough w/ white sputum x2 wks. Also reports L-sided swelling (arm, breast and leg).  Had DVT, US of LUE and bilateral LLE, which were negative. Surgery consulted for painful left breast with mammogram of L breast showing "left breast edema and skin thickening. inflammatory breast carcincoma is a possibility". MRI reviewed. Suggesting outpatient follow up at this time.     PLAN:    - Recommending supportive breast bra   - Encourage breast elevation*  - Care per primary for HF   - Discharge information provided in discharge. Discussed with patient to follow up outpatient for punch biopsy   - If any new changes please reconsult surgery.   - Plan discussed with Attending, Dr. Reyes    [phone]  Patient is an 83 year old Female with PMH of Bradycardia s/p PPM, AFib on Eliquis, HTN, CKD, T2DM, presenting c/o SOB on exertion, cough w/ white sputum x2 wks. Also reports L-sided swelling (arm, breast and leg).  Had DVT, US of LUE and bilateral LLE, which were negative. Surgery consulted for painful left breast with mammogram of L breast showing "left breast edema and skin thickening. inflammatory breast carcincoma is a possibility". MRI reviewed. Suggesting outpatient follow up at this time.     PLAN:    - Recommending supportive breast bra   - Encourage breast elevation*  - Care per primary for HF   - Discharge information provided in discharge. Discussed with patient to follow up outpatient for punch biopsy   - If any new changes please reconsult surgery.   - Plan discussed with Attending, Dr. Reyes    [phone]  Patient is an 83 year old Female with PMH of Bradycardia s/p PPM, AFib on Eliquis, HTN, CKD, T2DM, presenting c/o SOB on exertion, cough w/ white sputum x2 wks. Also reports L-sided swelling (arm, breast and leg).  Had DVT, US of LUE and bilateral LLE, which were negative. Surgery consulted for painful left breast edema noted clinically and on recent mammogram.   r/o inflammatory breast carcinoma.  MRI reviewed.  Suggesting outpatient follow up at this time.     PLAN:    - Recommending supportive breast bra   - Encourage breast elevation*  - Care per primary for HF   - Discharge information provided in discharge. Discussed with patient to follow up outpatient for punch biopsy   - If any new changes please reconsult surgery.   - Plan discussed with Attending, Dr. Reyes    [phone]       Attending statement:   I reviewed this case with the resident and patient's findings of left sided edema including the left breast, arm, and leg are suggestive of her fluid retention 2/2 heart failure for which she is admitted.    Patient should have left breast elevated with supportive bra and pillow support.   Sleep on back or right side.   Avoid sleeping on the left side.    We discussed that further workup and follow-up of the left breast edema will be performed as outpatient including punch biopsy if needed.    Follow up / outpatient information was provided to the patient.    ~Sylvia Reyes, MD

## 2024-01-09 NOTE — PROGRESS NOTE ADULT - SUBJECTIVE AND OBJECTIVE BOX
[facility]  Luisito Mario PA-C  [address] [place] [address]  [phone]      INTERVAL HPI/OVERNIGHT EVENTS:  pt seen and examined, daughter at bedside  occult negative  hgb stable       MEDICATIONS  (STANDING):  atorvastatin 40 milliGRAM(s) Oral at bedtime  brimonidine 0.2% Ophthalmic Solution 1 Drop(s) Both EYES daily  chlorhexidine 2% Cloths 1 Application(s) Topical daily  dextrose 5%. 1000 milliLiter(s) (100 mL/Hr) IV Continuous <Continuous>  dextrose 5%. 1000 milliLiter(s) (50 mL/Hr) IV Continuous <Continuous>  dextrose 50% Injectable 25 Gram(s) IV Push once  dextrose 50% Injectable 12.5 Gram(s) IV Push once  dextrose 50% Injectable 25 Gram(s) IV Push once  ferrous    sulfate 325 milliGRAM(s) Oral daily  furosemide   Injectable 40 milliGRAM(s) IV Push daily  glucagon  Injectable 1 milliGRAM(s) IntraMuscular once  insulin glargine Injectable (LANTUS) 10 Unit(s) SubCutaneous at bedtime  insulin glargine Injectable (LANTUS) 20 Unit(s) SubCutaneous every morning  insulin lispro (ADMELOG) corrective regimen sliding scale   SubCutaneous three times a day before meals  insulin lispro (ADMELOG) corrective regimen sliding scale   SubCutaneous at bedtime  metoprolol succinate ER 25 milliGRAM(s) Oral daily  Nephro-patsy 1 Tablet(s) Oral daily    MEDICATIONS  (PRN):  acetaminophen     Tablet .. 650 milliGRAM(s) Oral every 6 hours PRN Temp greater or equal to 38.5C (101.3F), Mild Pain (1 - 3)  albuterol    90 MICROgram(s) HFA Inhaler 2 Puff(s) Inhalation every 6 hours PRN Shortness of Breath and/or Wheezing  benzonatate 100 milliGRAM(s) Oral every 8 hours PRN Cough  dextrose Oral Gel 15 Gram(s) Oral once PRN Blood Glucose LESS THAN 70 milliGRAM(s)/deciliter  guaiFENesin Oral Liquid (Sugar-Free) 100 milliGRAM(s) Oral every 6 hours PRN Cough      Allergies    lisinopril (Hives)  penicillin (Unknown)    Intolerances        ROS:   General:  No wt loss, fevers, chills, night sweats, fatigue,   Eyes:  Good vision, no reported pain  ENT:  No sore throat, pain, runny nose, dysphagia  CV:  No pain, palpitations, hypo/hypertension  Resp:  No dyspnea, cough, tachypnea, wheezing  GI:  No pain, No nausea, No vomiting, No diarrhea, No constipation, No weight loss, No fever, No pruritis, No rectal bleeding, No tarry stools, No dysphagia,  :  No pain, bleeding, incontinence, nocturia  Muscle:  No pain, weakness  Neuro:  No weakness, tingling, memory problems  Psych:  No fatigue, insomnia, mood problems, depression  Endocrine:  No polyuria, polydipsia, cold/heat intolerance  Heme:  No petechiae, ecchymosis, easy bruisability  Skin:  No rash, tattoos, scars, edema      PHYSICAL EXAM:   Vital Signs:  Vital Signs Last 24 Hrs  T(C): 36.7 (2024 04:44), Max: 36.9 (2024 20:11)  T(F): 98.1 (2024 04:44), Max: 98.5 (2024 20:11)  HR: 70 (2024 04:44) (69 - 70)  BP: 131/62 (2024 04:44) (121/74 - 131/62)  BP(mean): --  RR: 18 (2024 04:44) (18 - 18)  SpO2: 96% (2024 04:44) (96% - 98%)    Parameters below as of 2024 04:44  Patient On (Oxygen Delivery Method): room air      Daily     Daily Weight in k.5 (2024 08:19)    GENERAL:  Appears stated age,   HEENT:  NC/AT,    CHEST:  Full & symmetric excursion,   HEART:  Regular rhythm,  ABDOMEN:  Soft, non-tender, non-distended,  EXTEREMITIES:  no cyanosis  SKIN:  No rash  NEURO:  Alert,       LABS:                        8.7    5.72  )-----------( 230      ( 2024 06:29 )             25.2     -    136  |  98  |  28<H>  ----------------------------<  192<H>  4.4   |  30  |  1.75<H>    Ca    8.9      2024 06:29    TPro  7.3  /  Alb  3.5  /  TBili  0.4  /  DBili  x   /  AST  38  /  ALT  26  /  AlkPhos  157<H>        Urinalysis Basic - ( 2024 06:29 )    Color: x / Appearance: x / SG: x / pH: x  Gluc: 192 mg/dL / Ketone: x  / Bili: x / Urobili: x   Blood: x / Protein: x / Nitrite: x   Leuk Esterase: x / RBC: x / WBC x   Sq Epi: x / Non Sq Epi: x / Bacteria: x        RADIOLOGY & ADDITIONAL TESTS:   [facility]  Luisito Mario PA-C  [address] [place] [address]  [phone]      INTERVAL HPI/OVERNIGHT EVENTS:  pt seen and examined, daughter at bedside  occult negative  hgb stable       MEDICATIONS  (STANDING):  atorvastatin 40 milliGRAM(s) Oral at bedtime  brimonidine 0.2% Ophthalmic Solution 1 Drop(s) Both EYES daily  chlorhexidine 2% Cloths 1 Application(s) Topical daily  dextrose 5%. 1000 milliLiter(s) (100 mL/Hr) IV Continuous <Continuous>  dextrose 5%. 1000 milliLiter(s) (50 mL/Hr) IV Continuous <Continuous>  dextrose 50% Injectable 25 Gram(s) IV Push once  dextrose 50% Injectable 12.5 Gram(s) IV Push once  dextrose 50% Injectable 25 Gram(s) IV Push once  ferrous    sulfate 325 milliGRAM(s) Oral daily  furosemide   Injectable 40 milliGRAM(s) IV Push daily  glucagon  Injectable 1 milliGRAM(s) IntraMuscular once  insulin glargine Injectable (LANTUS) 10 Unit(s) SubCutaneous at bedtime  insulin glargine Injectable (LANTUS) 20 Unit(s) SubCutaneous every morning  insulin lispro (ADMELOG) corrective regimen sliding scale   SubCutaneous three times a day before meals  insulin lispro (ADMELOG) corrective regimen sliding scale   SubCutaneous at bedtime  metoprolol succinate ER 25 milliGRAM(s) Oral daily  Nephro-patsy 1 Tablet(s) Oral daily    MEDICATIONS  (PRN):  acetaminophen     Tablet .. 650 milliGRAM(s) Oral every 6 hours PRN Temp greater or equal to 38.5C (101.3F), Mild Pain (1 - 3)  albuterol    90 MICROgram(s) HFA Inhaler 2 Puff(s) Inhalation every 6 hours PRN Shortness of Breath and/or Wheezing  benzonatate 100 milliGRAM(s) Oral every 8 hours PRN Cough  dextrose Oral Gel 15 Gram(s) Oral once PRN Blood Glucose LESS THAN 70 milliGRAM(s)/deciliter  guaiFENesin Oral Liquid (Sugar-Free) 100 milliGRAM(s) Oral every 6 hours PRN Cough      Allergies    lisinopril (Hives)  penicillin (Unknown)    Intolerances        ROS:   General:  No wt loss, fevers, chills, night sweats, fatigue,   Eyes:  Good vision, no reported pain  ENT:  No sore throat, pain, runny nose, dysphagia  CV:  No pain, palpitations, hypo/hypertension  Resp:  No dyspnea, cough, tachypnea, wheezing  GI:  No pain, No nausea, No vomiting, No diarrhea, No constipation, No weight loss, No fever, No pruritis, No rectal bleeding, No tarry stools, No dysphagia,  :  No pain, bleeding, incontinence, nocturia  Muscle:  No pain, weakness  Neuro:  No weakness, tingling, memory problems  Psych:  No fatigue, insomnia, mood problems, depression  Endocrine:  No polyuria, polydipsia, cold/heat intolerance  Heme:  No petechiae, ecchymosis, easy bruisability  Skin:  No rash, tattoos, scars, edema      PHYSICAL EXAM:   Vital Signs:  Vital Signs Last 24 Hrs  T(C): 36.7 (2024 04:44), Max: 36.9 (2024 20:11)  T(F): 98.1 (2024 04:44), Max: 98.5 (2024 20:11)  HR: 70 (2024 04:44) (69 - 70)  BP: 131/62 (2024 04:44) (121/74 - 131/62)  BP(mean): --  RR: 18 (2024 04:44) (18 - 18)  SpO2: 96% (2024 04:44) (96% - 98%)    Parameters below as of 2024 04:44  Patient On (Oxygen Delivery Method): room air      Daily     Daily Weight in k.5 (2024 08:19)    GENERAL:  Appears stated age,   HEENT:  NC/AT,    CHEST:  Full & symmetric excursion,   HEART:  Regular rhythm,  ABDOMEN:  Soft, non-tender, non-distended,  EXTEREMITIES:  no cyanosis  SKIN:  No rash  NEURO:  Alert,       LABS:                        8.7    5.72  )-----------( 230      ( 2024 06:29 )             25.2     -    136  |  98  |  28<H>  ----------------------------<  192<H>  4.4   |  30  |  1.75<H>    Ca    8.9      2024 06:29    TPro  7.3  /  Alb  3.5  /  TBili  0.4  /  DBili  x   /  AST  38  /  ALT  26  /  AlkPhos  157<H>        Urinalysis Basic - ( 2024 06:29 )    Color: x / Appearance: x / SG: x / pH: x  Gluc: 192 mg/dL / Ketone: x  / Bili: x / Urobili: x   Blood: x / Protein: x / Nitrite: x   Leuk Esterase: x / RBC: x / WBC x   Sq Epi: x / Non Sq Epi: x / Bacteria: x        RADIOLOGY & ADDITIONAL TESTS:

## 2024-01-09 NOTE — DISCHARGE NOTE PROVIDER - PROVIDER TOKENS
PROVIDER:[TOKEN:[[id number]],FOLLOWUP:[1 week]] PROVIDER:[TOKEN:[[id number]],FOLLOWUP:[1 week]] PROVIDER:[TOKEN:[[id number]],FOLLOWUP:[1 week]],PROVIDER:[TOKEN:[[id number]],FOLLOWUP:[1-3 days],ESTABLISHEDPATIENT:[T]],PROVIDER:[[id number]],FOLLOWUP:[1 week],ESTABLISHEDPATIENT:[T]] PROVIDER:[TOKEN:[[id number]],FOLLOWUP:[1 week]],PROVIDER:[TOKEN:[[id number]],FOLLOWUP:[1-3 days],ESTABLISHEDPATIENT:[T]],PROVIDER:[[id number]],FOLLOWUP:[1 week],ESTABLISHEDPATIENT:[T]]

## 2024-01-09 NOTE — PROGRESS NOTE ADULT - SUBJECTIVE AND OBJECTIVE BOX
Overnight events noted      VITAL:  T(C): , Max: 36.9 (01-08-24 @ 20:11)  T(F): , Max: 98.5 (01-08-24 @ 20:11)  HR: 70 (01-09-24 @ 04:44)  BP: 131/62 (01-09-24 @ 04:44)  BP(mean): --  RR: 18 (01-09-24 @ 04:44)  SpO2: 96% (01-09-24 @ 04:44)  Wt(kg): --      PHYSICAL EXAM:  Constitutional: Obese, lethargic, mildly confused  HEENT: NCAT, DMM  Neck: Supple, No JVD  Respiratory: CTA-b/l  Cardiovascular: RRR s1s2, no m/r/g  Gastrointestinal: BS+, soft, NT/ND  Extremities: (+)nonpitting edema, L>R  Neurological: no focal deficits; strength grossly intact  Back: no CVAT b/l  Skin: No rashes, no nevi    LABS:                        8.7    5.72  )-----------( 230      ( 09 Jan 2024 06:29 )             25.2     Na(136)/K(4.4)/Cl(98)/HCO3(30)/BUN(28)/Cr(1.75)Glu(192)/Ca(8.9)/Mg(--)/PO4(--)    01-09 @ 06:29  Na(140)/K(4.4)/Cl(99)/HCO3(30)/BUN(31)/Cr(2.12)Glu(100)/Ca(9.0)/Mg(--)/PO4(--)    01-08 @ 07:24  Na(139)/K(4.5)/Cl(99)/HCO3(29)/BUN(30)/Cr(2.25)Glu(119)/Ca(8.6)/Mg(--)/PO4(4.9)    01-07 @ 07:31      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, 1/5/24 p/w SOB/L-sided swelling    (1)Renal - nonproteinuric CKD3b - likely due to hypertension/microvascular disease; prerenally mediated fluctuations in creatinine since admission    (2)Lytes - acceptable    (3)CV - L-sided swelling - likely primarily due to distributive changes associated with inflammatory breast CA; likely also a component of R-sided CHF in association with interstitial lung disease/cor pulmonale    (4)Surgery - concern for inflammatory L breast CA - awaiting Surgery input    RECOMMEND:  (1)Lasix 40mg IV daily as ordered for now  (2)Dose new meds for GFR 20-30ml/min  (3)F/U Surgery input given concern for breast CA        Yoel Suarez MD  [facility]  Office/on call physician: [phone]  Cell (7a-7p): [phone]       Overnight events noted      VITAL:  T(C): , Max: 36.9 (01-08-24 @ 20:11)  T(F): , Max: 98.5 (01-08-24 @ 20:11)  HR: 70 (01-09-24 @ 04:44)  BP: 131/62 (01-09-24 @ 04:44)  BP(mean): --  RR: 18 (01-09-24 @ 04:44)  SpO2: 96% (01-09-24 @ 04:44)  Wt(kg): --      PHYSICAL EXAM:  Constitutional: Obese, lethargic, mildly confused  HEENT: NCAT, DMM  Neck: Supple, No JVD  Respiratory: CTA-b/l  Cardiovascular: RRR s1s2, no m/r/g  Gastrointestinal: BS+, soft, NT/ND  Extremities: (+)nonpitting edema, L>R  Neurological: no focal deficits; strength grossly intact  Back: no CVAT b/l  Skin: No rashes, no nevi    LABS:                        8.7    5.72  )-----------( 230      ( 09 Jan 2024 06:29 )             25.2     Na(136)/K(4.4)/Cl(98)/HCO3(30)/BUN(28)/Cr(1.75)Glu(192)/Ca(8.9)/Mg(--)/PO4(--)    01-09 @ 06:29  Na(140)/K(4.4)/Cl(99)/HCO3(30)/BUN(31)/Cr(2.12)Glu(100)/Ca(9.0)/Mg(--)/PO4(--)    01-08 @ 07:24  Na(139)/K(4.5)/Cl(99)/HCO3(29)/BUN(30)/Cr(2.25)Glu(119)/Ca(8.6)/Mg(--)/PO4(4.9)    01-07 @ 07:31      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, 1/5/24 p/w SOB/L-sided swelling    (1)Renal - nonproteinuric CKD3b - likely due to hypertension/microvascular disease; prerenally mediated fluctuations in creatinine since admission    (2)Lytes - acceptable    (3)CV - L-sided swelling - likely primarily due to distributive changes associated with inflammatory breast CA; likely also a component of R-sided CHF in association with interstitial lung disease/cor pulmonale    (4)Surgery - concern for inflammatory L breast CA - awaiting Surgery input    RECOMMEND:  (1)Lasix 40mg IV daily as ordered for now  (2)Dose new meds for GFR 20-30ml/min  (3)F/U Surgery input given concern for breast CA        Yoel Suarez MD  [facility]  Office/on call physician: [phone]  Cell (7a-7p): [phone]       No pain  L-sided swelling improving  daughter at bedside      VITAL:  T(C): , Max: 36.9 (01-08-24 @ 20:11)  T(F): , Max: 98.5 (01-08-24 @ 20:11)  HR: 70 (01-09-24 @ 04:44)  BP: 131/62 (01-09-24 @ 04:44)  BP(mean): --  RR: 18 (01-09-24 @ 04:44)  SpO2: 96% (01-09-24 @ 04:44)  Wt(kg): --      PHYSICAL EXAM:  Constitutional: Obese, lethargic, mildly confused  HEENT: NCAT, DMM  Neck: Supple, No JVD  Respiratory: CTA-b/l  Cardiovascular: RRR s1s2, no m/r/g  Gastrointestinal: BS+, soft, NT/ND  Extremities: (+)nonpitting edema, L>R  Neurological: no focal deficits; strength grossly intact  Back: no CVAT b/l  Skin: No rashes, no nevi    LABS:                        8.7    5.72  )-----------( 230      ( 09 Jan 2024 06:29 )             25.2     Na(136)/K(4.4)/Cl(98)/HCO3(30)/BUN(28)/Cr(1.75)Glu(192)/Ca(8.9)/Mg(--)/PO4(--)    01-09 @ 06:29  Na(140)/K(4.4)/Cl(99)/HCO3(30)/BUN(31)/Cr(2.12)Glu(100)/Ca(9.0)/Mg(--)/PO4(--)    01-08 @ 07:24  Na(139)/K(4.5)/Cl(99)/HCO3(29)/BUN(30)/Cr(2.25)Glu(119)/Ca(8.6)/Mg(--)/PO4(4.9)    01-07 @ 07:31      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, 1/5/24 p/w SOB/L-sided swelling    (1)Renal - nonproteinuric CKD3b - likely due to hypertension/microvascular disease; prerenally mediated fluctuations in creatinine since admission    (2)Lytes - acceptable    (3)CV - L-sided swelling - likely primarily due to distributive changes associated with inflammatory breast CA; likely also a component of R-sided CHF in association with interstitial lung disease/cor pulmonale    (4)Surgery - concern for inflammatory L breast CA - awaiting Surgery input    RECOMMEND:  (1)Lasix 40mg IV daily as ordered for now  (2)Dose new meds for GFR 20-30ml/min  (3)F/U Surgery input given concern for breast CA        Yoel Suarez MD  [facility]  Office/on call physician: [phone]  Cell (7a-7p): [phone]       No pain  L-sided swelling improving  daughter at bedside      VITAL:  T(C): , Max: 36.9 (01-08-24 @ 20:11)  T(F): , Max: 98.5 (01-08-24 @ 20:11)  HR: 70 (01-09-24 @ 04:44)  BP: 131/62 (01-09-24 @ 04:44)  BP(mean): --  RR: 18 (01-09-24 @ 04:44)  SpO2: 96% (01-09-24 @ 04:44)  Wt(kg): --      PHYSICAL EXAM:  Constitutional: Obese, lethargic, mildly confused  HEENT: NCAT, DMM  Neck: Supple, No JVD  Respiratory: CTA-b/l  Cardiovascular: RRR s1s2, no m/r/g  Gastrointestinal: BS+, soft, NT/ND  Extremities: (+)nonpitting edema, L>R  Neurological: no focal deficits; strength grossly intact  Back: no CVAT b/l  Skin: No rashes, no nevi    LABS:                        8.7    5.72  )-----------( 230      ( 09 Jan 2024 06:29 )             25.2     Na(136)/K(4.4)/Cl(98)/HCO3(30)/BUN(28)/Cr(1.75)Glu(192)/Ca(8.9)/Mg(--)/PO4(--)    01-09 @ 06:29  Na(140)/K(4.4)/Cl(99)/HCO3(30)/BUN(31)/Cr(2.12)Glu(100)/Ca(9.0)/Mg(--)/PO4(--)    01-08 @ 07:24  Na(139)/K(4.5)/Cl(99)/HCO3(29)/BUN(30)/Cr(2.25)Glu(119)/Ca(8.6)/Mg(--)/PO4(4.9)    01-07 @ 07:31      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, 1/5/24 p/w SOB/L-sided swelling    (1)Renal - nonproteinuric CKD3b - likely due to hypertension/microvascular disease; prerenally mediated fluctuations in creatinine since admission    (2)Lytes - acceptable    (3)CV - L-sided swelling - likely primarily due to distributive changes associated with inflammatory breast CA; likely also a component of R-sided CHF in association with interstitial lung disease/cor pulmonale    (4)Surgery - concern for inflammatory L breast CA - awaiting Surgery input    RECOMMEND:  (1)Lasix 40mg IV daily as ordered for now  (2)Dose new meds for GFR 20-30ml/min  (3)F/U Surgery input given concern for breast CA        Yoel Suarez MD  [facility]  Office/on call physician: [phone]  Cell (7a-7p): [phone]

## 2024-01-09 NOTE — PROGRESS NOTE ADULT - SUBJECTIVE AND OBJECTIVE BOX
Patient is a 83y old  Female who presents with a chief complaint of Heart failure    Per chart, pt is an 83 year old Female with PMH of Bradycardia s/p PPM, AFib on Eliquis, HTN, CKD, T2DM, presenting c/o SOB on exertion, cough w/ white sputum x2 wks. Also reports L-sided swelling (arm, breast and leg).  Had DVT, US of LUE and bilateral LLE, which were negative. 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    (08 Jan 2024 11:18)      SUBJECTIVE / OVERNIGHT EVENTS: c/o L breast tenderness   Review of Systems  chest pain no  palpitations no  sob no  nausea no  headache no    MEDICATIONS  (STANDING):  atorvastatin 40 milliGRAM(s) Oral at bedtime  brimonidine 0.2% Ophthalmic Solution 1 Drop(s) Both EYES daily  chlorhexidine 2% Cloths 1 Application(s) Topical daily  dextrose 5%. 1000 milliLiter(s) (100 mL/Hr) IV Continuous <Continuous>  dextrose 5%. 1000 milliLiter(s) (50 mL/Hr) IV Continuous <Continuous>  dextrose 50% Injectable 25 Gram(s) IV Push once  dextrose 50% Injectable 12.5 Gram(s) IV Push once  dextrose 50% Injectable 25 Gram(s) IV Push once  ferrous    sulfate 325 milliGRAM(s) Oral daily  furosemide   Injectable 40 milliGRAM(s) IV Push daily  glucagon  Injectable 1 milliGRAM(s) IntraMuscular once  insulin glargine Injectable (LANTUS) 10 Unit(s) SubCutaneous at bedtime  insulin glargine Injectable (LANTUS) 20 Unit(s) SubCutaneous every morning  insulin lispro (ADMELOG) corrective regimen sliding scale   SubCutaneous three times a day before meals  insulin lispro (ADMELOG) corrective regimen sliding scale   SubCutaneous at bedtime  metoprolol succinate ER 25 milliGRAM(s) Oral daily  Nephro-patsy 1 Tablet(s) Oral daily    MEDICATIONS  (PRN):  acetaminophen     Tablet .. 650 milliGRAM(s) Oral every 6 hours PRN Temp greater or equal to 38.5C (101.3F), Mild Pain (1 - 3)  albuterol    90 MICROgram(s) HFA Inhaler 2 Puff(s) Inhalation every 6 hours PRN Shortness of Breath and/or Wheezing  benzonatate 100 milliGRAM(s) Oral every 8 hours PRN Cough  dextrose Oral Gel 15 Gram(s) Oral once PRN Blood Glucose LESS THAN 70 milliGRAM(s)/deciliter  guaiFENesin Oral Liquid (Sugar-Free) 100 milliGRAM(s) Oral every 6 hours PRN Cough      Vital Signs Last 24 Hrs  T(C): 36.7 (09 Jan 2024 11:45), Max: 36.9 (08 Jan 2024 20:11)  T(F): 98 (09 Jan 2024 11:45), Max: 98.5 (08 Jan 2024 20:11)  HR: 70 (09 Jan 2024 11:45) (69 - 70)  BP: 123/75 (09 Jan 2024 11:45) (121/74 - 131/62)  BP(mean): --  RR: 18 (09 Jan 2024 11:45) (18 - 18)  SpO2: 97% (09 Jan 2024 11:45) (96% - 98%)    Parameters below as of 09 Jan 2024 11:45  Patient On (Oxygen Delivery Method): room air        PHYSICAL EXAM:  GENERAL: NAD, well-developed  HEAD:  Atraumatic, Normocephalic  EYES: EOMI, PERRLA, conjunctiva and sclera clear  NECK: Supple, No JVD  CHEST/LUNG: Clear to auscultation bilaterally; No wheeze L breast erythema   HEART: Regular rate and rhythm; No murmurs, rubs, or gallops  ABDOMEN: Soft, Nontender, Nondistended; Bowel sounds present  EXTREMITIES:  2+bipedal edema  PSYCH: AAOx3  NEUROLOGY: non-focal  SKIN: No rashes or lesions    LABS:                        8.7    5.72  )-----------( 230      ( 09 Jan 2024 06:29 )             25.2     01-09    136  |  98  |  28<H>  ----------------------------<  192<H>  4.4   |  30  |  1.75<H>    Ca    8.9      09 Jan 2024 06:29    TPro  7.3  /  Alb  3.5  /  TBili  0.4  /  DBili  x   /  AST  38  /  ALT  26  /  AlkPhos  157<H>  01-09          Urinalysis Basic - ( 09 Jan 2024 06:29 )    Color: x / Appearance: x / SG: x / pH: x  Gluc: 192 mg/dL / Ketone: x  / Bili: x / Urobili: x   Blood: x / Protein: x / Nitrite: x   Leuk Esterase: x / RBC: x / WBC x   Sq Epi: x / Non Sq Epi: x / Bacteria: x      < from: TTE W or WO Ultrasound Enhancing Agent (01.06.24 @ 10:06) >     CONCLUSIONS:      1. Left ventricular cavity is normal. Left ventricular wall thickness is normal. Left ventricular systolic function is normal. There are no regional wallmotion abnormalities seen.   2. Mildly enlarged right ventricular cavity size and probably normal systolic function.   3. Severe tricuspid regurgitation.   4. Device lead is visualized in the right heart.    < end of copied text >      RADIOLOGY & ADDITIONAL TESTS:    Imaging Personally Reviewed:    Consultant(s) Notes Reviewed:      Care Discussed with Consultants/Other Providers:

## 2024-01-09 NOTE — DISCHARGE NOTE PROVIDER - NSDCMRMEDTOKEN_GEN_ALL_CORE_FT
albuterol 90 mcg/inh inhalation aerosol: 2 puff(s) inhaled every 6 hours, As needed, Wheezing  atorvastatin 40 mg oral tablet: 1 tab(s) orally once a day  brimonidine 0.2% ophthalmic solution: 1 drop(s) in each affected eye once a day  dorzolamide-timolol 2.23%-0.68% ophthalmic solution: 1 drop(s) to both eyes once daily  Eliquis 5 mg oral tablet: 1 tab(s) orally 2 times a day  HumuLIN 70/30 KwikPen 70 units-30 units/mL subcutaneous suspension: 20 units in AM and 10 units in PM  hydrALAZINE 25 mg oral tablet: 1 tab(s) orally 2 times a day  icosapent 1 g oral capsule: 2 cap(s) orally 2 times a day  Januvia 50 mg oral tablet: 1 tab(s) orally once a day  Jardiance 10 mg oral tablet: 1 tab(s) orally once a day  Kerendia 10 mg oral tablet: 1 tab(s) orally once a day  Lasix 40 mg oral tablet: 1 tab(s) orally once a day   acetaminophen 325 mg oral tablet: 2 tab(s) orally every 6 hours As needed Temp greater or equal to 38.5C (101.3F), Mild Pain (1 - 3)  albuterol 90 mcg/inh inhalation aerosol: 2 puff(s) inhaled every 6 hours, As needed, Wheezing  amiodarone 200 mg oral tablet: 1 tab(s) orally once a day  amiodarone 400 mg oral tablet: 1 tab(s) orally every 8 hours Stop after 9 doses, then start amiodarone 200 mg daily  apixaban 2.5 mg oral tablet: 1 tab(s) orally 2 times a day  atorvastatin 40 mg oral tablet: 1 tab(s) orally once a day  brimonidine 0.2% ophthalmic solution: 1 drop(s) in each affected eye once a day  dorzolamide-timolol 2.23%-0.68% ophthalmic solution: 1 drop(s) to both eyes once daily  ferrous sulfate 325 mg (65 mg elemental iron) oral tablet: 1 tab(s) orally once a day  guaiFENesin 100 mg/5 mL oral liquid: 5 milliliter(s) orally every 6 hours As needed Cough  HumuLIN 70/30 KwikPen 70 units-30 units/mL subcutaneous suspension: 20 units in AM and 10 units in PM  hydrocortisone 2.5% topical ointment: Apply topically to affected area 2 times a day 1 Apply topically to affected area 2 times a day  icosapent 1 g oral capsule: 2 cap(s) orally 2 times a day  Januvia 50 mg oral tablet: 1 tab(s) orally once a day  Jardiance 10 mg oral tablet: 1 tab(s) orally once a day  Lasix 40 mg oral tablet: 1 tab(s) orally once a day  metoprolol succinate 25 mg oral tablet, extended release: 1 tab(s) orally once a day  Nephro-Samira oral tablet: 1 tab(s) orally once a day

## 2024-01-09 NOTE — DISCHARGE NOTE PROVIDER - [email or web address]_GEN_ALL_CORE_FT
Please elevate your left breast  and follow up outpatient.  APPTS ARE READY TO BE MADE: [ ] YES    Best Family or Patient Contact (if needed):    Additional Information about above appointments (if needed):    1:   2:   3:     Other comments or requests:    APPTS ARE READY TO BE MADE: [x] YES    Best Family or Patient Contact (if needed):    Additional Information about above appointments (if needed):    1:   2:   3:     Other comments or requests:

## 2024-01-09 NOTE — PROGRESS NOTE ADULT - ASSESSMENT
iron deficiency anemia   congestive heart failure   CKD    cbc daily   no signs of GI bleeding   occult negative   diuresis per cardiology   US of breast unable to be done here; surgery consulted  no plans for EGD/colonoscopy at this time  will follow   d/w pt and daughter at bedside    I reviewed the overnight course of events on the unit, re-confirming the patient history. I discussed the care with the patient and their family  The plan of care was discussed with the physician assistant and modifications were made to the notation where appropriate.   Differential diagnosis and plan of care discussed with patient after the evaluation  50 minutes spent on total encounter of which more than fifty percent of the encounter was spent counseling and/or coordinating care by the attending physician.  Advanced care planning was discussed with patient and family.  Advanced care planning forms were reviewed and discussed.  Risks, benefits and alternatives of gastroenterologic procedures were discussed in detail and all questions were answered.

## 2024-01-09 NOTE — PROCEDURE NOTE - ADDITIONAL PROCEDURE DETAILS
1) Indication for interrogation: HF exacerbation   2) Presenting rhythm: ATach with ventricular pacing in the 70's  3) Measured data WNL, normal pacemaker function, Pt is not pacemaker dependent  4) Since 3/9/2021: A pace 9%, V pace 100%, AT/AF burden 18%  5) Since last remote transmission from 1/5/2024, stored data revealed patient has been in persistent atrial tachycardia with controlled ventricular rate since 1/5/2024. Pt has been off DOAC since 1/6/24 due to iron deficiency anemia per documentation.   6) Note that atrial output is currently programmed at 3.5V @ 0.5ms, unable to check atrial pacing thresholds today due to pt is currently in atrial tachycardia.   7) Follow up closely with Dr. DARCY Lora.     DARCY Sexton, NP-C  [id number] 1) Indication for interrogation: HF exacerbation   2) Presenting rhythm: ATach with ventricular pacing in the 70's  3) Measured data WNL, normal pacemaker function, Pt is not pacemaker dependent  4) Since 3/9/2021: A pace 9%, V pace 100%, AT/AF burden 18%  5) Since last remote transmission from 1/5/2024, stored data revealed patient has been in persistent atrial tachycardia with controlled ventricular rate since 1/5/2024. Pt has been off DOAC since 1/6/24 due to iron deficiency anemia per documentation.   6) Note that atrial output is currently programmed at 3.5V @ 0.5ms, unable to check atrial pacing thresholds today due to pt is currently in atrial tachycardia.   7) Follow up closely with Dr. DARCY Lora.     DARCY Sexton, NP-C  [id number]

## 2024-01-09 NOTE — CONSULT NOTE ADULT - ASSESSMENT
L breast edema  - patient and daughter at bedside report outpatient imagine may have been suggestive of an inflammatory breast cancer  - given the absence of cutaneous changes, a skin biopsy would be low yield and unlikely to reveal the underlying cause of the L breast swelling or help characterize any potential malignancy.   - Discussed with the patient and the patients daughter at bedside that a skin biopsy would also typically take 1-2 weeks to result.   - The patient and the patient's daughter prefer to hold off on a skin biopsy at this time given the above.   - Discussed with the primary team     Mild intertrigo, flaring, under L breast.   - recommend applying hydrocortisone 2.5% ointment to the affected area under the breast BID For up to 2 weeks then stop.       Patient was seen at bedside with the dermatology attending Dr. Pozo.   Recommendations were communicated with the primary team.    Dermatology team to sign off. Please reconsult with any new concerns.     Please page [phone] for further related questions.    Fernanda Huntley MD  Resident Physician, PGY3  [facility]  Pager: [phone]   L breast edema  - patient and daughter at bedside report outpatient imagine may have been suggestive of an inflammatory breast cancer  - given the absence of cutaneous changes, a skin biopsy would be low yield and unlikely to reveal the underlying cause of the L breast swelling or help characterize any potential malignancy.   - Discussed with the patient and the patients daughter at bedside that a skin biopsy would also typically take 1-2 weeks to result.   - The patient and the patient's daughter prefer to hold off on a skin biopsy at this time given the above.   - Discussed with the primary team     Mild intertrigo, flaring, under L breast.   - recommend applying hydrocortisone 2.5% ointment to the affected area under the breast BID For up to 2 weeks then stop.       Patient was seen at bedside with the dermatology attending Dr. Pozo.   Recommendations were communicated with the primary team.    Dermatology team to sign off. Please reconsult with any new concerns.     Please page [phone] for further related questions.    Fernanda Huntley MD  Resident Physician, PGY3  [facility] Dermatology  Pager: [phone]

## 2024-01-09 NOTE — DISCHARGE NOTE PROVIDER - NSDCCPTREATMENT_GEN_ALL_CORE_FT
PRINCIPAL PROCEDURE  Procedure: Duplex scan of carotid artery  Findings and Treatment:   No hemodynamically significant right internal carotid artery stenosis.  Velocity doubling of the proximal left ICA compared to distal CCA,   associated with mixed plaque at the left bulb, suggests approximately 50%   stenosis.  Waveforms demonstrate arrhythmia, which may affect velocity measurements.  Measurement of carotid stenosis is based on velocity parameters that   correlate the residual internal carotid diameter with that of the more   distal vessel in accordance with a method such as the [place] American   Symptomatic Carotid Endarterectomy Trial (NASCET).

## 2024-01-09 NOTE — DISCHARGE NOTE PROVIDER - HOSPITAL COURSE
HPI:  83F hx of bradycardia s/p PPM presenting with 2 weeks of dyspnea worse with exertion, cough with white sputum, intermittent substernal CP that arises from exertion. Also reports L-sided swelling (arm, breast and leg). Had DVT ultrasounds of LUE and bilateral LLE, which were negative. 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  (05 Jan 2024 19:26)    Hospital Course: ****      Important Medication Changes and Reason: ****      Active or Pending Issues Requiring Follow-up:  PCP, EP, surgery    Advanced Directives:   [x ] Full code  [ ] DNR  [ ] Hospice    Discharge Diagnoses:  CHF  Afib  Abnormal mammogram - c/f inflammatory carcinoma  Mastitis  Anemia  HTN  T2DM       HPI:  83F hx of bradycardia s/p PPM presenting with 2 weeks of dyspnea worse with exertion, cough with white sputum, intermittent substernal CP that arises from exertion. Also reports L-sided swelling (arm, breast and leg). Had DVT ultrasounds of LUE and bilateral LLE, which were negative. 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  (05 Jan 2024 19:26)    Hospital Course:   83 f with  HTN HLD mild dementia, known cardiomyopathy, baseline creainine unknown , PPM with short bursts PAF, metoprolol and diuretics had been stopped by another provider, possible interstitial lung disease,  with dyspnea, elevated BNP, elevated creatiine, bilateral LCE edema nad left arm /left breast edema. ECHO LV fx preserved.  found iron deficient anemia. Having sustained atrial tachycardia    Acute on Chronic Diastolic  Congestive Heart Failure  - lasix restarted by nephrology  - BP was borderline, thus hydralazine was held, IF BP elevated ,would restart hydralazine 10 TID  - f u creatinine    SSS/ PAF  - restart eliquis low dose 2.5 BID, watch HB  - amiodarone 400 TID , then 200 mg daily     Breast swelling  -no evidence of malignancy    Anemia  - consider iron transfusion,   - anticoagulation stopped    - hold hydralazine    Patient medically cleared for discharge and to follow up outpatient with PCP, EP, surgery.    Advanced Directives:   [x ] Full code  [ ] DNR  [ ] Hospice    Discharge Diagnoses:  CHF  Afib  Abnormal mammogram - c/f inflammatory carcinoma  Mastitis  Anemia  HTN  T2DM

## 2024-01-09 NOTE — DISCHARGE NOTE PROVIDER - CARE PROVIDER_API CALL
Reyes, Sylvia Alicia  Surgical Oncology  [address] [facility], [place] [address]  Phone: [phone]  Fax: [phone]  Follow Up Time: 1 week   Reyes, Sylvia Alicia  Surgical Oncology  [address] [facility], [place] [address]  Phone: [phone]  Fax: [phone]  Follow Up Time: 1 week   Reyes, Sylvia Alicia  Surgical Oncology  [address] [facility], [place] [address]  Phone: [phone]  Fax: [phone]  Follow Up Time: 1 week    MARGARITA RODAS  [address] [place] [address]  Phone: ()-  Fax: ()-  Established Patient  Follow Up Time: 1-3 days    Syed Lora  Cardiac Electrophysiology  [address] [facility] [address]  [place] [address]  Phone: [phone]  Fax: [phone]  Established Patient  Follow Up Time: 1 week   Reyes, Sylvia Alicia  Surgical Oncology  [address] [facility], [place] [address]  Phone: [phone]  Fax: [phone]  Follow Up Time: 1 week    MARGARITA RODAS  [address] [place] [id number]  Phone: ()-  Fax: ()-  Established Patient  Follow Up Time: 1-3 days    Syed Lora  Cardiac Electrophysiology  [address] [facility] [address]  [place] [address]  Phone: [phone]  Fax: [phone]  Established Patient  Follow Up Time: 1 week

## 2024-01-09 NOTE — PROGRESS NOTE ADULT - ASSESSMENT
83 f with     Acute on Chronic Systolic Congestive Heart Failure  - telemetry  - diurese   - echo noted  - cardiology follow    A Fib  - rate control  - AC on hold 2 to drop in Hct     L Breast abnormality  - US L breast - radiology not able to evaluate for possible breast ca Will r/o collection   - Surgical evaluation noted  - Dermatology evaluation for possible bx     Anemia iron deficiency  - follow Hct  - iron studies noted  - Iron supplementation   - Gastroenterology evaluation noted. No EGD/ Colon.    Diabetes Mellitus  - BS control  - ADA diet     CKD  - follow cr, lytes  - Nephrology evaluation Dr. Suarez called    PPM  - check    HTN control    Glaucoma  - continue gtt     Obesity morbid  - nutrition education    PT    DVT prophylaxis  - PAS    d/w patient , ACP  and daughter     Jamal Ramachandran MD phone [phone]  83 f with     Acute on Chronic Systolic Congestive Heart Failure  - telemetry  - diurese   - echo noted  - cardiology follow    A Fib  - rate control  - AC on hold 2 to drop in Hct     L Breast abnormality  - US L breast - radiology not able to evaluate for possible breast ca Will r/o collection   - Surgical evaluation noted  - Dermatology evaluation for possible bx     Anemia iron deficiency  - follow Hct  - iron studies noted  - Iron supplementation   - Gastroenterology evaluation noted. No EGD/ Colon.    Diabetes Mellitus  - BS control  - ADA diet     CKD  - follow cr, lytes  - Nephrology evaluation Dr. Suarez called    PPM  - check    HTN control    Glaucoma  - continue gtt     Obesity morbid  - nutrition education    PT    DVT prophylaxis  - PAS    d/w patient , ACP  and daughter     Jamal Ramachandran MD phone [phone]

## 2024-01-09 NOTE — CONSULT NOTE ADULT - SUBJECTIVE AND OBJECTIVE BOX
HPI:  83F hx of bradycardia s/p PPM presenting with 2 weeks of dyspnea worse with exertion, cough with white sputum, intermittent substernal CP that arises from exertion. Also reports L-sided swelling (arm, breast and leg). Had DVT ultrasounds of LUE and bilateral LLE, which were negative. 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  (05 Jan 2024 19:26)    Derm HPI: Derm team consulted for evaluation of L breast swelling with hx as above, and consideration of a skin biopsy. Patient seen with daughter present at bedside. Reports mild itch on the inferior aspect of the breast. Otherwise no complains of itch or rash elsewhere. Pt Reports the swelling has been improving over the last couple of days.     PAST MEDICAL & SURGICAL HISTORY:  Morbid obesity      HTN (hypertension)      Type 2 diabetes mellitus      HLD (hyperlipidemia)      2nd degree AV block      Glaucoma of left eye      Chronic atrial fibrillation      Pacemaker      H/O hernia repair      History of cholecystectomy      H/O left knee surgery      Cataract of right eye      Glaucoma  left eye          Review of Systems:  REVIEW OF SYSTEMS      General: no fevers/chills, no lethary	    Skin/Breast: see HPI  	  Ophthalmologic: no eye pain or change in vision  	  ENMT: no dysphagia or change in hearing    Respiratory and Thorax: see HPI  	  Cardiovascular: see HPI    Gastrointestinal: no abdominal pain or blood in stool     Genitourinary: no dysuria or frequency    Musculoskeletal: no joint pains or weakness	    Neurological:no weakness, numbness , or tingling    MEDICATIONS  (STANDING):  atorvastatin 40 milliGRAM(s) Oral at bedtime  brimonidine 0.2% Ophthalmic Solution 1 Drop(s) Both EYES daily  chlorhexidine 2% Cloths 1 Application(s) Topical daily  dextrose 5%. 1000 milliLiter(s) IV Continuous <Continuous>  dextrose 5%. 1000 milliLiter(s) IV Continuous <Continuous>  dextrose 50% Injectable 25 Gram(s) IV Push once  dextrose 50% Injectable 25 Gram(s) IV Push once  dextrose 50% Injectable 12.5 Gram(s) IV Push once  ferrous    sulfate 325 milliGRAM(s) Oral daily  furosemide   Injectable 40 milliGRAM(s) IV Push daily  glucagon  Injectable 1 milliGRAM(s) IntraMuscular once  hydrocortisone 2.5% Ointment 1 Application(s) Topical two times a day  insulin glargine Injectable (LANTUS) 10 Unit(s) SubCutaneous at bedtime  insulin glargine Injectable (LANTUS) 20 Unit(s) SubCutaneous every morning  insulin lispro (ADMELOG) corrective regimen sliding scale   SubCutaneous three times a day before meals  insulin lispro (ADMELOG) corrective regimen sliding scale   SubCutaneous at bedtime  metoprolol succinate ER 25 milliGRAM(s) Oral daily  Nephro-patsy 1 Tablet(s) Oral daily    ALLERGIES: lisinopril  penicillin        SOCIAL HISTORY:  ____________________________________  Social History:  Social History:    Marital Status:  (   )    (   ) Single    (   )    ( x )   Occupation:   Lives with: (  ) alone  ( x ) children   (  ) spouse   (  ) parents  (  ) other    Substance Use (street drugs): ( x ) never used  (  ) other:  Tobacco Usage:  (x   ) never smoked   (   ) former smoker   (   ) current smoker  (     ) pack years  (        ) last cigarette date  Alcohol Usage: no    (     ) Advanced Directives: (     ) None    (      ) DNR    (     ) DNI    (     ) Health Care Proxy:  FAMILY HISTORY:  Type 2 diabetes mellitus  mother          VITAL SIGNS LAST 24 HOURS:  T(F): 98 (01-09 @ 11:45), Max: 98.1 (01-09 @ 04:44)  HR: 70 (01-09 @ 16:48) (70 - 70)  BP: 153/74 (01-09 @ 16:48) (123/75 - 153/74)  RR: 18 (01-09 @ 11:45) (18 - 18)    PHYSICAL EXAM:     The patient was alert and in no  apparent distress.  There was no visible lymphadenopathy.  Conjunctiva were non injected  The scalp, hair, face, eyebrows, lips,  neck, chest, back,   extremities X 4, nails were examined.  There was no hyperhidrosis or bromhidrosis.    Of note on skin exam:   L breast with ill defined pitting edema, no other surface skin changes appreciated on exam of the L breast today.   ____________________________________    LABS:                        8.7    5.72  )-----------( 230      ( 09 Jan 2024 06:29 )             25.2     01-09    136  |  98  |  28<H>  ----------------------------<  192<H>  4.4   |  30  |  1.75<H>    Ca    8.9      09 Jan 2024 06:29    TPro  7.3  /  Alb  3.5  /  TBili  0.4  /  DBili  x   /  AST  38  /  ALT  26  /  AlkPhos  157<H>  01-09      Urinalysis Basic - ( 09 Jan 2024 06:29 )    Color: x / Appearance: x / SG: x / pH: x  Gluc: 192 mg/dL / Ketone: x  / Bili: x / Urobili: x   Blood: x / Protein: x / Nitrite: x   Leuk Esterase: x / RBC: x / WBC x   Sq Epi: x / Non Sq Epi: x / Bacteria: x     HPI:  83F hx of bradycardia s/p PPM presenting with 2 weeks of dyspnea worse with exertion, cough with white sputum, intermittent substernal CP that arises from exertion. Also reports L-sided swelling (arm, breast and leg). Had DVT ultrasounds of LUE and bilateral LLE, which were negative. 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  (05 Jan 2024 19:26)    Derm HPI: Derm team consulted for evaluation of L breast swelling with hx as above, and consideration of a skin biopsy. Patient seen with daughter present at bedside. Reports mild itch on the inferior aspect of the breast. Otherwise no complains of itch or rash elsewhere. Pt Reports the swelling has been improving over the last couple of days.     PAST MEDICAL & SURGICAL HISTORY:  Morbid obesity      HTN (hypertension)      Type 2 diabetes mellitus      HLD (hyperlipidemia)      2nd degree AV block      Glaucoma of left eye      Chronic atrial fibrillation      Pacemaker      H/O hernia repair      History of cholecystectomy      H/O left knee surgery      Cataract of right eye      Glaucoma  left eye          Review of Systems:  REVIEW OF SYSTEMS      General: no fevers/chills, no lethary	    Skin/Breast: see HPI  	  Ophthalmologic: no eye pain or change in vision  	  ENMT: no dysphagia or change in hearing    Respiratory and Thorax: see HPI  	  Cardiovascular: see HPI    Gastrointestinal: no abdominal pain or blood in stool     Genitourinary: no dysuria or frequency    Musculoskeletal: no joint pains or weakness	    Neurological:no weakness, numbness , or tingling    MEDICATIONS  (STANDING):  atorvastatin 40 milliGRAM(s) Oral at bedtime  brimonidine 0.2% Ophthalmic Solution 1 Drop(s) Both EYES daily  chlorhexidine 2% Cloths 1 Application(s) Topical daily  dextrose 5%. 1000 milliLiter(s) IV Continuous <Continuous>  dextrose 5%. 1000 milliLiter(s) IV Continuous <Continuous>  dextrose 50% Injectable 25 Gram(s) IV Push once  dextrose 50% Injectable 25 Gram(s) IV Push once  dextrose 50% Injectable 12.5 Gram(s) IV Push once  ferrous    sulfate 325 milliGRAM(s) Oral daily  furosemide   Injectable 40 milliGRAM(s) IV Push daily  glucagon  Injectable 1 milliGRAM(s) IntraMuscular once  hydrocortisone 2.5% Ointment 1 Application(s) Topical two times a day  insulin glargine Injectable (LANTUS) 10 Unit(s) SubCutaneous at bedtime  insulin glargine Injectable (LANTUS) 20 Unit(s) SubCutaneous every morning  insulin lispro (ADMELOG) corrective regimen sliding scale   SubCutaneous three times a day before meals  insulin lispro (ADMELOG) corrective regimen sliding scale   SubCutaneous at bedtime  metoprolol succinate ER 25 milliGRAM(s) Oral daily  Nephro-patsy 1 Tablet(s) Oral daily    ALLERGIES: lisinopril  penicillin        SOCIAL HISTORY:  ____________________________________  Social History: denies drug use     Marital Status:  (   )    (   ) Single    (   )    ( x )   Occupation:   Lives with: (  ) alone  ( x ) children   (  ) spouse   (  ) parents  (  ) other    Substance Use (street drugs): ( x ) never used  (  ) other:  Tobacco Usage:  (x   ) never smoked   (   ) former smoker   (   ) current smoker  (     ) pack years  (        ) last cigarette date  Alcohol Usage: no    (     ) Advanced Directives: (     ) None    (      ) DNR    (     ) DNI    (     ) Health Care Proxy:  FAMILY HISTORY:  Type 2 diabetes mellitus  mother          VITAL SIGNS LAST 24 HOURS:  T(F): 98 (01-09 @ 11:45), Max: 98.1 (01-09 @ 04:44)  HR: 70 (01-09 @ 16:48) (70 - 70)  BP: 153/74 (01-09 @ 16:48) (123/75 - 153/74)  RR: 18 (01-09 @ 11:45) (18 - 18)    PHYSICAL EXAM:     The patient was alert and in no  apparent distress.  There was no visible lymphadenopathy.  Conjunctiva were non injected  The scalp, hair, face, eyebrows, lips,  neck, chest, back,   extremities X 4, nails were examined.  There was no hyperhidrosis or bromhidrosis.    Of note on skin exam:   L breast with ill defined pitting edema, no other surface skin changes appreciated on exam of the L breast today.   ____________________________________    LABS:                        8.7    5.72  )-----------( 230      ( 09 Jan 2024 06:29 )             25.2     01-09    136  |  98  |  28<H>  ----------------------------<  192<H>  4.4   |  30  |  1.75<H>    Ca    8.9      09 Jan 2024 06:29    TPro  7.3  /  Alb  3.5  /  TBili  0.4  /  DBili  x   /  AST  38  /  ALT  26  /  AlkPhos  157<H>  01-09      Urinalysis Basic - ( 09 Jan 2024 06:29 )    Color: x / Appearance: x / SG: x / pH: x  Gluc: 192 mg/dL / Ketone: x  / Bili: x / Urobili: x   Blood: x / Protein: x / Nitrite: x   Leuk Esterase: x / RBC: x / WBC x   Sq Epi: x / Non Sq Epi: x / Bacteria: x

## 2024-01-09 NOTE — CONSULT NOTE ADULT - SUBJECTIVE AND OBJECTIVE BOX
BREAST SURGERY CONSULT NOTE  --------------------------------------------------------------------------------------------    HPI:   Patient is a 83y old  Female who presents with a chief complaint of Heart failure.    Per chart, pt is an 83 year old Female with PMH of Bradycardia s/p PPM, AFib on Eliquis, HTN, CKD, T2DM, presenting c/o SOB on exertion, cough w/ white sputum x2 wks. Also reports L-sided swelling (arm, breast and leg).  Had DVT, US of LUE and bilateral LLE, which were negative. 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 Surgery consulted for further eval due to ultrasound unable to be performed.   Per radiology [facility] is not a breast center and the radiologists are unable to give results for a concern for malignancy. Patient would have to be transferred to another hospital that is a breast center. Breast US here would be performed to r/o abscess.   WBC 5.72 today.    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 1000 milliLiter(s) IV Continuous <Continuous>  dextrose 5%. 1000 milliLiter(s) IV Continuous <Continuous>  dextrose 50% Injectable 12.5 Gram(s) IV Push once  dextrose 50% Injectable 25 Gram(s) IV Push once  dextrose 50% Injectable 25 Gram(s) IV Push once  ferrous    sulfate 325 milliGRAM(s) Oral daily  furosemide   Injectable 40 milliGRAM(s) IV Push daily  glucagon  Injectable 1 milliGRAM(s) IntraMuscular once  insulin glargine Injectable (LANTUS) 20 Unit(s) SubCutaneous every morning  insulin glargine Injectable (LANTUS) 10 Unit(s) SubCutaneous at bedtime  insulin lispro (ADMELOG) corrective regimen sliding scale   SubCutaneous three times a day before meals  insulin lispro (ADMELOG) corrective regimen sliding scale   SubCutaneous at bedtime  metoprolol succinate ER 25 milliGRAM(s) Oral daily  Nephro-patsy 1 Tablet(s) Oral daily    MEDICATIONS (PRN):acetaminophen     Tablet .. 650 milliGRAM(s) Oral every 6 hours PRN Temp greater or equal to 38.5C (101.3F), Mild Pain (1 - 3)  albuterol    90 MICROgram(s) HFA Inhaler 2 Puff(s) Inhalation every 6 hours PRN Shortness of Breath and/or Wheezing  benzonatate 100 milliGRAM(s) Oral every 8 hours PRN Cough  dextrose Oral Gel 15 Gram(s) Oral once PRN Blood Glucose LESS THAN 70 milliGRAM(s)/deciliter  guaiFENesin Oral Liquid (Sugar-Free) 100 milliGRAM(s) Oral every 6 hours PRN Cough    --------------------------------------------------------------------------------------------    Vitals:   T(C): 36.7 (01-09-24 @ 04:44), Max: 36.9 (01-08-24 @ 20:11)  HR: 70 (01-09-24 @ 04:44) (69 - 70)  BP: 131/62 (01-09-24 @ 04:44) (121/74 - 131/62)  RR: 18 (01-09-24 @ 04:44) (18 - 18)  SpO2: 96% (01-09-24 @ 04:44) (96% - 98%)  CAPILLARY BLOOD GLUCOSE      POCT Blood Glucose.: 213 mg/dL (09 Jan 2024 07:35)  POCT Blood Glucose.: 179 mg/dL (08 Jan 2024 21:14)  POCT Blood Glucose.: 192 mg/dL (08 Jan 2024 17:07)  POCT Blood Glucose.: 204 mg/dL (08 Jan 2024 11:48)      01-08 @ 07:01  -  01-09 @ 07:00  --------------------------------------------------------  IN:    Oral Fluid: 440 mL  Total IN: 440 mL    OUT:    Voided (mL): 2950 mL  Total OUT: 2950 mL  Total NET: -2510 mL    Height (cm): 165.1 (01-05 @ 10:23)  Weight (kg): 113.4 (01-05 @ 10:23)  BMI (kg/m2): 41.6 (01-05 @ 10:23)  BSA (m2): 2.17 (01-05 @ 10:23)      --------------------------------------------------------------------------------------------    LABS  CBC (01-09 @ 06:29)                              8.7<L>                         5.72    )----------------(  230        --    % Neutrophils, --    % Lymphocytes, ANC: --                                  25.2<L>    BMP (01-09 @ 06:29)             136     |  98      |  28<H> 		Ca++ --      Ca 8.9                ---------------------------------( 192<H>		Mg --                 4.4     |  30      |  1.75<H>			Ph --      BMP (01-08 @ 07:24)             140     |  99      |  31<H> 		Ca++ --      Ca 9.0                ---------------------------------( 100<H>		Mg --                 4.4     |  30      |  2.12<H>			Ph --        LFTs (01-09 @ 06:29)      TPro 7.3 / Alb 3.5 / TBili 0.4 / DBili -- / AST 38 / ALT 26 / AlkPhos 157<H>  LFTs (01-08 @ 07:24)      TPro 7.2 / Alb 3.1<L> / TBili 0.4 / DBili -- / AST 29 / ALT 23 / AlkPhos 164<H>  --------------------------------------------------------------------------------------------  MICROBIOLOGY  Urinalysis (01-09 @ 06:29):     Color:  / Appearance:  / SG:  / pH:  / Gluc: 192<H> / Ketones:  / Bili:  / Urobili:  / Protein : / Nitrites:  / Leuk.Est:  / RBC:  / WBC:  / Sq Epi:  / Non Sq Epi:  / Bacteria      -> .Blood Blood-Peripheral Culture (01-05 @ 12:40)     NG    NG  No growth at 72 Hours  -> .Blood Blood-Peripheral Culture (01-05 @ 12:30)     NG    NG  No growth at 72 Hours  --------------------------------------------------------------------------------------------  PHYSICAL EXAM:  GENERAL: NAD, well-developed   HEAD:  Atraumatic, Normocephalic  NECK: Supple, No JVD  CHEST/LUNG:  L breast with erythema tender   ABDOMEN: Soft, Nontender  EXTREMITIES:  2+ bipedal edema  PSYCH: AAOx3  NEUROLOGY: non-focal  --------------------------------------------------------------------------------------------  IMAGING    --------------------------------------------------------------------------------------------             BREAST SURGERY CONSULT NOTE  --------------------------------------------------------------------------------------------    HPI:   Patient is a 83y old  Female who presents with a chief complaint of Heart failure.    Per chart, pt is an 83 year old Female with PMH of Bradycardia s/p PPM, AFib on Eliquis, HTN, CKD, T2DM, presenting c/o SOB on exertion, cough w/ white sputum x2 wks. Also reports L-sided swelling (arm, breast and leg).  Had DVT, US of LUE and bilateral LLE, which were negative. 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 Surgery consulted for further eval due to ultrasound unable to be performed.   Per radiology [facility] is not a breast center and the radiologists are unable to give results for a concern for malignancy. Patient would have to be transferred to another hospital that is a breast center. Breast US here would be performed to r/o abscess.   WBC 5.72 today.    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 1000 milliLiter(s) IV Continuous <Continuous>  dextrose 5%. 1000 milliLiter(s) IV Continuous <Continuous>  dextrose 50% Injectable 12.5 Gram(s) IV Push once  dextrose 50% Injectable 25 Gram(s) IV Push once  dextrose 50% Injectable 25 Gram(s) IV Push once  ferrous    sulfate 325 milliGRAM(s) Oral daily  furosemide   Injectable 40 milliGRAM(s) IV Push daily  glucagon  Injectable 1 milliGRAM(s) IntraMuscular once  insulin glargine Injectable (LANTUS) 20 Unit(s) SubCutaneous every morning  insulin glargine Injectable (LANTUS) 10 Unit(s) SubCutaneous at bedtime  insulin lispro (ADMELOG) corrective regimen sliding scale   SubCutaneous three times a day before meals  insulin lispro (ADMELOG) corrective regimen sliding scale   SubCutaneous at bedtime  metoprolol succinate ER 25 milliGRAM(s) Oral daily  Nephro-patsy 1 Tablet(s) Oral daily    MEDICATIONS (PRN):acetaminophen     Tablet .. 650 milliGRAM(s) Oral every 6 hours PRN Temp greater or equal to 38.5C (101.3F), Mild Pain (1 - 3)  albuterol    90 MICROgram(s) HFA Inhaler 2 Puff(s) Inhalation every 6 hours PRN Shortness of Breath and/or Wheezing  benzonatate 100 milliGRAM(s) Oral every 8 hours PRN Cough  dextrose Oral Gel 15 Gram(s) Oral once PRN Blood Glucose LESS THAN 70 milliGRAM(s)/deciliter  guaiFENesin Oral Liquid (Sugar-Free) 100 milliGRAM(s) Oral every 6 hours PRN Cough    --------------------------------------------------------------------------------------------    Vitals:   T(C): 36.7 (01-09-24 @ 04:44), Max: 36.9 (01-08-24 @ 20:11)  HR: 70 (01-09-24 @ 04:44) (69 - 70)  BP: 131/62 (01-09-24 @ 04:44) (121/74 - 131/62)  RR: 18 (01-09-24 @ 04:44) (18 - 18)  SpO2: 96% (01-09-24 @ 04:44) (96% - 98%)  CAPILLARY BLOOD GLUCOSE      POCT Blood Glucose.: 213 mg/dL (09 Jan 2024 07:35)  POCT Blood Glucose.: 179 mg/dL (08 Jan 2024 21:14)  POCT Blood Glucose.: 192 mg/dL (08 Jan 2024 17:07)  POCT Blood Glucose.: 204 mg/dL (08 Jan 2024 11:48)      01-08 @ 07:01  -  01-09 @ 07:00  --------------------------------------------------------  IN:    Oral Fluid: 440 mL  Total IN: 440 mL    OUT:    Voided (mL): 2950 mL  Total OUT: 2950 mL  Total NET: -2510 mL    Height (cm): 165.1 (01-05 @ 10:23)  Weight (kg): 113.4 (01-05 @ 10:23)  BMI (kg/m2): 41.6 (01-05 @ 10:23)  BSA (m2): 2.17 (01-05 @ 10:23)      --------------------------------------------------------------------------------------------    LABS  CBC (01-09 @ 06:29)                              8.7<L>                         5.72    )----------------(  230        --    % Neutrophils, --    % Lymphocytes, ANC: --                                  25.2<L>    BMP (01-09 @ 06:29)             136     |  98      |  28<H> 		Ca++ --      Ca 8.9                ---------------------------------( 192<H>		Mg --                 4.4     |  30      |  1.75<H>			Ph --      BMP (01-08 @ 07:24)             140     |  99      |  31<H> 		Ca++ --      Ca 9.0                ---------------------------------( 100<H>		Mg --                 4.4     |  30      |  2.12<H>			Ph --        LFTs (01-09 @ 06:29)      TPro 7.3 / Alb 3.5 / TBili 0.4 / DBili -- / AST 38 / ALT 26 / AlkPhos 157<H>  LFTs (01-08 @ 07:24)      TPro 7.2 / Alb 3.1<L> / TBili 0.4 / DBili -- / AST 29 / ALT 23 / AlkPhos 164<H>  --------------------------------------------------------------------------------------------  MICROBIOLOGY  Urinalysis (01-09 @ 06:29):     Color:  / Appearance:  / SG:  / pH:  / Gluc: 192<H> / Ketones:  / Bili:  / Urobili:  / Protein : / Nitrites:  / Leuk.Est:  / RBC:  / WBC:  / Sq Epi:  / Non Sq Epi:  / Bacteria      -> .Blood Blood-Peripheral Culture (01-05 @ 12:40)     NG    NG  No growth at 72 Hours  -> .Blood Blood-Peripheral Culture (01-05 @ 12:30)     NG    NG  No growth at 72 Hours  --------------------------------------------------------------------------------------------  PHYSICAL EXAM:  GENERAL: NAD, well-developed   HEAD:  Atraumatic, Normocephalic  NECK: Supple, No JVD  CHEST/LUNG:  L breast with erythema tender   ABDOMEN: Soft, Nontender  EXTREMITIES:  2+ bipedal edema  PSYCH: AAOx3  NEUROLOGY: non-focal  --------------------------------------------------------------------------------------------  IMAGING    --------------------------------------------------------------------------------------------             BREAST SURGERY CONSULT NOTE  --------------------------------------------------------------------------------------------    HPI:   Patient is a 83y old  Female who presents with a chief complaint of Heart failure.    Per chart, pt is an 83 year old Female with PMH of Bradycardia s/p PPM, AFib on Eliquis, HTN, CKD, T2DM, presenting c/o SOB on exertion, cough w/ white sputum x2 wks. Also reports L-sided swelling (arm, breast and leg).  Had DVT, US of LUE and bilateral LLE, which were negative. Patient went to PCP complaining of heavy painful breast, in which she was sent for a mammogram of L breast which showed findings c/f "inflammatory carcinoma". Surgery consulted for further eval due to ultrasound unable to be performed.   Per radiology [facility] is not a breast center and the radiologists are unable to give results for a concern for malignancy. Patient would have to be transferred to another hospital that is a breast center. Breast US here would be performed to r/o abscess.   WBC 5.72 today. She denies having any discharge from breast, denies nipple changes. Denies any previous abnormal MRI findings.  Daughter at bedside.     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 1000 milliLiter(s) IV Continuous <Continuous>  dextrose 5%. 1000 milliLiter(s) IV Continuous <Continuous>  dextrose 50% Injectable 12.5 Gram(s) IV Push once  dextrose 50% Injectable 25 Gram(s) IV Push once  dextrose 50% Injectable 25 Gram(s) IV Push once  ferrous    sulfate 325 milliGRAM(s) Oral daily  furosemide   Injectable 40 milliGRAM(s) IV Push daily  glucagon  Injectable 1 milliGRAM(s) IntraMuscular once  insulin glargine Injectable (LANTUS) 20 Unit(s) SubCutaneous every morning  insulin glargine Injectable (LANTUS) 10 Unit(s) SubCutaneous at bedtime  insulin lispro (ADMELOG) corrective regimen sliding scale   SubCutaneous three times a day before meals  insulin lispro (ADMELOG) corrective regimen sliding scale   SubCutaneous at bedtime  metoprolol succinate ER 25 milliGRAM(s) Oral daily  Nephro-patsy 1 Tablet(s) Oral daily    MEDICATIONS (PRN):acetaminophen     Tablet .. 650 milliGRAM(s) Oral every 6 hours PRN Temp greater or equal to 38.5C (101.3F), Mild Pain (1 - 3)  albuterol    90 MICROgram(s) HFA Inhaler 2 Puff(s) Inhalation every 6 hours PRN Shortness of Breath and/or Wheezing  benzonatate 100 milliGRAM(s) Oral every 8 hours PRN Cough  dextrose Oral Gel 15 Gram(s) Oral once PRN Blood Glucose LESS THAN 70 milliGRAM(s)/deciliter  guaiFENesin Oral Liquid (Sugar-Free) 100 milliGRAM(s) Oral every 6 hours PRN Cough    --------------------------------------------------------------------------------------------    Vitals:   T(C): 36.7 (01-09-24 @ 04:44), Max: 36.9 (01-08-24 @ 20:11)  HR: 70 (01-09-24 @ 04:44) (69 - 70)  BP: 131/62 (01-09-24 @ 04:44) (121/74 - 131/62)  RR: 18 (01-09-24 @ 04:44) (18 - 18)  SpO2: 96% (01-09-24 @ 04:44) (96% - 98%)  CAPILLARY BLOOD GLUCOSE      POCT Blood Glucose.: 213 mg/dL (09 Jan 2024 07:35)  POCT Blood Glucose.: 179 mg/dL (08 Jan 2024 21:14)  POCT Blood Glucose.: 192 mg/dL (08 Jan 2024 17:07)  POCT Blood Glucose.: 204 mg/dL (08 Jan 2024 11:48)      01-08 @ 07:01  -  01-09 @ 07:00  --------------------------------------------------------  IN:    Oral Fluid: 440 mL  Total IN: 440 mL    OUT:    Voided (mL): 2950 mL  Total OUT: 2950 mL  Total NET: -2510 mL    Height (cm): 165.1 (01-05 @ 10:23)  Weight (kg): 113.4 (01-05 @ 10:23)  BMI (kg/m2): 41.6 (01-05 @ 10:23)  BSA (m2): 2.17 (01-05 @ 10:23)      --------------------------------------------------------------------------------------------    LABS  CBC (01-09 @ 06:29)                              8.7<L>                         5.72    )----------------(  230        --    % Neutrophils, --    % Lymphocytes, ANC: --                                  25.2<L>    BMP (01-09 @ 06:29)             136     |  98      |  28<H> 		Ca++ --      Ca 8.9                ---------------------------------( 192<H>		Mg --                 4.4     |  30      |  1.75<H>			Ph --      BMP (01-08 @ 07:24)             140     |  99      |  31<H> 		Ca++ --      Ca 9.0                ---------------------------------( 100<H>		Mg --                 4.4     |  30      |  2.12<H>			Ph --        LFTs (01-09 @ 06:29)      TPro 7.3 / Alb 3.5 / TBili 0.4 / DBili -- / AST 38 / ALT 26 / AlkPhos 157<H>  LFTs (01-08 @ 07:24)      TPro 7.2 / Alb 3.1<L> / TBili 0.4 / DBili -- / AST 29 / ALT 23 / AlkPhos 164<H>  --------------------------------------------------------------------------------------------  MICROBIOLOGY  Urinalysis (01-09 @ 06:29):     Color:  / Appearance:  / SG:  / pH:  / Gluc: 192<H> / Ketones:  / Bili:  / Urobili:  / Protein : / Nitrites:  / Leuk.Est:  / RBC:  / WBC:  / Sq Epi:  / Non Sq Epi:  / Bacteria      -> .Blood Blood-Peripheral Culture (01-05 @ 12:40)     NG    NG  No growth at 72 Hours  -> .Blood Blood-Peripheral Culture (01-05 @ 12:30)     NG    NG  No growth at 72 Hours  --------------------------------------------------------------------------------------------  PHYSICAL EXAM:  GENERAL: NAD, well-developed   HEAD:  Atraumatic, Normocephalic  NECK: Supple, No JVD  CHEST/LUNG:  L breast tender 10'o clock, mild skin changes noted, no masses palpated   ABDOMEN: Soft, Nontender  PSYCH: AAOx3  NEUROLOGY: non-focal  --------------------------------------------------------------------------------------------  IMAGING    --------------------------------------------------------------------------------------------             BREAST SURGERY CONSULT NOTE  --------------------------------------------------------------------------------------------    HPI:   Patient is a 83y old  Female who presents with a chief complaint of Heart failure.    Per chart, pt is an 83 year old Female with PMH of Bradycardia s/p PPM, AFib on Eliquis, HTN, CKD, T2DM, presenting c/o SOB on exertion, cough w/ white sputum x2 wks. Also reports L-sided swelling (arm, breast and leg).  Had DVT, US of LUE and bilateral LLE, which were negative. Patient went to PCP complaining of heavy painful breast, in which she was sent for a mammogram of L breast which showed findings c/f "inflammatory carcinoma". Surgery consulted for further eval due to ultrasound unable to be performed.   Per radiology [facility] is not a breast center and the radiologists are unable to give results for a concern for malignancy. Patient would have to be transferred to another hospital that is a breast center. Breast US here would be performed to r/o abscess.   WBC 5.72 today. She denies having any discharge from breast, denies nipple changes. Denies any previous abnormal MRI findings.  Daughter at bedside.     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 1000 milliLiter(s) IV Continuous <Continuous>  dextrose 5%. 1000 milliLiter(s) IV Continuous <Continuous>  dextrose 50% Injectable 12.5 Gram(s) IV Push once  dextrose 50% Injectable 25 Gram(s) IV Push once  dextrose 50% Injectable 25 Gram(s) IV Push once  ferrous    sulfate 325 milliGRAM(s) Oral daily  furosemide   Injectable 40 milliGRAM(s) IV Push daily  glucagon  Injectable 1 milliGRAM(s) IntraMuscular once  insulin glargine Injectable (LANTUS) 20 Unit(s) SubCutaneous every morning  insulin glargine Injectable (LANTUS) 10 Unit(s) SubCutaneous at bedtime  insulin lispro (ADMELOG) corrective regimen sliding scale   SubCutaneous three times a day before meals  insulin lispro (ADMELOG) corrective regimen sliding scale   SubCutaneous at bedtime  metoprolol succinate ER 25 milliGRAM(s) Oral daily  Nephro-patsy 1 Tablet(s) Oral daily    MEDICATIONS (PRN):acetaminophen     Tablet .. 650 milliGRAM(s) Oral every 6 hours PRN Temp greater or equal to 38.5C (101.3F), Mild Pain (1 - 3)  albuterol    90 MICROgram(s) HFA Inhaler 2 Puff(s) Inhalation every 6 hours PRN Shortness of Breath and/or Wheezing  benzonatate 100 milliGRAM(s) Oral every 8 hours PRN Cough  dextrose Oral Gel 15 Gram(s) Oral once PRN Blood Glucose LESS THAN 70 milliGRAM(s)/deciliter  guaiFENesin Oral Liquid (Sugar-Free) 100 milliGRAM(s) Oral every 6 hours PRN Cough    --------------------------------------------------------------------------------------------    Vitals:   T(C): 36.7 (01-09-24 @ 04:44), Max: 36.9 (01-08-24 @ 20:11)  HR: 70 (01-09-24 @ 04:44) (69 - 70)  BP: 131/62 (01-09-24 @ 04:44) (121/74 - 131/62)  RR: 18 (01-09-24 @ 04:44) (18 - 18)  SpO2: 96% (01-09-24 @ 04:44) (96% - 98%)  CAPILLARY BLOOD GLUCOSE      POCT Blood Glucose.: 213 mg/dL (09 Jan 2024 07:35)  POCT Blood Glucose.: 179 mg/dL (08 Jan 2024 21:14)  POCT Blood Glucose.: 192 mg/dL (08 Jan 2024 17:07)  POCT Blood Glucose.: 204 mg/dL (08 Jan 2024 11:48)      01-08 @ 07:01  -  01-09 @ 07:00  --------------------------------------------------------  IN:    Oral Fluid: 440 mL  Total IN: 440 mL    OUT:    Voided (mL): 2950 mL  Total OUT: 2950 mL  Total NET: -2510 mL    Height (cm): 165.1 (01-05 @ 10:23)  Weight (kg): 113.4 (01-05 @ 10:23)  BMI (kg/m2): 41.6 (01-05 @ 10:23)  BSA (m2): 2.17 (01-05 @ 10:23)      --------------------------------------------------------------------------------------------    LABS  CBC (01-09 @ 06:29)                              8.7<L>                         5.72    )----------------(  230        --    % Neutrophils, --    % Lymphocytes, ANC: --                                  25.2<L>    BMP (01-09 @ 06:29)             136     |  98      |  28<H> 		Ca++ --      Ca 8.9                ---------------------------------( 192<H>		Mg --                 4.4     |  30      |  1.75<H>			Ph --      BMP (01-08 @ 07:24)             140     |  99      |  31<H> 		Ca++ --      Ca 9.0                ---------------------------------( 100<H>		Mg --                 4.4     |  30      |  2.12<H>			Ph --        LFTs (01-09 @ 06:29)      TPro 7.3 / Alb 3.5 / TBili 0.4 / DBili -- / AST 38 / ALT 26 / AlkPhos 157<H>  LFTs (01-08 @ 07:24)      TPro 7.2 / Alb 3.1<L> / TBili 0.4 / DBili -- / AST 29 / ALT 23 / AlkPhos 164<H>  --------------------------------------------------------------------------------------------  MICROBIOLOGY  Urinalysis (01-09 @ 06:29):     Color:  / Appearance:  / SG:  / pH:  / Gluc: 192<H> / Ketones:  / Bili:  / Urobili:  / Protein : / Nitrites:  / Leuk.Est:  / RBC:  / WBC:  / Sq Epi:  / Non Sq Epi:  / Bacteria      -> .Blood Blood-Peripheral Culture (01-05 @ 12:40)     NG    NG  No growth at 72 Hours  -> .Blood Blood-Peripheral Culture (01-05 @ 12:30)     NG    NG  No growth at 72 Hours  --------------------------------------------------------------------------------------------  PHYSICAL EXAM:  GENERAL: NAD, well-developed   HEAD:  Atraumatic, Normocephalic  NECK: Supple, No JVD  CHEST/LUNG:  L breast tender 10'o clock, mild skin changes noted, no masses palpated   ABDOMEN: Soft, Nontender  PSYCH: AAOx3  NEUROLOGY: non-focal  --------------------------------------------------------------------------------------------  IMAGING    --------------------------------------------------------------------------------------------             BREAST SURGERY CONSULT NOTE  --------------------------------------------------------------------------------------------    HPI:   Patient is a 83y old  Female who presents with a chief complaint of Heart failure.    Per chart, pt is an 83 year old Female with PMH of Bradycardia s/p PPM, AFib on Eliquis, HTN, CKD, T2DM, presenting c/o SOB on exertion, cough w/ white sputum x2 wks. Also reports L-sided swelling (arm, breast and leg).  Had DVT, US of LUE and bilateral LLE, which were negative. Patient went to PCP complaining of heavy painful breast, in which she was sent for a mammogram of L breast which showed findings c/f "inflammatory carcinoma". Surgery consulted for further eval due to ultrasound unable to be performed.   Per radiology [facility] is not a breast center and the radiologists are unable to give results for a concern for malignancy. Patient would have to be transferred to another hospital that is a breast center.  WBC 5.72 today. She denies having any discharge from breast, denies nipple changes. Denies any previous abnormal MRI findings.  Daughter at bedside.     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 1000 milliLiter(s) IV Continuous <Continuous>  dextrose 5%. 1000 milliLiter(s) IV Continuous <Continuous>  dextrose 50% Injectable 12.5 Gram(s) IV Push once  dextrose 50% Injectable 25 Gram(s) IV Push once  dextrose 50% Injectable 25 Gram(s) IV Push once  ferrous    sulfate 325 milliGRAM(s) Oral daily  furosemide   Injectable 40 milliGRAM(s) IV Push daily  glucagon  Injectable 1 milliGRAM(s) IntraMuscular once  insulin glargine Injectable (LANTUS) 20 Unit(s) SubCutaneous every morning  insulin glargine Injectable (LANTUS) 10 Unit(s) SubCutaneous at bedtime  insulin lispro (ADMELOG) corrective regimen sliding scale   SubCutaneous three times a day before meals  insulin lispro (ADMELOG) corrective regimen sliding scale   SubCutaneous at bedtime  metoprolol succinate ER 25 milliGRAM(s) Oral daily  Nephro-patsy 1 Tablet(s) Oral daily    MEDICATIONS (PRN):acetaminophen     Tablet .. 650 milliGRAM(s) Oral every 6 hours PRN Temp greater or equal to 38.5C (101.3F), Mild Pain (1 - 3)  albuterol    90 MICROgram(s) HFA Inhaler 2 Puff(s) Inhalation every 6 hours PRN Shortness of Breath and/or Wheezing  benzonatate 100 milliGRAM(s) Oral every 8 hours PRN Cough  dextrose Oral Gel 15 Gram(s) Oral once PRN Blood Glucose LESS THAN 70 milliGRAM(s)/deciliter  guaiFENesin Oral Liquid (Sugar-Free) 100 milliGRAM(s) Oral every 6 hours PRN Cough    --------------------------------------------------------------------------------------------    Vitals:   T(C): 36.7 (01-09-24 @ 04:44), Max: 36.9 (01-08-24 @ 20:11)  HR: 70 (01-09-24 @ 04:44) (69 - 70)  BP: 131/62 (01-09-24 @ 04:44) (121/74 - 131/62)  RR: 18 (01-09-24 @ 04:44) (18 - 18)  SpO2: 96% (01-09-24 @ 04:44) (96% - 98%)  CAPILLARY BLOOD GLUCOSE      POCT Blood Glucose.: 213 mg/dL (09 Jan 2024 07:35)  POCT Blood Glucose.: 179 mg/dL (08 Jan 2024 21:14)  POCT Blood Glucose.: 192 mg/dL (08 Jan 2024 17:07)  POCT Blood Glucose.: 204 mg/dL (08 Jan 2024 11:48)      01-08 @ 07:01  -  01-09 @ 07:00  --------------------------------------------------------  IN:    Oral Fluid: 440 mL  Total IN: 440 mL    OUT:    Voided (mL): 2950 mL  Total OUT: 2950 mL  Total NET: -2510 mL    Height (cm): 165.1 (01-05 @ 10:23)  Weight (kg): 113.4 (01-05 @ 10:23)  BMI (kg/m2): 41.6 (01-05 @ 10:23)  BSA (m2): 2.17 (01-05 @ 10:23)      --------------------------------------------------------------------------------------------    LABS  CBC (01-09 @ 06:29)                              8.7<L>                         5.72    )----------------(  230        --    % Neutrophils, --    % Lymphocytes, ANC: --                                  25.2<L>    BMP (01-09 @ 06:29)             136     |  98      |  28<H> 		Ca++ --      Ca 8.9                ---------------------------------( 192<H>		Mg --                 4.4     |  30      |  1.75<H>			Ph --      BMP (01-08 @ 07:24)             140     |  99      |  31<H> 		Ca++ --      Ca 9.0                ---------------------------------( 100<H>		Mg --                 4.4     |  30      |  2.12<H>			Ph --        LFTs (01-09 @ 06:29)      TPro 7.3 / Alb 3.5 / TBili 0.4 / DBili -- / AST 38 / ALT 26 / AlkPhos 157<H>  LFTs (01-08 @ 07:24)      TPro 7.2 / Alb 3.1<L> / TBili 0.4 / DBili -- / AST 29 / ALT 23 / AlkPhos 164<H>  --------------------------------------------------------------------------------------------  MICROBIOLOGY  Urinalysis (01-09 @ 06:29):     Color:  / Appearance:  / SG:  / pH:  / Gluc: 192<H> / Ketones:  / Bili:  / Urobili:  / Protein : / Nitrites:  / Leuk.Est:  / RBC:  / WBC:  / Sq Epi:  / Non Sq Epi:  / Bacteria      -> .Blood Blood-Peripheral Culture (01-05 @ 12:40)     NG    NG  No growth at 72 Hours  -> .Blood Blood-Peripheral Culture (01-05 @ 12:30)     NG    NG  No growth at 72 Hours  --------------------------------------------------------------------------------------------  PHYSICAL EXAM:  GENERAL: NAD, well-developed   HEAD:  Atraumatic, Normocephalic  NECK: Supple, No JVD  CHEST/LUNG:  L breast tender 10'o clock, mild skin darkening change noted, no masses palpated, no abnormal nipple findings, no errythema seen, no discharge noted from breast. no cellulitic/asbcess changes identified  ABDOMEN: Soft, Nontender  PSYCH: AAOx3  NEUROLOGY: non-focal  --------------------------------------------------------------------------------------------  IMAGING:    OUTPATIENT MRI IMAGES AT [facility]  --------------------------------------------------------------------------------------------             BREAST SURGERY CONSULT NOTE  --------------------------------------------------------------------------------------------    HPI:   Patient is a 83y old  Female who presents with a chief complaint of Heart failure.    Per chart, pt is an 83 year old Female with PMH of Bradycardia s/p PPM, AFib on Eliquis, HTN, CKD, T2DM, presenting c/o SOB on exertion, cough w/ white sputum x2 wks. Also reports L-sided swelling (arm, breast and leg).  Had DVT, US of LUE and bilateral LLE, which were negative. Patient went to PCP complaining of heavy painful breast, in which she was sent for a mammogram of L breast which showed findings c/f "inflammatory carcinoma". Surgery consulted for further eval due to ultrasound unable to be performed.   Per radiology [facility] is not a breast center and the radiologists are unable to give results for a concern for malignancy. Patient would have to be transferred to another hospital that is a breast center.  WBC 5.72 today. She denies having any discharge from breast, denies nipple changes. Denies any previous abnormal MRI findings.  Daughter at bedside.     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 1000 milliLiter(s) IV Continuous <Continuous>  dextrose 5%. 1000 milliLiter(s) IV Continuous <Continuous>  dextrose 50% Injectable 12.5 Gram(s) IV Push once  dextrose 50% Injectable 25 Gram(s) IV Push once  dextrose 50% Injectable 25 Gram(s) IV Push once  ferrous    sulfate 325 milliGRAM(s) Oral daily  furosemide   Injectable 40 milliGRAM(s) IV Push daily  glucagon  Injectable 1 milliGRAM(s) IntraMuscular once  insulin glargine Injectable (LANTUS) 20 Unit(s) SubCutaneous every morning  insulin glargine Injectable (LANTUS) 10 Unit(s) SubCutaneous at bedtime  insulin lispro (ADMELOG) corrective regimen sliding scale   SubCutaneous three times a day before meals  insulin lispro (ADMELOG) corrective regimen sliding scale   SubCutaneous at bedtime  metoprolol succinate ER 25 milliGRAM(s) Oral daily  Nephro-patsy 1 Tablet(s) Oral daily    MEDICATIONS (PRN):acetaminophen     Tablet .. 650 milliGRAM(s) Oral every 6 hours PRN Temp greater or equal to 38.5C (101.3F), Mild Pain (1 - 3)  albuterol    90 MICROgram(s) HFA Inhaler 2 Puff(s) Inhalation every 6 hours PRN Shortness of Breath and/or Wheezing  benzonatate 100 milliGRAM(s) Oral every 8 hours PRN Cough  dextrose Oral Gel 15 Gram(s) Oral once PRN Blood Glucose LESS THAN 70 milliGRAM(s)/deciliter  guaiFENesin Oral Liquid (Sugar-Free) 100 milliGRAM(s) Oral every 6 hours PRN Cough    --------------------------------------------------------------------------------------------    Vitals:   T(C): 36.7 (01-09-24 @ 04:44), Max: 36.9 (01-08-24 @ 20:11)  HR: 70 (01-09-24 @ 04:44) (69 - 70)  BP: 131/62 (01-09-24 @ 04:44) (121/74 - 131/62)  RR: 18 (01-09-24 @ 04:44) (18 - 18)  SpO2: 96% (01-09-24 @ 04:44) (96% - 98%)  CAPILLARY BLOOD GLUCOSE      POCT Blood Glucose.: 213 mg/dL (09 Jan 2024 07:35)  POCT Blood Glucose.: 179 mg/dL (08 Jan 2024 21:14)  POCT Blood Glucose.: 192 mg/dL (08 Jan 2024 17:07)  POCT Blood Glucose.: 204 mg/dL (08 Jan 2024 11:48)      01-08 @ 07:01  -  01-09 @ 07:00  --------------------------------------------------------  IN:    Oral Fluid: 440 mL  Total IN: 440 mL    OUT:    Voided (mL): 2950 mL  Total OUT: 2950 mL  Total NET: -2510 mL    Height (cm): 165.1 (01-05 @ 10:23)  Weight (kg): 113.4 (01-05 @ 10:23)  BMI (kg/m2): 41.6 (01-05 @ 10:23)  BSA (m2): 2.17 (01-05 @ 10:23)      --------------------------------------------------------------------------------------------    LABS  CBC (01-09 @ 06:29)                              8.7<L>                         5.72    )----------------(  230        --    % Neutrophils, --    % Lymphocytes, ANC: --                                  25.2<L>    BMP (01-09 @ 06:29)             136     |  98      |  28<H> 		Ca++ --      Ca 8.9                ---------------------------------( 192<H>		Mg --                 4.4     |  30      |  1.75<H>			Ph --      BMP (01-08 @ 07:24)             140     |  99      |  31<H> 		Ca++ --      Ca 9.0                ---------------------------------( 100<H>		Mg --                 4.4     |  30      |  2.12<H>			Ph --        LFTs (01-09 @ 06:29)      TPro 7.3 / Alb 3.5 / TBili 0.4 / DBili -- / AST 38 / ALT 26 / AlkPhos 157<H>  LFTs (01-08 @ 07:24)      TPro 7.2 / Alb 3.1<L> / TBili 0.4 / DBili -- / AST 29 / ALT 23 / AlkPhos 164<H>  --------------------------------------------------------------------------------------------  MICROBIOLOGY  Urinalysis (01-09 @ 06:29):     Color:  / Appearance:  / SG:  / pH:  / Gluc: 192<H> / Ketones:  / Bili:  / Urobili:  / Protein : / Nitrites:  / Leuk.Est:  / RBC:  / WBC:  / Sq Epi:  / Non Sq Epi:  / Bacteria      -> .Blood Blood-Peripheral Culture (01-05 @ 12:40)     NG    NG  No growth at 72 Hours  -> .Blood Blood-Peripheral Culture (01-05 @ 12:30)     NG    NG  No growth at 72 Hours  --------------------------------------------------------------------------------------------  PHYSICAL EXAM:  GENERAL: NAD, well-developed   HEAD:  Atraumatic, Normocephalic  NECK: Supple, No JVD  CHEST/LUNG:  L breast tender 10'o clock, mild skin darkening change noted, no masses palpated, no abnormal nipple findings, no errythema seen, no discharge noted from breast. no cellulitic/asbcess changes identified  ABDOMEN: Soft, Nontender  PSYCH: AAOx3  NEUROLOGY: non-focal  --------------------------------------------------------------------------------------------  IMAGING:    OUTPATIENT MRI IMAGES AT [facility]  --------------------------------------------------------------------------------------------             BREAST SURGERY CONSULT NOTE  --------------------------------------------------------------------------------------------    HPI:   Patient is a 83y old  Female who presents with a chief complaint of Heart failure.    Per chart, pt is an 83 year old Female with PMH of Bradycardia s/p PPM, AFib on Eliquis, HTN, CKD, T2DM, presenting c/o SOB on exertion, cough w/ white sputum x2 wks. Also reports L-sided swelling (arm, breast and leg).  Had DVT, US of LUE and bilateral LLE, which were negative. Patient went to PCP complaining of heavy painful breast, in which she was sent for a mammogram of L breast which suggested ultrasound follow up for "inflammatory carcinoma is a possibility" Surgery consulted for further eval due to ultrasound unable to be performed.   Per radiology [facility] is not a breast center and the radiologists are unable to give results for a concern for malignancy. Patient would have to be transferred to another hospital that is a breast center.  WBC 5.72 today. She denies having any discharge from breast, denies nipple changes. Denies any previous abnormal MRI findings.  Daughter at bedside.     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 1000 milliLiter(s) IV Continuous <Continuous>  dextrose 5%. 1000 milliLiter(s) IV Continuous <Continuous>  dextrose 50% Injectable 12.5 Gram(s) IV Push once  dextrose 50% Injectable 25 Gram(s) IV Push once  dextrose 50% Injectable 25 Gram(s) IV Push once  ferrous    sulfate 325 milliGRAM(s) Oral daily  furosemide   Injectable 40 milliGRAM(s) IV Push daily  glucagon  Injectable 1 milliGRAM(s) IntraMuscular once  insulin glargine Injectable (LANTUS) 20 Unit(s) SubCutaneous every morning  insulin glargine Injectable (LANTUS) 10 Unit(s) SubCutaneous at bedtime  insulin lispro (ADMELOG) corrective regimen sliding scale   SubCutaneous three times a day before meals  insulin lispro (ADMELOG) corrective regimen sliding scale   SubCutaneous at bedtime  metoprolol succinate ER 25 milliGRAM(s) Oral daily  Nephro-patsy 1 Tablet(s) Oral daily    MEDICATIONS (PRN):acetaminophen     Tablet .. 650 milliGRAM(s) Oral every 6 hours PRN Temp greater or equal to 38.5C (101.3F), Mild Pain (1 - 3)  albuterol    90 MICROgram(s) HFA Inhaler 2 Puff(s) Inhalation every 6 hours PRN Shortness of Breath and/or Wheezing  benzonatate 100 milliGRAM(s) Oral every 8 hours PRN Cough  dextrose Oral Gel 15 Gram(s) Oral once PRN Blood Glucose LESS THAN 70 milliGRAM(s)/deciliter  guaiFENesin Oral Liquid (Sugar-Free) 100 milliGRAM(s) Oral every 6 hours PRN Cough    --------------------------------------------------------------------------------------------    Vitals:   T(C): 36.7 (01-09-24 @ 04:44), Max: 36.9 (01-08-24 @ 20:11)  HR: 70 (01-09-24 @ 04:44) (69 - 70)  BP: 131/62 (01-09-24 @ 04:44) (121/74 - 131/62)  RR: 18 (01-09-24 @ 04:44) (18 - 18)  SpO2: 96% (01-09-24 @ 04:44) (96% - 98%)  CAPILLARY BLOOD GLUCOSE      POCT Blood Glucose.: 213 mg/dL (09 Jan 2024 07:35)  POCT Blood Glucose.: 179 mg/dL (08 Jan 2024 21:14)  POCT Blood Glucose.: 192 mg/dL (08 Jan 2024 17:07)  POCT Blood Glucose.: 204 mg/dL (08 Jan 2024 11:48)      01-08 @ 07:01  -  01-09 @ 07:00  --------------------------------------------------------  IN:    Oral Fluid: 440 mL  Total IN: 440 mL    OUT:    Voided (mL): 2950 mL  Total OUT: 2950 mL  Total NET: -2510 mL    Height (cm): 165.1 (01-05 @ 10:23)  Weight (kg): 113.4 (01-05 @ 10:23)  BMI (kg/m2): 41.6 (01-05 @ 10:23)  BSA (m2): 2.17 (01-05 @ 10:23)      --------------------------------------------------------------------------------------------    LABS  CBC (01-09 @ 06:29)                              8.7<L>                         5.72    )----------------(  230        --    % Neutrophils, --    % Lymphocytes, ANC: --                                  25.2<L>    BMP (01-09 @ 06:29)             136     |  98      |  28<H> 		Ca++ --      Ca 8.9                ---------------------------------( 192<H>		Mg --                 4.4     |  30      |  1.75<H>			Ph --      BMP (01-08 @ 07:24)             140     |  99      |  31<H> 		Ca++ --      Ca 9.0                ---------------------------------( 100<H>		Mg --                 4.4     |  30      |  2.12<H>			Ph --        LFTs (01-09 @ 06:29)      TPro 7.3 / Alb 3.5 / TBili 0.4 / DBili -- / AST 38 / ALT 26 / AlkPhos 157<H>  LFTs (01-08 @ 07:24)      TPro 7.2 / Alb 3.1<L> / TBili 0.4 / DBili -- / AST 29 / ALT 23 / AlkPhos 164<H>  --------------------------------------------------------------------------------------------  MICROBIOLOGY  Urinalysis (01-09 @ 06:29):     Color:  / Appearance:  / SG:  / pH:  / Gluc: 192<H> / Ketones:  / Bili:  / Urobili:  / Protein : / Nitrites:  / Leuk.Est:  / RBC:  / WBC:  / Sq Epi:  / Non Sq Epi:  / Bacteria      -> .Blood Blood-Peripheral Culture (01-05 @ 12:40)     NG    NG  No growth at 72 Hours  -> .Blood Blood-Peripheral Culture (01-05 @ 12:30)     NG    NG  No growth at 72 Hours  --------------------------------------------------------------------------------------------  PHYSICAL EXAM:  GENERAL: NAD, well-developed   HEAD:  Atraumatic, Normocephalic  NECK: Supple, No JVD  CHEST/LUNG:  L breast tender 10'o clock, mild skin darkening change noted, no masses palpated, no abnormal nipple findings, no errythema seen, no discharge noted from breast. no cellulitic/asbcess changes identified  ABDOMEN: Soft, Nontender  PSYCH: AAOx3  NEUROLOGY: non-focal  --------------------------------------------------------------------------------------------  IMAGING:    OUTPATIENT MRI IMAGES AT [facility] RADIOLOGY  --------------------------------------------------------------------------------------------             BREAST SURGERY CONSULT NOTE  --------------------------------------------------------------------------------------------    HPI:   Patient is a 83y old  Female who presents with a chief complaint of Heart failure.    Per chart, pt is an 83 year old Female with PMH of Bradycardia s/p PPM, AFib on Eliquis, HTN, CKD, T2DM, presenting c/o SOB on exertion, cough w/ white sputum x2 wks. Also reports L-sided swelling (arm, breast and leg).  Had DVT, US of LUE and bilateral LLE, which were negative. Patient went to PCP complaining of heavy painful breast, in which she was sent for a mammogram of L breast which suggested ultrasound follow up for "inflammatory carcinoma is a possibility" Surgery consulted for further eval due to ultrasound unable to be performed.   Per radiology [facility] is not a breast center and the radiologists are unable to give results for a concern for malignancy. Patient would have to be transferred to another hospital that is a breast center.  WBC 5.72 today. She denies having any discharge from breast, denies nipple changes. Denies any previous abnormal MRI findings.  Daughter at bedside.     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 1000 milliLiter(s) IV Continuous <Continuous>  dextrose 5%. 1000 milliLiter(s) IV Continuous <Continuous>  dextrose 50% Injectable 12.5 Gram(s) IV Push once  dextrose 50% Injectable 25 Gram(s) IV Push once  dextrose 50% Injectable 25 Gram(s) IV Push once  ferrous    sulfate 325 milliGRAM(s) Oral daily  furosemide   Injectable 40 milliGRAM(s) IV Push daily  glucagon  Injectable 1 milliGRAM(s) IntraMuscular once  insulin glargine Injectable (LANTUS) 20 Unit(s) SubCutaneous every morning  insulin glargine Injectable (LANTUS) 10 Unit(s) SubCutaneous at bedtime  insulin lispro (ADMELOG) corrective regimen sliding scale   SubCutaneous three times a day before meals  insulin lispro (ADMELOG) corrective regimen sliding scale   SubCutaneous at bedtime  metoprolol succinate ER 25 milliGRAM(s) Oral daily  Nephro-patsy 1 Tablet(s) Oral daily    MEDICATIONS (PRN):acetaminophen     Tablet .. 650 milliGRAM(s) Oral every 6 hours PRN Temp greater or equal to 38.5C (101.3F), Mild Pain (1 - 3)  albuterol    90 MICROgram(s) HFA Inhaler 2 Puff(s) Inhalation every 6 hours PRN Shortness of Breath and/or Wheezing  benzonatate 100 milliGRAM(s) Oral every 8 hours PRN Cough  dextrose Oral Gel 15 Gram(s) Oral once PRN Blood Glucose LESS THAN 70 milliGRAM(s)/deciliter  guaiFENesin Oral Liquid (Sugar-Free) 100 milliGRAM(s) Oral every 6 hours PRN Cough    --------------------------------------------------------------------------------------------    Vitals:   T(C): 36.7 (01-09-24 @ 04:44), Max: 36.9 (01-08-24 @ 20:11)  HR: 70 (01-09-24 @ 04:44) (69 - 70)  BP: 131/62 (01-09-24 @ 04:44) (121/74 - 131/62)  RR: 18 (01-09-24 @ 04:44) (18 - 18)  SpO2: 96% (01-09-24 @ 04:44) (96% - 98%)  CAPILLARY BLOOD GLUCOSE      POCT Blood Glucose.: 213 mg/dL (09 Jan 2024 07:35)  POCT Blood Glucose.: 179 mg/dL (08 Jan 2024 21:14)  POCT Blood Glucose.: 192 mg/dL (08 Jan 2024 17:07)  POCT Blood Glucose.: 204 mg/dL (08 Jan 2024 11:48)      01-08 @ 07:01  -  01-09 @ 07:00  --------------------------------------------------------  IN:    Oral Fluid: 440 mL  Total IN: 440 mL    OUT:    Voided (mL): 2950 mL  Total OUT: 2950 mL  Total NET: -2510 mL    Height (cm): 165.1 (01-05 @ 10:23)  Weight (kg): 113.4 (01-05 @ 10:23)  BMI (kg/m2): 41.6 (01-05 @ 10:23)  BSA (m2): 2.17 (01-05 @ 10:23)      --------------------------------------------------------------------------------------------    LABS  CBC (01-09 @ 06:29)                              8.7<L>                         5.72    )----------------(  230        --    % Neutrophils, --    % Lymphocytes, ANC: --                                  25.2<L>    BMP (01-09 @ 06:29)             136     |  98      |  28<H> 		Ca++ --      Ca 8.9                ---------------------------------( 192<H>		Mg --                 4.4     |  30      |  1.75<H>			Ph --      BMP (01-08 @ 07:24)             140     |  99      |  31<H> 		Ca++ --      Ca 9.0                ---------------------------------( 100<H>		Mg --                 4.4     |  30      |  2.12<H>			Ph --        LFTs (01-09 @ 06:29)      TPro 7.3 / Alb 3.5 / TBili 0.4 / DBili -- / AST 38 / ALT 26 / AlkPhos 157<H>  LFTs (01-08 @ 07:24)      TPro 7.2 / Alb 3.1<L> / TBili 0.4 / DBili -- / AST 29 / ALT 23 / AlkPhos 164<H>  --------------------------------------------------------------------------------------------  MICROBIOLOGY  Urinalysis (01-09 @ 06:29):     Color:  / Appearance:  / SG:  / pH:  / Gluc: 192<H> / Ketones:  / Bili:  / Urobili:  / Protein : / Nitrites:  / Leuk.Est:  / RBC:  / WBC:  / Sq Epi:  / Non Sq Epi:  / Bacteria      -> .Blood Blood-Peripheral Culture (01-05 @ 12:40)     NG    NG  No growth at 72 Hours  -> .Blood Blood-Peripheral Culture (01-05 @ 12:30)     NG    NG  No growth at 72 Hours  --------------------------------------------------------------------------------------------  PHYSICAL EXAM:  GENERAL: NAD, well-developed   HEAD:  Atraumatic, Normocephalic  NECK: Supple, No JVD  CHEST/LUNG:  L breast tender 10'o clock, mild skin darkening change noted, no masses palpated, no abnormal nipple findings, no errythema seen, no discharge noted from breast. no cellulitic/asbcess changes identified  ABDOMEN: Soft, Nontender  PSYCH: AAOx3  NEUROLOGY: non-focal  --------------------------------------------------------------------------------------------  IMAGING:    OUTPATIENT MRI IMAGES AT [facility] RADIOLOGY  --------------------------------------------------------------------------------------------             BREAST SURGERY CONSULT NOTE  --------------------------------------------------------------------------------------------    HPI:   Patient is a 83y old  Female who presents with a chief complaint of Heart failure.    Per chart, pt is an 83 year old Female with PMH of Bradycardia s/p PPM, AFib on Eliquis, HTN, CKD, T2DM, presenting c/o SOB on exertion, cough w/ white sputum x2 wks. Also reports L-sided swelling (arm, breast and leg).  Had DVT, US of LUE and bilateral LLE, which were negative. Patient went to PCP complaining of heavy painful breast, in which she was sent for a mammogram of L breast which demonstrated skin thickening/edema and suggested ultrasound follow up for "inflammatory carcinoma is a possibility" Surgery consulted for further eval due to ultrasound unable to be performed.   Per radiology [facility] is not a breast center and the radiologists are unable to give results for a concern for malignancy. Patient would have to be transferred to another hospital that is a breast center.  WBC 5.72 today. She denies having any discharge from breast, denies nipple changes. Denies any previous abnormal MRI findings.  Daughter at bedside.     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 1000 milliLiter(s) IV Continuous <Continuous>  dextrose 5%. 1000 milliLiter(s) IV Continuous <Continuous>  dextrose 50% Injectable 12.5 Gram(s) IV Push once  dextrose 50% Injectable 25 Gram(s) IV Push once  dextrose 50% Injectable 25 Gram(s) IV Push once  ferrous    sulfate 325 milliGRAM(s) Oral daily  furosemide   Injectable 40 milliGRAM(s) IV Push daily  glucagon  Injectable 1 milliGRAM(s) IntraMuscular once  insulin glargine Injectable (LANTUS) 20 Unit(s) SubCutaneous every morning  insulin glargine Injectable (LANTUS) 10 Unit(s) SubCutaneous at bedtime  insulin lispro (ADMELOG) corrective regimen sliding scale   SubCutaneous three times a day before meals  insulin lispro (ADMELOG) corrective regimen sliding scale   SubCutaneous at bedtime  metoprolol succinate ER 25 milliGRAM(s) Oral daily  Nephro-patsy 1 Tablet(s) Oral daily    MEDICATIONS (PRN):acetaminophen     Tablet .. 650 milliGRAM(s) Oral every 6 hours PRN Temp greater or equal to 38.5C (101.3F), Mild Pain (1 - 3)  albuterol    90 MICROgram(s) HFA Inhaler 2 Puff(s) Inhalation every 6 hours PRN Shortness of Breath and/or Wheezing  benzonatate 100 milliGRAM(s) Oral every 8 hours PRN Cough  dextrose Oral Gel 15 Gram(s) Oral once PRN Blood Glucose LESS THAN 70 milliGRAM(s)/deciliter  guaiFENesin Oral Liquid (Sugar-Free) 100 milliGRAM(s) Oral every 6 hours PRN Cough    --------------------------------------------------------------------------------------------    Vitals:   T(C): 36.7 (01-09-24 @ 04:44), Max: 36.9 (01-08-24 @ 20:11)  HR: 70 (01-09-24 @ 04:44) (69 - 70)  BP: 131/62 (01-09-24 @ 04:44) (121/74 - 131/62)  RR: 18 (01-09-24 @ 04:44) (18 - 18)  SpO2: 96% (01-09-24 @ 04:44) (96% - 98%)  CAPILLARY BLOOD GLUCOSE      POCT Blood Glucose.: 213 mg/dL (09 Jan 2024 07:35)  POCT Blood Glucose.: 179 mg/dL (08 Jan 2024 21:14)  POCT Blood Glucose.: 192 mg/dL (08 Jan 2024 17:07)  POCT Blood Glucose.: 204 mg/dL (08 Jan 2024 11:48)      01-08 @ 07:01  -  01-09 @ 07:00  --------------------------------------------------------  IN:    Oral Fluid: 440 mL  Total IN: 440 mL    OUT:    Voided (mL): 2950 mL  Total OUT: 2950 mL  Total NET: -2510 mL    Height (cm): 165.1 (01-05 @ 10:23)  Weight (kg): 113.4 (01-05 @ 10:23)  BMI (kg/m2): 41.6 (01-05 @ 10:23)  BSA (m2): 2.17 (01-05 @ 10:23)      --------------------------------------------------------------------------------------------    LABS  CBC (01-09 @ 06:29)                              8.7<L>                         5.72    )----------------(  230        --    % Neutrophils, --    % Lymphocytes, ANC: --                                  25.2<L>    BMP (01-09 @ 06:29)             136     |  98      |  28<H> 		Ca++ --      Ca 8.9                ---------------------------------( 192<H>		Mg --                 4.4     |  30      |  1.75<H>			Ph --      BMP (01-08 @ 07:24)             140     |  99      |  31<H> 		Ca++ --      Ca 9.0                ---------------------------------( 100<H>		Mg --                 4.4     |  30      |  2.12<H>			Ph --        LFTs (01-09 @ 06:29)      TPro 7.3 / Alb 3.5 / TBili 0.4 / DBili -- / AST 38 / ALT 26 / AlkPhos 157<H>  LFTs (01-08 @ 07:24)      TPro 7.2 / Alb 3.1<L> / TBili 0.4 / DBili -- / AST 29 / ALT 23 / AlkPhos 164<H>  --------------------------------------------------------------------------------------------  MICROBIOLOGY  Urinalysis (01-09 @ 06:29):     Color:  / Appearance:  / SG:  / pH:  / Gluc: 192<H> / Ketones:  / Bili:  / Urobili:  / Protein : / Nitrites:  / Leuk.Est:  / RBC:  / WBC:  / Sq Epi:  / Non Sq Epi:  / Bacteria      -> .Blood Blood-Peripheral Culture (01-05 @ 12:40)     NG    NG  No growth at 72 Hours  -> .Blood Blood-Peripheral Culture (01-05 @ 12:30)     NG    NG  No growth at 72 Hours  --------------------------------------------------------------------------------------------  PHYSICAL EXAM:  GENERAL: NAD, well-developed   HEAD:  Atraumatic, Normocephalic  NECK: Supple, No JVD  CHEST/LUNG:  L breast tender 10'o clock, mild skin darkening change noted, no masses palpated, no abnormal nipple findings, no erythema seen, no discharge noted from breast. no cellulitic/asbcess changes identified.  No axillary lymphadenopathy.   ABDOMEN: Soft, Nontender  PSYCH: AAOx3  NEUROLOGY: non-focal  --------------------------------------------------------------------------------------------  IMAGING:    OUTPATIENT MRI IMAGES AT [facility]  --------------------------------------------------------------------------------------------             BREAST SURGERY CONSULT NOTE  --------------------------------------------------------------------------------------------    HPI:   Patient is a 83y old  Female who presents with a chief complaint of Heart failure.    Per chart, pt is an 83 year old Female with PMH of Bradycardia s/p PPM, AFib on Eliquis, HTN, CKD, T2DM, presenting c/o SOB on exertion, cough w/ white sputum x2 wks. Also reports L-sided swelling (arm, breast and leg).  Had DVT, US of LUE and bilateral LLE, which were negative. Patient went to PCP complaining of heavy painful breast, in which she was sent for a mammogram of L breast which demonstrated skin thickening/edema and suggested ultrasound follow up for "inflammatory carcinoma is a possibility" Surgery consulted for further eval due to ultrasound unable to be performed.   Per radiology [facility] is not a breast center and the radiologists are unable to give results for a concern for malignancy. Patient would have to be transferred to another hospital that is a breast center.  WBC 5.72 today. She denies having any discharge from breast, denies nipple changes. Denies any previous abnormal MRI findings.  Daughter at bedside.     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 1000 milliLiter(s) IV Continuous <Continuous>  dextrose 5%. 1000 milliLiter(s) IV Continuous <Continuous>  dextrose 50% Injectable 12.5 Gram(s) IV Push once  dextrose 50% Injectable 25 Gram(s) IV Push once  dextrose 50% Injectable 25 Gram(s) IV Push once  ferrous    sulfate 325 milliGRAM(s) Oral daily  furosemide   Injectable 40 milliGRAM(s) IV Push daily  glucagon  Injectable 1 milliGRAM(s) IntraMuscular once  insulin glargine Injectable (LANTUS) 20 Unit(s) SubCutaneous every morning  insulin glargine Injectable (LANTUS) 10 Unit(s) SubCutaneous at bedtime  insulin lispro (ADMELOG) corrective regimen sliding scale   SubCutaneous three times a day before meals  insulin lispro (ADMELOG) corrective regimen sliding scale   SubCutaneous at bedtime  metoprolol succinate ER 25 milliGRAM(s) Oral daily  Nephro-patsy 1 Tablet(s) Oral daily    MEDICATIONS (PRN):acetaminophen     Tablet .. 650 milliGRAM(s) Oral every 6 hours PRN Temp greater or equal to 38.5C (101.3F), Mild Pain (1 - 3)  albuterol    90 MICROgram(s) HFA Inhaler 2 Puff(s) Inhalation every 6 hours PRN Shortness of Breath and/or Wheezing  benzonatate 100 milliGRAM(s) Oral every 8 hours PRN Cough  dextrose Oral Gel 15 Gram(s) Oral once PRN Blood Glucose LESS THAN 70 milliGRAM(s)/deciliter  guaiFENesin Oral Liquid (Sugar-Free) 100 milliGRAM(s) Oral every 6 hours PRN Cough    --------------------------------------------------------------------------------------------    Vitals:   T(C): 36.7 (01-09-24 @ 04:44), Max: 36.9 (01-08-24 @ 20:11)  HR: 70 (01-09-24 @ 04:44) (69 - 70)  BP: 131/62 (01-09-24 @ 04:44) (121/74 - 131/62)  RR: 18 (01-09-24 @ 04:44) (18 - 18)  SpO2: 96% (01-09-24 @ 04:44) (96% - 98%)  CAPILLARY BLOOD GLUCOSE      POCT Blood Glucose.: 213 mg/dL (09 Jan 2024 07:35)  POCT Blood Glucose.: 179 mg/dL (08 Jan 2024 21:14)  POCT Blood Glucose.: 192 mg/dL (08 Jan 2024 17:07)  POCT Blood Glucose.: 204 mg/dL (08 Jan 2024 11:48)      01-08 @ 07:01  -  01-09 @ 07:00  --------------------------------------------------------  IN:    Oral Fluid: 440 mL  Total IN: 440 mL    OUT:    Voided (mL): 2950 mL  Total OUT: 2950 mL  Total NET: -2510 mL    Height (cm): 165.1 (01-05 @ 10:23)  Weight (kg): 113.4 (01-05 @ 10:23)  BMI (kg/m2): 41.6 (01-05 @ 10:23)  BSA (m2): 2.17 (01-05 @ 10:23)      --------------------------------------------------------------------------------------------    LABS  CBC (01-09 @ 06:29)                              8.7<L>                         5.72    )----------------(  230        --    % Neutrophils, --    % Lymphocytes, ANC: --                                  25.2<L>    BMP (01-09 @ 06:29)             136     |  98      |  28<H> 		Ca++ --      Ca 8.9                ---------------------------------( 192<H>		Mg --                 4.4     |  30      |  1.75<H>			Ph --      BMP (01-08 @ 07:24)             140     |  99      |  31<H> 		Ca++ --      Ca 9.0                ---------------------------------( 100<H>		Mg --                 4.4     |  30      |  2.12<H>			Ph --        LFTs (01-09 @ 06:29)      TPro 7.3 / Alb 3.5 / TBili 0.4 / DBili -- / AST 38 / ALT 26 / AlkPhos 157<H>  LFTs (01-08 @ 07:24)      TPro 7.2 / Alb 3.1<L> / TBili 0.4 / DBili -- / AST 29 / ALT 23 / AlkPhos 164<H>  --------------------------------------------------------------------------------------------  MICROBIOLOGY  Urinalysis (01-09 @ 06:29):     Color:  / Appearance:  / SG:  / pH:  / Gluc: 192<H> / Ketones:  / Bili:  / Urobili:  / Protein : / Nitrites:  / Leuk.Est:  / RBC:  / WBC:  / Sq Epi:  / Non Sq Epi:  / Bacteria      -> .Blood Blood-Peripheral Culture (01-05 @ 12:40)     NG    NG  No growth at 72 Hours  -> .Blood Blood-Peripheral Culture (01-05 @ 12:30)     NG    NG  No growth at 72 Hours  --------------------------------------------------------------------------------------------  PHYSICAL EXAM:  GENERAL: NAD, well-developed   HEAD:  Atraumatic, Normocephalic  NECK: Supple, No JVD  CHEST/LUNG:  L breast tender 10'o clock, mild skin darkening change noted, no masses palpated, no abnormal nipple findings, no erythema seen, no discharge noted from breast. no cellulitic/asbcess changes identified.  No axillary lymphadenopathy.   ABDOMEN: Soft, Nontender  PSYCH: AAOx3  NEUROLOGY: non-focal  --------------------------------------------------------------------------------------------  IMAGING:    OUTPATIENT MRI IMAGES AT [facility]  --------------------------------------------------------------------------------------------

## 2024-01-09 NOTE — PROCEDURE NOTE - INTERROGATION NOTE: UNDERLYING RHYTHM
Atrial tachycardia with controlled ventricular rate (atrial rate 196-200 bpm, ventricular rate 50-60's)

## 2024-01-09 NOTE — CONSULT NOTE ADULT - ATTENDING COMMENTS
Enlargement noted of the left breast. Suspect edema > breast cancer infiltration to the skin. A skin biopsy may be performed to assess for breast cancer, though the yield will likely be very low. May also consider clinical observation as the edema as per the patient and her daughter seems to be improving significantly. Discussed both options with the patient and her daughter, and they are opting for clinical observation for now. If a biopsy is desired in the future, please reconsult dermatology.    Dominic Pozo MD, PharmD, MPH  Co-Director, Inpatient Dermatology Consultation Service, [facility] Enlargement noted of the left breast. Suspect edema > breast cancer infiltration to the skin. A skin biopsy may be performed to assess for breast cancer, though the yield will likely be very low. May also consider clinical observation as the edema as per the patient and her daughter seems to be improving significantly. Discussed both options with the patient and her daughter, and they are opting for clinical observation for now. If a biopsy is desired in the future, please reconsult dermatology.    Dominic Pozo MD, PharmD, MPH  Co-Director, Inpatient Dermatology Consultation Service, [facility]

## 2024-01-09 NOTE — DISCHARGE NOTE PROVIDER - NSDCCPCAREPLAN_GEN_ALL_CORE_FT
PRINCIPAL DISCHARGE DIAGNOSIS  Diagnosis: Acute CHF  Assessment and Plan of Treatment: You completed a course of IV lasix for diuresis per cardiology and nephrology recommendations  Echocardiograsm with ejection fraction 60-65%  Continue oral lasix 40 mg daily  Please follow-up with your primary care physician within one week of discharge.  Weigh yourself daily.  If you gain 3lbs in 3 days, or 5lbs in a week call your Health Care Provider.  Do not eat or drink foods containing more than 2000mg of salt (sodium) in your diet every day.  Call your Health Care Provider if you have any swelling or increased swelling in your feet, ankles, and/or stomach.  Take all of your medication as directed.  If you become dizzy call your Health Care Provider.        SECONDARY DISCHARGE DIAGNOSES  Diagnosis: Anemia  Assessment and Plan of Treatment: Fecal occul negative. Eliquis was briefly held.  Iron studies show iron deficient anemia  Start iron supplements    Diagnosis: Mastitis  Assessment and Plan of Treatment: Please use hydrocortisone 2.5% cream twice a day to affected area for maximum of 2 weeks, then discontinue use as recommended by dermatology.    Diagnosis: H/O abnormal mammogram  Assessment and Plan of Treatment: Please follow-up with surgery and PCP  Elevated left breast with supportive bra and pillow support.   Sleep on back or right side.  Avoid sleeping on the left side.    Ultrasound of left breast without abscess    Diagnosis: Chronic atrial fibrillation  Assessment and Plan of Treatment: Pacemaker shows peristent atrial tachycardia since 1/5 as per interrogation performed on 1/9 by EP. Dr. Lora resumed eliquis and started amiodarone. Follow-up outpatient with Dr. Lora.     PRINCIPAL DISCHARGE DIAGNOSIS  Diagnosis: Acute CHF  Assessment and Plan of Treatment: You completed a course of IV lasix for diuresis per cardiology and nephrology recommendations  Echocardiograsm with ejection fraction 60-65%  Continue oral lasix 40 mg daily  Please follow-up with your primary care physician within one week of discharge.  Weigh yourself daily.  If you gain 3lbs in 3 days, or 5lbs in a week call your Health Care Provider.  Do not eat or drink foods containing more than 2000mg of salt (sodium) in your diet every day.  Call your Health Care Provider if you have any swelling or increased swelling in your feet, ankles, and/or stomach.  Take all of your medication as directed.  If you become dizzy call your Health Care Provider.        SECONDARY DISCHARGE DIAGNOSES  Diagnosis: Anemia  Assessment and Plan of Treatment: Fecal occult negative. Eliquis was briefly held.  Iron studies show iron deficient anemia.  Start iron supplements.    Diagnosis: Mastitis  Assessment and Plan of Treatment: Please use hydrocortisone 2.5% cream twice a day to affected area for maximum of 2 weeks, then discontinue use as recommended by dermatology.    Diagnosis: H/O abnormal mammogram  Assessment and Plan of Treatment: Please follow-up with surgery and PCP  Elevated left breast with supportive bra and pillow support.   Sleep on back or right side.  Avoid sleeping on the left side.    Ultrasound of left breast without abscess    Diagnosis: Chronic atrial fibrillation  Assessment and Plan of Treatment: Pacemaker shows peristent atrial tachycardia since 1/5 as per interrogation performed on 1/9 by EP. Dr. Lora resumed eliquis and started amiodarone. Follow-up outpatient with Dr. Lora.     PRINCIPAL DISCHARGE DIAGNOSIS  Diagnosis: Acute CHF  Assessment and Plan of Treatment: You completed a course of IV lasix for diuresis per cardiology and nephrology recommendations  Echocardiograsm with ejection fraction 60-65%  Continue oral lasix 40 mg daily  Please follow-up with your primary care physician within one week of discharge.  Weigh yourself daily.  If you gain 3lbs in 3 days, or 5lbs in a week call your Health Care Provider.  Do not eat or drink foods containing more than 2000mg of salt (sodium) in your diet every day.  Call your Health Care Provider if you have any swelling or increased swelling in your feet, ankles, and/or stomach.  Take all of your medication as directed.  If you become dizzy call your Health Care Provider.        SECONDARY DISCHARGE DIAGNOSES  Diagnosis: Anemia  Assessment and Plan of Treatment: Fecal occult negative. Eliquis was briefly held.  Iron studies show iron deficient anemia.  Start iron supplements.    Diagnosis: Mastitis  Assessment and Plan of Treatment: Please use hydrocortisone 2.5% cream twice a day to affected area for maximum of 2 weeks, then discontinue use as recommended by dermatology.    Diagnosis: H/O abnormal mammogram  Assessment and Plan of Treatment: Please follow-up with surgery and PCP  Elevated left breast with supportive bra and pillow support.   Sleep on back or right side.  Avoid sleeping on the left side.    Ultrasound of left breast without abscess    Diagnosis: Chronic atrial fibrillation  Assessment and Plan of Treatment: Pacemaker shows peristent atrial tachycardia since 1/5 as per interrogation performed on 1/9 by EP. Dr. Lora resumed eliquis and started amiodarone. Follow-up outpatient with Dr. Lora.    Diagnosis: Slurred speech  Assessment and Plan of Treatment: Your discharge was held on Jan 11 due to slurred speech and facial droop  CTH negative for acute pathology  Carotid duplex performed-   No hemodynamically significant right internal carotid artery stenosis.  Velocity doubling of the proximal left ICA compared to distal CCA,   associated with mixed plaque at the left bulb, suggests approximately 50% stenosis.Waveforms demonstrate arrhythmia, which may affect velocity measurements.  Measurement of carotid stenosis is based on velocity parameters that   correlate the residual internal carotid diameter with that of the more   distal vessel in accordance with a method such as the [place] American   Symptomatic Carotid Endarterectomy Trial (NASCET).  Follow up with PCP and cardiology

## 2024-01-10 LAB
ALBUMIN SERPL ELPH-MCNC: 3.5 G/DL — SIGNIFICANT CHANGE UP (ref 3.3–5)
ALBUMIN SERPL ELPH-MCNC: 3.5 G/DL — SIGNIFICANT CHANGE UP (ref 3.3–5)
ALP SERPL-CCNC: 147 U/L — HIGH (ref 40–120)
ALP SERPL-CCNC: 147 U/L — HIGH (ref 40–120)
ALT FLD-CCNC: <29 U/L — SIGNIFICANT CHANGE UP (ref 10–45)
ALT FLD-CCNC: <29 U/L — SIGNIFICANT CHANGE UP (ref 10–45)
ANION GAP SERPL CALC-SCNC: 10 MMOL/L — SIGNIFICANT CHANGE UP (ref 5–17)
ANION GAP SERPL CALC-SCNC: 10 MMOL/L — SIGNIFICANT CHANGE UP (ref 5–17)
AST SERPL-CCNC: 51 U/L — HIGH (ref 10–40)
AST SERPL-CCNC: 51 U/L — HIGH (ref 10–40)
BILIRUB SERPL-MCNC: 0.6 MG/DL — SIGNIFICANT CHANGE UP (ref 0.2–1.2)
BILIRUB SERPL-MCNC: 0.6 MG/DL — SIGNIFICANT CHANGE UP (ref 0.2–1.2)
BUN SERPL-MCNC: 24 MG/DL — HIGH (ref 7–23)
BUN SERPL-MCNC: 24 MG/DL — HIGH (ref 7–23)
CALCIUM SERPL-MCNC: 9.3 MG/DL — SIGNIFICANT CHANGE UP (ref 8.4–10.5)
CALCIUM SERPL-MCNC: 9.3 MG/DL — SIGNIFICANT CHANGE UP (ref 8.4–10.5)
CHLORIDE SERPL-SCNC: 98 MMOL/L — SIGNIFICANT CHANGE UP (ref 96–108)
CHLORIDE SERPL-SCNC: 98 MMOL/L — SIGNIFICANT CHANGE UP (ref 96–108)
CO2 SERPL-SCNC: 28 MMOL/L — SIGNIFICANT CHANGE UP (ref 22–31)
CO2 SERPL-SCNC: 28 MMOL/L — SIGNIFICANT CHANGE UP (ref 22–31)
CREAT SERPL-MCNC: 1.58 MG/DL — HIGH (ref 0.5–1.3)
CREAT SERPL-MCNC: 1.58 MG/DL — HIGH (ref 0.5–1.3)
CULTURE RESULTS: SIGNIFICANT CHANGE UP
EGFR: 32 ML/MIN/1.73M2 — LOW
EGFR: 32 ML/MIN/1.73M2 — LOW
GLUCOSE BLDC GLUCOMTR-MCNC: 113 MG/DL — HIGH (ref 70–99)
GLUCOSE BLDC GLUCOMTR-MCNC: 113 MG/DL — HIGH (ref 70–99)
GLUCOSE BLDC GLUCOMTR-MCNC: 136 MG/DL — HIGH (ref 70–99)
GLUCOSE BLDC GLUCOMTR-MCNC: 136 MG/DL — HIGH (ref 70–99)
GLUCOSE BLDC GLUCOMTR-MCNC: 145 MG/DL — HIGH (ref 70–99)
GLUCOSE BLDC GLUCOMTR-MCNC: 145 MG/DL — HIGH (ref 70–99)
GLUCOSE BLDC GLUCOMTR-MCNC: 160 MG/DL — HIGH (ref 70–99)
GLUCOSE BLDC GLUCOMTR-MCNC: 160 MG/DL — HIGH (ref 70–99)
GLUCOSE BLDC GLUCOMTR-MCNC: 170 MG/DL — HIGH (ref 70–99)
GLUCOSE BLDC GLUCOMTR-MCNC: 170 MG/DL — HIGH (ref 70–99)
GLUCOSE SERPL-MCNC: 109 MG/DL — HIGH (ref 70–99)
GLUCOSE SERPL-MCNC: 109 MG/DL — HIGH (ref 70–99)
HCT VFR BLD CALC: 26.6 % — LOW (ref 34.5–45)
HCT VFR BLD CALC: 26.6 % — LOW (ref 34.5–45)
HGB BLD-MCNC: 9.2 G/DL — LOW (ref 11.5–15.5)
HGB BLD-MCNC: 9.2 G/DL — LOW (ref 11.5–15.5)
MCHC RBC-ENTMCNC: 27.5 PG — SIGNIFICANT CHANGE UP (ref 27–34)
MCHC RBC-ENTMCNC: 27.5 PG — SIGNIFICANT CHANGE UP (ref 27–34)
MCHC RBC-ENTMCNC: 34.6 GM/DL — SIGNIFICANT CHANGE UP (ref 32–36)
MCHC RBC-ENTMCNC: 34.6 GM/DL — SIGNIFICANT CHANGE UP (ref 32–36)
MCV RBC AUTO: 79.6 FL — LOW (ref 80–100)
MCV RBC AUTO: 79.6 FL — LOW (ref 80–100)
NRBC # BLD: 0 /100 WBCS — SIGNIFICANT CHANGE UP (ref 0–0)
NRBC # BLD: 0 /100 WBCS — SIGNIFICANT CHANGE UP (ref 0–0)
PLATELET # BLD AUTO: 226 K/UL — SIGNIFICANT CHANGE UP (ref 150–400)
PLATELET # BLD AUTO: 226 K/UL — SIGNIFICANT CHANGE UP (ref 150–400)
POTASSIUM SERPL-MCNC: 6.3 MMOL/L — CRITICAL HIGH (ref 3.5–5.3)
POTASSIUM SERPL-MCNC: 6.3 MMOL/L — CRITICAL HIGH (ref 3.5–5.3)
POTASSIUM SERPL-SCNC: 6.3 MMOL/L — CRITICAL HIGH (ref 3.5–5.3)
POTASSIUM SERPL-SCNC: 6.3 MMOL/L — CRITICAL HIGH (ref 3.5–5.3)
PROT SERPL-MCNC: 7.6 G/DL — SIGNIFICANT CHANGE UP (ref 6–8.3)
PROT SERPL-MCNC: 7.6 G/DL — SIGNIFICANT CHANGE UP (ref 6–8.3)
RBC # BLD: 3.34 M/UL — LOW (ref 3.8–5.2)
RBC # BLD: 3.34 M/UL — LOW (ref 3.8–5.2)
RBC # FLD: 15.1 % — HIGH (ref 10.3–14.5)
RBC # FLD: 15.1 % — HIGH (ref 10.3–14.5)
SODIUM SERPL-SCNC: 136 MMOL/L — SIGNIFICANT CHANGE UP (ref 135–145)
SODIUM SERPL-SCNC: 136 MMOL/L — SIGNIFICANT CHANGE UP (ref 135–145)
SPECIMEN SOURCE: SIGNIFICANT CHANGE UP
WBC # BLD: 4.64 K/UL — SIGNIFICANT CHANGE UP (ref 3.8–10.5)
WBC # BLD: 4.64 K/UL — SIGNIFICANT CHANGE UP (ref 3.8–10.5)
WBC # FLD AUTO: 4.64 K/UL — SIGNIFICANT CHANGE UP (ref 3.8–10.5)
WBC # FLD AUTO: 4.64 K/UL — SIGNIFICANT CHANGE UP (ref 3.8–10.5)

## 2024-01-10 PROCEDURE — 76642 ULTRASOUND BREAST LIMITED: CPT | Mod: 26,LT

## 2024-01-10 RX ORDER — AMIODARONE HYDROCHLORIDE 400 MG/1
TABLET ORAL
Refills: 0 | Status: DISCONTINUED | OUTPATIENT
Start: 2024-01-10 | End: 2024-01-13

## 2024-01-10 RX ORDER — FUROSEMIDE 40 MG
40 TABLET ORAL DAILY
Refills: 0 | Status: DISCONTINUED | OUTPATIENT
Start: 2024-01-11 | End: 2024-01-13

## 2024-01-10 RX ORDER — AMIODARONE HYDROCHLORIDE 400 MG/1
400 TABLET ORAL EVERY 8 HOURS
Refills: 0 | Status: DISCONTINUED | OUTPATIENT
Start: 2024-01-10 | End: 2024-01-13

## 2024-01-10 RX ORDER — AMIODARONE HYDROCHLORIDE 400 MG/1
200 TABLET ORAL DAILY
Refills: 0 | Status: DISCONTINUED | OUTPATIENT
Start: 2024-01-14 | End: 2024-01-13

## 2024-01-10 RX ORDER — APIXABAN 2.5 MG/1
2.5 TABLET, FILM COATED ORAL
Refills: 0 | Status: DISCONTINUED | OUTPATIENT
Start: 2024-01-10 | End: 2024-01-13

## 2024-01-10 RX ADMIN — Medication 1 TABLET(S): at 13:02

## 2024-01-10 RX ADMIN — AMIODARONE HYDROCHLORIDE 400 MILLIGRAM(S): 400 TABLET ORAL at 14:20

## 2024-01-10 RX ADMIN — CHLORHEXIDINE GLUCONATE 1 APPLICATION(S): 213 SOLUTION TOPICAL at 13:02

## 2024-01-10 RX ADMIN — AMIODARONE HYDROCHLORIDE 400 MILLIGRAM(S): 400 TABLET ORAL at 22:08

## 2024-01-10 RX ADMIN — INSULIN GLARGINE 20 UNIT(S): 100 INJECTION, SOLUTION SUBCUTANEOUS at 10:14

## 2024-01-10 RX ADMIN — Medication 40 MILLIGRAM(S): at 05:25

## 2024-01-10 RX ADMIN — Medication 325 MILLIGRAM(S): at 13:02

## 2024-01-10 RX ADMIN — Medication 650 MILLIGRAM(S): at 22:11

## 2024-01-10 RX ADMIN — BRIMONIDINE TARTRATE 1 DROP(S): 2 SOLUTION/ DROPS OPHTHALMIC at 13:01

## 2024-01-10 RX ADMIN — APIXABAN 2.5 MILLIGRAM(S): 2.5 TABLET, FILM COATED ORAL at 17:56

## 2024-01-10 RX ADMIN — Medication 1 APPLICATION(S): at 13:03

## 2024-01-10 RX ADMIN — Medication 1 APPLICATION(S): at 17:56

## 2024-01-10 RX ADMIN — ATORVASTATIN CALCIUM 40 MILLIGRAM(S): 80 TABLET, FILM COATED ORAL at 22:09

## 2024-01-10 RX ADMIN — Medication 650 MILLIGRAM(S): at 23:11

## 2024-01-10 RX ADMIN — Medication 1: at 17:54

## 2024-01-10 RX ADMIN — Medication 25 MILLIGRAM(S): at 05:25

## 2024-01-10 RX ADMIN — Medication 100 MILLIGRAM(S): at 14:18

## 2024-01-10 RX ADMIN — INSULIN GLARGINE 10 UNIT(S): 100 INJECTION, SOLUTION SUBCUTANEOUS at 22:08

## 2024-01-10 NOTE — PROGRESS NOTE ADULT - SUBJECTIVE AND OBJECTIVE BOX
Patient is a 83y old  Female who presents with a chief complaint of Heart failure    Per chart, pt is an 83 year old Female with PMH of Bradycardia s/p PPM, AFib on Eliquis, HTN, CKD, T2DM, presenting c/o SOB on exertion, cough w/ white sputum x2 wks. Also reports L-sided swelling (arm, breast and leg).  Had DVT, US of LUE and bilateral LLE, which were negative. 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    (08 Jan 2024 11:18)      SUBJECTIVE / OVERNIGHT EVENTS: Comfortable without new complaints. Daughter at bedside.  Review of Systems  chest pain no  palpitations no  sob improving   nausea no  headache no    MEDICATIONS  (STANDING):  aMIOdarone    Tablet 400 milliGRAM(s) Oral every 8 hours  aMIOdarone    Tablet   Oral   apixaban 2.5 milliGRAM(s) Oral two times a day  atorvastatin 40 milliGRAM(s) Oral at bedtime  brimonidine 0.2% Ophthalmic Solution 1 Drop(s) Both EYES daily  chlorhexidine 2% Cloths 1 Application(s) Topical daily  dextrose 5%. 1000 milliLiter(s) (100 mL/Hr) IV Continuous <Continuous>  dextrose 5%. 1000 milliLiter(s) (50 mL/Hr) IV Continuous <Continuous>  dextrose 50% Injectable 25 Gram(s) IV Push once  dextrose 50% Injectable 12.5 Gram(s) IV Push once  dextrose 50% Injectable 25 Gram(s) IV Push once  ferrous    sulfate 325 milliGRAM(s) Oral daily  glucagon  Injectable 1 milliGRAM(s) IntraMuscular once  hydrocortisone 2.5% Ointment 1 Application(s) Topical two times a day  insulin glargine Injectable (LANTUS) 10 Unit(s) SubCutaneous at bedtime  insulin glargine Injectable (LANTUS) 20 Unit(s) SubCutaneous every morning  insulin lispro (ADMELOG) corrective regimen sliding scale   SubCutaneous three times a day before meals  insulin lispro (ADMELOG) corrective regimen sliding scale   SubCutaneous at bedtime  metoprolol succinate ER 25 milliGRAM(s) Oral daily  Nephro-patsy 1 Tablet(s) Oral daily    MEDICATIONS  (PRN):  acetaminophen     Tablet .. 650 milliGRAM(s) Oral every 6 hours PRN Temp greater or equal to 38.5C (101.3F), Mild Pain (1 - 3)  albuterol    90 MICROgram(s) HFA Inhaler 2 Puff(s) Inhalation every 6 hours PRN Shortness of Breath and/or Wheezing  benzonatate 100 milliGRAM(s) Oral every 8 hours PRN Cough  dextrose Oral Gel 15 Gram(s) Oral once PRN Blood Glucose LESS THAN 70 milliGRAM(s)/deciliter  guaiFENesin Oral Liquid (Sugar-Free) 100 milliGRAM(s) Oral every 6 hours PRN Cough      Vital Signs Last 24 Hrs  T(C): 36.6 (10 Darrikc 2024 11:08), Max: 37.5 (09 Jan 2024 20:45)  T(F): 97.9 (10 Darrick 2024 11:08), Max: 99.5 (09 Jan 2024 20:45)  HR: 73 (10 Darrick 2024 16:51) (60 - 73)  BP: 160/79 (10 Darrick 2024 16:51) (126/79 - 166/85)  BP(mean): --  RR: 18 (10 Darrick 2024 11:08) (18 - 18)  SpO2: 68% (10 Darrick 2024 11:08) (68% - 97%)    Parameters below as of 10 Darrick 2024 11:08  Patient On (Oxygen Delivery Method): room air        PHYSICAL EXAM:  GENERAL: NAD, well-developed  HEAD:  Atraumatic, Normocephalic  EYES: EOMI, PERRLA, conjunctiva and sclera clear  NECK: Supple, No JVD  CHEST/LUNG: Clear to auscultation bilaterally; No wheeze L breast less tender + skin erythema   HEART: Regular rate and rhythm; No murmurs, rubs, or gallops  ABDOMEN: Soft, Nontender, Nondistended; Bowel sounds present  EXTREMITIES:  2+ Peripheral Pulses, No clubbing, cyanosis, or edema  PSYCH: AAOx3  NEUROLOGY: non-focal  SKIN: No rashes or lesions    LABS:                        9.2    4.64  )-----------( 226      ( 10 Darrick 2024 06:31 )             26.6     01-10    136  |  98  |  24<H>  ----------------------------<  109<H>  6.3<HH>   |  28  |  1.58<H>    Ca    9.3      10 Darrick 2024 06:23    TPro  7.6  /  Alb  3.5  /  TBili  0.6  /  DBili  x   /  AST  51<H>  /  ALT  <29  /  AlkPhos  147<H>  01-10          Urinalysis Basic - ( 10 Darrick 2024 06:23 )    Color: x / Appearance: x / SG: x / pH: x  Gluc: 109 mg/dL / Ketone: x  / Bili: x / Urobili: x   Blood: x / Protein: x / Nitrite: x   Leuk Esterase: x / RBC: x / WBC x   Sq Epi: x / Non Sq Epi: x / Bacteria: x          RADIOLOGY & ADDITIONAL TESTS:    Imaging Personally Reviewed:  < from: US Breast Limited, Left (01.10.24 @ 08:54) >  IMPRESSION:  No sonographic evidence of breast abscess.    Clinical management of mastitis is recommended.    If symptoms do not resolve clinically, additional imaging in a dedicated   breast imaging center should be performed to exclude the possibility of   malignancy.    < end of copied text >    Consultant(s) Notes Reviewed:      Care Discussed with Consultants/Other Providers:   Patient is a 83y old  Female who presents with a chief complaint of Heart failure    Per chart, pt is an 83 year old Female with PMH of Bradycardia s/p PPM, AFib on Eliquis, HTN, CKD, T2DM, presenting c/o SOB on exertion, cough w/ white sputum x2 wks. Also reports L-sided swelling (arm, breast and leg).  Had DVT, US of LUE and bilateral LLE, which were negative. 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    (08 Jan 2024 11:18)      SUBJECTIVE / OVERNIGHT EVENTS: Comfortable without new complaints. Daughter at bedside.  Review of Systems  chest pain no  palpitations no  sob improving   nausea no  headache no    MEDICATIONS  (STANDING):  aMIOdarone    Tablet 400 milliGRAM(s) Oral every 8 hours  aMIOdarone    Tablet   Oral   apixaban 2.5 milliGRAM(s) Oral two times a day  atorvastatin 40 milliGRAM(s) Oral at bedtime  brimonidine 0.2% Ophthalmic Solution 1 Drop(s) Both EYES daily  chlorhexidine 2% Cloths 1 Application(s) Topical daily  dextrose 5%. 1000 milliLiter(s) (100 mL/Hr) IV Continuous <Continuous>  dextrose 5%. 1000 milliLiter(s) (50 mL/Hr) IV Continuous <Continuous>  dextrose 50% Injectable 25 Gram(s) IV Push once  dextrose 50% Injectable 12.5 Gram(s) IV Push once  dextrose 50% Injectable 25 Gram(s) IV Push once  ferrous    sulfate 325 milliGRAM(s) Oral daily  glucagon  Injectable 1 milliGRAM(s) IntraMuscular once  hydrocortisone 2.5% Ointment 1 Application(s) Topical two times a day  insulin glargine Injectable (LANTUS) 10 Unit(s) SubCutaneous at bedtime  insulin glargine Injectable (LANTUS) 20 Unit(s) SubCutaneous every morning  insulin lispro (ADMELOG) corrective regimen sliding scale   SubCutaneous three times a day before meals  insulin lispro (ADMELOG) corrective regimen sliding scale   SubCutaneous at bedtime  metoprolol succinate ER 25 milliGRAM(s) Oral daily  Nephro-patsy 1 Tablet(s) Oral daily    MEDICATIONS  (PRN):  acetaminophen     Tablet .. 650 milliGRAM(s) Oral every 6 hours PRN Temp greater or equal to 38.5C (101.3F), Mild Pain (1 - 3)  albuterol    90 MICROgram(s) HFA Inhaler 2 Puff(s) Inhalation every 6 hours PRN Shortness of Breath and/or Wheezing  benzonatate 100 milliGRAM(s) Oral every 8 hours PRN Cough  dextrose Oral Gel 15 Gram(s) Oral once PRN Blood Glucose LESS THAN 70 milliGRAM(s)/deciliter  guaiFENesin Oral Liquid (Sugar-Free) 100 milliGRAM(s) Oral every 6 hours PRN Cough      Vital Signs Last 24 Hrs  T(C): 36.6 (10 Darrick 2024 11:08), Max: 37.5 (09 Jan 2024 20:45)  T(F): 97.9 (10 Darrick 2024 11:08), Max: 99.5 (09 Jan 2024 20:45)  HR: 73 (10 Darrick 2024 16:51) (60 - 73)  BP: 160/79 (10 Darrick 2024 16:51) (126/79 - 166/85)  BP(mean): --  RR: 18 (10 Darrick 2024 11:08) (18 - 18)  SpO2: 68% (10 Darrick 2024 11:08) (68% - 97%)    Parameters below as of 10 Darrick 2024 11:08  Patient On (Oxygen Delivery Method): room air        PHYSICAL EXAM:  GENERAL: NAD, well-developed  HEAD:  Atraumatic, Normocephalic  EYES: EOMI, PERRLA, conjunctiva and sclera clear  NECK: Supple, No JVD  CHEST/LUNG: Clear to auscultation bilaterally; No wheeze L breast less tender + skin erythema   HEART: Regular rate and rhythm; No murmurs, rubs, or gallops  ABDOMEN: Soft, Nontender, Nondistended; Bowel sounds present  EXTREMITIES:  2+ Peripheral Pulses, No clubbing, cyanosis, or edema  PSYCH: AAOx3  NEUROLOGY: non-focal  SKIN: No rashes or lesions    LABS:                        9.2    4.64  )-----------( 226      ( 10 Darrick 2024 06:31 )             26.6     01-10    136  |  98  |  24<H>  ----------------------------<  109<H>  6.3<HH>   |  28  |  1.58<H>    Ca    9.3      10 Darrick 2024 06:23    TPro  7.6  /  Alb  3.5  /  TBili  0.6  /  DBili  x   /  AST  51<H>  /  ALT  <29  /  AlkPhos  147<H>  01-10          Urinalysis Basic - ( 10 Darrick 2024 06:23 )    Color: x / Appearance: x / SG: x / pH: x  Gluc: 109 mg/dL / Ketone: x  / Bili: x / Urobili: x   Blood: x / Protein: x / Nitrite: x   Leuk Esterase: x / RBC: x / WBC x   Sq Epi: x / Non Sq Epi: x / Bacteria: x          RADIOLOGY & ADDITIONAL TESTS:    Imaging Personally Reviewed:  < from: US Breast Limited, Left (01.10.24 @ 08:54) >  IMPRESSION:  No sonographic evidence of breast abscess.    Clinical management of mastitis is recommended.    If symptoms do not resolve clinically, additional imaging in a dedicated   breast imaging center should be performed to exclude the possibility of   malignancy.    < end of copied text >    Consultant(s) Notes Reviewed:      Care Discussed with Consultants/Other Providers:

## 2024-01-10 NOTE — CHART NOTE - NSCHARTNOTEFT_GEN_A_CORE
84 yo F with CHF, Afib on home AC, POLO, DM, and CKD admitted for an acute CHF exacerbation noted to have complaints of L breast heaviness and noted to have b/l breast swelling, L>R on admission. Oncology contact as family reported patient had recent outpt mammogram with c/f inflammatory carcinoma. No records available for review at this time. Surg and derm consulted for eval, surg recommended outpt punch biopsy and derm recommended no skin biopsy at this time given lack of skin changes, thus would be low yield. Left breast US was performed to r/o abscess given mastitis, which was negative. Also of note, patient had CT chest on admission,  which showed "Partially imaged left breast demonstrates asymmetric   skin thickening and soft tissue infiltration laterally. Rounded left axillary lymph nodes measure up to   1 cm short axis. No mediastinal, hilar or internal mammary lymphadenopathy. Heterogeneously enlarged, multinodular right thyroid lobe."    Recommendations:  - Given no obvious lesions on inpatient imaging, recommend outpatient follow up with PMD for diagnostic mammogram if warranted and potential biopsy.  If biopsy performed and indicative of malignancy, patient can then establish care at [facility].   >> Patient reports the heaviness of her breasts has improved with diuresis. Swelling could potentially have been secondary to fluid overload.   - F/U also with PMD regarding thyroid enlargement    Rest of care as per primary team.    Patient and patient's daughter at bedside verbalized understanding and agreed with plan. 82 yo F with CHF, Afib on home AC, POLO, DM, and CKD admitted for an acute CHF exacerbation noted to have complaints of L breast heaviness and noted to have b/l breast swelling, L>R on admission. Oncology contact as family reported patient had recent outpt mammogram with c/f inflammatory carcinoma. No records available for review at this time. Surg and derm consulted for eval, surg recommended outpt punch biopsy and derm recommended no skin biopsy at this time given lack of skin changes, thus would be low yield. Left breast US was performed to r/o abscess given mastitis, which was negative. Also of note, patient had CT chest on admission,  which showed "Partially imaged left breast demonstrates asymmetric   skin thickening and soft tissue infiltration laterally. Rounded left axillary lymph nodes measure up to   1 cm short axis. No mediastinal, hilar or internal mammary lymphadenopathy. Heterogeneously enlarged, multinodular right thyroid lobe."    Recommendations:  - Given no obvious lesions on inpatient imaging, recommend outpatient follow up with PMD for diagnostic mammogram if warranted and potential biopsy.  If biopsy performed and indicative of malignancy, patient can then establish care at [facility].   >> Patient reports the heaviness of her breasts has improved with diuresis. Swelling could potentially have been secondary to fluid overload.   - F/U also with PMD regarding thyroid enlargement    Rest of care as per primary team.    Patient and patient's daughter at bedside verbalized understanding and agreed with plan. no

## 2024-01-10 NOTE — PROGRESS NOTE ADULT - SUBJECTIVE AND OBJECTIVE BOX
NEPHROLOGY     Patient seen and examined resting comfortably on room air, denies sob, reports ongoing cough, no pain, in no acute distress.     MEDICATIONS  (STANDING):  aMIOdarone    Tablet 400 milliGRAM(s) Oral every 8 hours  aMIOdarone    Tablet   Oral   apixaban 2.5 milliGRAM(s) Oral two times a day  atorvastatin 40 milliGRAM(s) Oral at bedtime  brimonidine 0.2% Ophthalmic Solution 1 Drop(s) Both EYES daily  chlorhexidine 2% Cloths 1 Application(s) Topical daily  dextrose 5%. 1000 milliLiter(s) (100 mL/Hr) IV Continuous <Continuous>  dextrose 5%. 1000 milliLiter(s) (50 mL/Hr) IV Continuous <Continuous>  dextrose 50% Injectable 25 Gram(s) IV Push once  dextrose 50% Injectable 12.5 Gram(s) IV Push once  dextrose 50% Injectable 25 Gram(s) IV Push once  ferrous    sulfate 325 milliGRAM(s) Oral daily  furosemide   Injectable 40 milliGRAM(s) IV Push daily  glucagon  Injectable 1 milliGRAM(s) IntraMuscular once  hydrocortisone 2.5% Ointment 1 Application(s) Topical two times a day  insulin glargine Injectable (LANTUS) 10 Unit(s) SubCutaneous at bedtime  insulin glargine Injectable (LANTUS) 20 Unit(s) SubCutaneous every morning  insulin lispro (ADMELOG) corrective regimen sliding scale   SubCutaneous three times a day before meals  insulin lispro (ADMELOG) corrective regimen sliding scale   SubCutaneous at bedtime  metoprolol succinate ER 25 milliGRAM(s) Oral daily  Nephro-patsy 1 Tablet(s) Oral daily    VITALS:  T(C): , Max: 37.5 (01-09-24 @ 20:45)  T(F): , Max: 99.5 (01-09-24 @ 20:45)  HR: 68 (01-10-24 @ 11:08)  BP: 166/85 (01-10-24 @ 11:08)  RR: 18 (01-10-24 @ 11:08)  SpO2: 68% (01-10-24 @ 11:08)    I and O's:    01-09 @ 07:01  -  01-10 @ 07:00  --------------------------------------------------------  IN: 600 mL / OUT: 3500 mL / NET: -2900 mL    01-10 @ 07:01  -  01-10 @ 13:07  --------------------------------------------------------  IN: 180 mL / OUT: 0 mL / NET: 180 mL    PHYSICAL EXAM:  Constitutional: Obese, lethargic, mildly confused  HEENT: NCAT, DMM  Neck: Supple, No JVD  Respiratory: CTA-b/l  Cardiovascular: RRR s1s2, no m/r/g  Gastrointestinal: BS+, soft, NT/ND  Extremities: (+)nonpitting edema, L>R  Neurological: no focal deficits; strength grossly intact  Back: no CVAT b/l  : +ext cath   Skin: No rashes, no nevi    LABS:                        9.2    4.64  )-----------( 226      ( 10 Darrick 2024 06:31 )             26.6     01-10    136  |  98  |  24<H>  ----------------------------<  109<H>  6.3<HH>   |  28  |  1.58<H>    Ca    9.3      10 Darrick 2024 06:23    TPro  7.6  /  Alb  3.5  /  TBili  0.6  /  DBili  x   /  AST  51<H>  /  ALT  <29  /  AlkPhos  147<H>  01-10    RADIOLOGY & ADDITIONAL STUDIES:  < from: US Breast Limited, Left (01.10.24 @ 08:54) >    IMPRESSION:  No sonographic evidence of breast abscess.    Clinical management of mastitis is recommended.    If symptoms do not resolve clinically, additional imaging in a dedicated   breast imaging center should be performed to exclude the possibility of   malignancy      --- End of Report ---      ESTEPHANIA SANCHEZ MD; Attending Radiologist  This document has been electronically signed. Darrick 10 2024  9:04AM    < end of copied text >

## 2024-01-10 NOTE — PROGRESS NOTE ADULT - ASSESSMENT
iron deficiency anemia   congestive heart failure   CKD    cbc daily   no signs of GI bleeding   occult negative   diuresis per cardiology   no plans for EGD/colonoscopy at this time  will follow   d/w pt and daughter at bedside    I reviewed the overnight course of events on the unit, re-confirming the patient history. I discussed the care with the patient and their family  The plan of care was discussed with the physician assistant and modifications were made to the notation where appropriate.   Differential diagnosis and plan of care discussed with patient after the evaluation  50 minutes spent on total encounter of which more than fifty percent of the encounter was spent counseling and/or coordinating care by the attending physician.  Advanced care planning was discussed with patient and family.  Advanced care planning forms were reviewed and discussed.  Risks, benefits and alternatives of gastroenterologic procedures were discussed in detail and all questions were answered.

## 2024-01-10 NOTE — PROGRESS NOTE ADULT - ASSESSMENT
83 f with     Acute on Chronic Systolic Congestive Heart Failure  - telemetry  - diurese   - echo noted  - cardiology follow    A Fib  - rate control  - Amiodarone  - AC with Eliquis 2.5 bid    L Breast abnormality  - US L breast noted   - Surgical evaluation noted  - Dermatology evaluation for possible bx noted  - Oncology evaluation noted  - further work up as OTP with PMD and possible Bx    Anemia iron deficiency  - follow Hct  - iron studies noted  - Iron supplementation   - Gastroenterology evaluation noted. No EGD/ Colon.    Diabetes Mellitus  - BS control  - ADA diet     CKD  - follow cr, lytes  - Nephrology follow Dr. Suarez    PPM  - check    HTN control    Glaucoma  - continue gtt     Obesity morbid  - nutrition education    PT    DVT prophylaxis  - PAS    d/w patient , daughter and ACP    DCP home in progress.      Jamal Ramachandran MD phone [phone]  83 f with     Acute on Chronic Systolic Congestive Heart Failure  - telemetry  - diurese   - echo noted  - cardiology follow    A Fib  - rate control  - Amiodarone  - AC with Eliquis 2.5 bid    L Breast abnormality  - US L breast noted   - Surgical evaluation noted  - Dermatology evaluation for possible bx noted  - Oncology evaluation noted  - further work up as OTP with PMD and possible Bx    Anemia iron deficiency  - follow Hct  - iron studies noted  - Iron supplementation   - Gastroenterology evaluation noted. No EGD/ Colon.    Diabetes Mellitus  - BS control  - ADA diet     CKD  - follow cr, lytes  - Nephrology follow Dr. Suarez    PPM  - check    HTN control    Glaucoma  - continue gtt     Obesity morbid  - nutrition education    PT    DVT prophylaxis  - PAS    d/w patient , daughter and ACP    DCP home in progress.      Jamal Ramachandran MD phone [phone]

## 2024-01-10 NOTE — PROGRESS NOTE ADULT - ASSESSMENT
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, 1/5/24 p/w SOB/L-sided swelling    (1)Renal - nonproteinuric CKD3b - likely due to hypertension/microvascular disease; prerenally mediated fluctuations in creatinine since admission    (2)Hyperkalemia - severely hemolyzed, repeat pending     (3)CV - L-sided swelling - likely primarily due to distributive changes associated with inflammatory breast CA; likely also a component of R-sided CHF in association with interstitial lung disease/cor pulmonale    (4)Surgery - concern for inflammatory L breast CA - eval noted    RECOMMEND:  (1)Lasix 40mg IV daily as ordered for now  (2)Dose new meds for GFR 20-30ml/min  (3)Follow up repeat BMP     Idalia Clifford DNP  [facility]  [phone]      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, 1/5/24 p/w SOB/L-sided swelling    (1)Renal - nonproteinuric CKD3b - likely due to hypertension/microvascular disease; prerenally mediated fluctuations in creatinine since admission    (2)Hyperkalemia - severely hemolyzed, repeat pending     (3)CV - L-sided swelling - likely primarily due to distributive changes associated with inflammatory breast CA; likely also a component of R-sided CHF in association with interstitial lung disease/cor pulmonale    (4)Surgery - concern for inflammatory L breast CA - eval noted    RECOMMEND:  (1)Lasix 40mg IV daily as ordered for now  (2)Dose new meds for GFR 20-30ml/min  (3)Follow up repeat BMP     Idalia Clifford DNP  [facility]  [phone]      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, 1/5/24 p/w SOB/L-sided swelling    (1)Renal - nonproteinuric CKD3b - likely due to hypertension/microvascular disease; prerenally mediated fluctuations in creatinine since admission    (2)Hyperkalemia - severely hemolyzed, repeat pending     (3)CV - L-sided swelling - likely primarily due to distributive changes associated with inflammatory breast CA; likely also a component of R-sided CHF in association with interstitial lung disease/cor pulmonale    (4)Surgery - concern for inflammatory L breast CA - eval noted    RECOMMEND:  (1)Lasix 40mg IV daily as ordered for now  (2)Dose new meds for GFR 20-30ml/min  (3)Follow up repeat BMP     Idalia Clifford DNP  [facility]  [phone]       RENAL ATTENDING NOTE  Patient seen and examined with NP. Agree with assessment and plan as above.  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, 1/5/24 p/w SOB/L-sided swelling    (1)Renal - nonproteinuric CKD3b - likely due to hypertension/microvascular disease; prerenally mediated fluctuations in creatinine since admission    (2)Hyperkalemia - severely hemolyzed, repeat pending     (3)CV - L-sided swelling - likely primarily due to distributive changes associated with inflammatory breast CA; likely also a component of R-sided CHF in association with interstitial lung disease/cor pulmonale    (4)Surgery - concern for inflammatory L breast CA - eval noted    RECOMMEND:  (1)Lasix 40mg IV daily as ordered for now  (2)Dose new meds for GFR 20-30ml/min  (3)Follow up repeat BMP     Idalia Clifford DNP  [facility]  [phone]       RENAL ATTENDING NOTE  Patient seen and examined with NP. Agree with assessment and plan as above.  Can advance to PO diuretics - Lasix 40mg po qd  D/C planning per primary team    Yoel Suarez MD  [facility]  [phone]

## 2024-01-10 NOTE — CHART NOTE - NSCHARTNOTEFT_GEN_A_CORE
came to see patient but off floor at ultrasound    patient having sustained atrial tachcyardia since 1/5/23 but rate controlled    will discuss with  Dr christine  if OK to  start amidoarone 400 TID with meal and restart lower dose eliquis 2.5 BID

## 2024-01-11 LAB
ANION GAP SERPL CALC-SCNC: 10 MMOL/L — SIGNIFICANT CHANGE UP (ref 5–17)
ANION GAP SERPL CALC-SCNC: 10 MMOL/L — SIGNIFICANT CHANGE UP (ref 5–17)
ANION GAP SERPL CALC-SCNC: 8 MMOL/L — SIGNIFICANT CHANGE UP (ref 5–17)
ANION GAP SERPL CALC-SCNC: 8 MMOL/L — SIGNIFICANT CHANGE UP (ref 5–17)
APTT BLD: 33.6 SEC — SIGNIFICANT CHANGE UP (ref 24.5–35.6)
APTT BLD: 33.6 SEC — SIGNIFICANT CHANGE UP (ref 24.5–35.6)
BUN SERPL-MCNC: 22 MG/DL — SIGNIFICANT CHANGE UP (ref 7–23)
BUN SERPL-MCNC: 22 MG/DL — SIGNIFICANT CHANGE UP (ref 7–23)
BUN SERPL-MCNC: 24 MG/DL — HIGH (ref 7–23)
BUN SERPL-MCNC: 24 MG/DL — HIGH (ref 7–23)
CALCIUM SERPL-MCNC: 8.7 MG/DL — SIGNIFICANT CHANGE UP (ref 8.4–10.5)
CALCIUM SERPL-MCNC: 8.7 MG/DL — SIGNIFICANT CHANGE UP (ref 8.4–10.5)
CALCIUM SERPL-MCNC: 9.2 MG/DL — SIGNIFICANT CHANGE UP (ref 8.4–10.5)
CALCIUM SERPL-MCNC: 9.2 MG/DL — SIGNIFICANT CHANGE UP (ref 8.4–10.5)
CHLORIDE SERPL-SCNC: 101 MMOL/L — SIGNIFICANT CHANGE UP (ref 96–108)
CHLORIDE SERPL-SCNC: 101 MMOL/L — SIGNIFICANT CHANGE UP (ref 96–108)
CHLORIDE SERPL-SCNC: 97 MMOL/L — SIGNIFICANT CHANGE UP (ref 96–108)
CHLORIDE SERPL-SCNC: 97 MMOL/L — SIGNIFICANT CHANGE UP (ref 96–108)
CO2 SERPL-SCNC: 28 MMOL/L — SIGNIFICANT CHANGE UP (ref 22–31)
CO2 SERPL-SCNC: 28 MMOL/L — SIGNIFICANT CHANGE UP (ref 22–31)
CO2 SERPL-SCNC: 29 MMOL/L — SIGNIFICANT CHANGE UP (ref 22–31)
CO2 SERPL-SCNC: 29 MMOL/L — SIGNIFICANT CHANGE UP (ref 22–31)
CREAT SERPL-MCNC: 1.6 MG/DL — HIGH (ref 0.5–1.3)
CREAT SERPL-MCNC: 1.6 MG/DL — HIGH (ref 0.5–1.3)
CREAT SERPL-MCNC: 1.88 MG/DL — HIGH (ref 0.5–1.3)
CREAT SERPL-MCNC: 1.88 MG/DL — HIGH (ref 0.5–1.3)
EGFR: 26 ML/MIN/1.73M2 — LOW
EGFR: 26 ML/MIN/1.73M2 — LOW
EGFR: 32 ML/MIN/1.73M2 — LOW
EGFR: 32 ML/MIN/1.73M2 — LOW
GLUCOSE BLDC GLUCOMTR-MCNC: 156 MG/DL — HIGH (ref 70–99)
GLUCOSE BLDC GLUCOMTR-MCNC: 156 MG/DL — HIGH (ref 70–99)
GLUCOSE BLDC GLUCOMTR-MCNC: 159 MG/DL — HIGH (ref 70–99)
GLUCOSE BLDC GLUCOMTR-MCNC: 159 MG/DL — HIGH (ref 70–99)
GLUCOSE BLDC GLUCOMTR-MCNC: 181 MG/DL — HIGH (ref 70–99)
GLUCOSE BLDC GLUCOMTR-MCNC: 181 MG/DL — HIGH (ref 70–99)
GLUCOSE BLDC GLUCOMTR-MCNC: 198 MG/DL — HIGH (ref 70–99)
GLUCOSE BLDC GLUCOMTR-MCNC: 198 MG/DL — HIGH (ref 70–99)
GLUCOSE BLDC GLUCOMTR-MCNC: 199 MG/DL — HIGH (ref 70–99)
GLUCOSE BLDC GLUCOMTR-MCNC: 199 MG/DL — HIGH (ref 70–99)
GLUCOSE BLDC GLUCOMTR-MCNC: 203 MG/DL — HIGH (ref 70–99)
GLUCOSE BLDC GLUCOMTR-MCNC: 203 MG/DL — HIGH (ref 70–99)
GLUCOSE SERPL-MCNC: 129 MG/DL — HIGH (ref 70–99)
GLUCOSE SERPL-MCNC: 129 MG/DL — HIGH (ref 70–99)
GLUCOSE SERPL-MCNC: 186 MG/DL — HIGH (ref 70–99)
GLUCOSE SERPL-MCNC: 186 MG/DL — HIGH (ref 70–99)
HCT VFR BLD CALC: 27 % — LOW (ref 34.5–45)
HCT VFR BLD CALC: 27 % — LOW (ref 34.5–45)
HCT VFR BLD CALC: 28.1 % — LOW (ref 34.5–45)
HCT VFR BLD CALC: 28.1 % — LOW (ref 34.5–45)
HGB BLD-MCNC: 9.4 G/DL — LOW (ref 11.5–15.5)
HGB BLD-MCNC: 9.4 G/DL — LOW (ref 11.5–15.5)
HGB BLD-MCNC: 9.7 G/DL — LOW (ref 11.5–15.5)
HGB BLD-MCNC: 9.7 G/DL — LOW (ref 11.5–15.5)
INR BLD: 1.34 RATIO — HIGH (ref 0.85–1.18)
INR BLD: 1.34 RATIO — HIGH (ref 0.85–1.18)
LACTATE SERPL-SCNC: 0.9 MMOL/L — SIGNIFICANT CHANGE UP (ref 0.5–2)
LACTATE SERPL-SCNC: 0.9 MMOL/L — SIGNIFICANT CHANGE UP (ref 0.5–2)
MCHC RBC-ENTMCNC: 27.6 PG — SIGNIFICANT CHANGE UP (ref 27–34)
MCHC RBC-ENTMCNC: 34.5 GM/DL — SIGNIFICANT CHANGE UP (ref 32–36)
MCHC RBC-ENTMCNC: 34.5 GM/DL — SIGNIFICANT CHANGE UP (ref 32–36)
MCHC RBC-ENTMCNC: 34.8 GM/DL — SIGNIFICANT CHANGE UP (ref 32–36)
MCHC RBC-ENTMCNC: 34.8 GM/DL — SIGNIFICANT CHANGE UP (ref 32–36)
MCV RBC AUTO: 79.4 FL — LOW (ref 80–100)
MCV RBC AUTO: 79.4 FL — LOW (ref 80–100)
MCV RBC AUTO: 80.1 FL — SIGNIFICANT CHANGE UP (ref 80–100)
MCV RBC AUTO: 80.1 FL — SIGNIFICANT CHANGE UP (ref 80–100)
NRBC # BLD: 0 /100 WBCS — SIGNIFICANT CHANGE UP (ref 0–0)
PLATELET # BLD AUTO: 225 K/UL — SIGNIFICANT CHANGE UP (ref 150–400)
PLATELET # BLD AUTO: 225 K/UL — SIGNIFICANT CHANGE UP (ref 150–400)
PLATELET # BLD AUTO: 231 K/UL — SIGNIFICANT CHANGE UP (ref 150–400)
PLATELET # BLD AUTO: 231 K/UL — SIGNIFICANT CHANGE UP (ref 150–400)
POTASSIUM SERPL-MCNC: 4.5 MMOL/L — SIGNIFICANT CHANGE UP (ref 3.5–5.3)
POTASSIUM SERPL-MCNC: 4.5 MMOL/L — SIGNIFICANT CHANGE UP (ref 3.5–5.3)
POTASSIUM SERPL-MCNC: 4.7 MMOL/L — SIGNIFICANT CHANGE UP (ref 3.5–5.3)
POTASSIUM SERPL-MCNC: 4.7 MMOL/L — SIGNIFICANT CHANGE UP (ref 3.5–5.3)
POTASSIUM SERPL-SCNC: 4.5 MMOL/L — SIGNIFICANT CHANGE UP (ref 3.5–5.3)
POTASSIUM SERPL-SCNC: 4.5 MMOL/L — SIGNIFICANT CHANGE UP (ref 3.5–5.3)
POTASSIUM SERPL-SCNC: 4.7 MMOL/L — SIGNIFICANT CHANGE UP (ref 3.5–5.3)
POTASSIUM SERPL-SCNC: 4.7 MMOL/L — SIGNIFICANT CHANGE UP (ref 3.5–5.3)
PROTHROM AB SERPL-ACNC: 14.6 SEC — HIGH (ref 9.5–13)
PROTHROM AB SERPL-ACNC: 14.6 SEC — HIGH (ref 9.5–13)
RBC # BLD: 3.4 M/UL — LOW (ref 3.8–5.2)
RBC # BLD: 3.4 M/UL — LOW (ref 3.8–5.2)
RBC # BLD: 3.51 M/UL — LOW (ref 3.8–5.2)
RBC # BLD: 3.51 M/UL — LOW (ref 3.8–5.2)
RBC # FLD: 15 % — HIGH (ref 10.3–14.5)
RBC # FLD: 15 % — HIGH (ref 10.3–14.5)
RBC # FLD: 15.2 % — HIGH (ref 10.3–14.5)
RBC # FLD: 15.2 % — HIGH (ref 10.3–14.5)
SODIUM SERPL-SCNC: 136 MMOL/L — SIGNIFICANT CHANGE UP (ref 135–145)
SODIUM SERPL-SCNC: 136 MMOL/L — SIGNIFICANT CHANGE UP (ref 135–145)
SODIUM SERPL-SCNC: 137 MMOL/L — SIGNIFICANT CHANGE UP (ref 135–145)
SODIUM SERPL-SCNC: 137 MMOL/L — SIGNIFICANT CHANGE UP (ref 135–145)
WBC # BLD: 4.7 K/UL — SIGNIFICANT CHANGE UP (ref 3.8–10.5)
WBC # BLD: 4.7 K/UL — SIGNIFICANT CHANGE UP (ref 3.8–10.5)
WBC # BLD: 4.82 K/UL — SIGNIFICANT CHANGE UP (ref 3.8–10.5)
WBC # BLD: 4.82 K/UL — SIGNIFICANT CHANGE UP (ref 3.8–10.5)
WBC # FLD AUTO: 4.7 K/UL — SIGNIFICANT CHANGE UP (ref 3.8–10.5)
WBC # FLD AUTO: 4.7 K/UL — SIGNIFICANT CHANGE UP (ref 3.8–10.5)
WBC # FLD AUTO: 4.82 K/UL — SIGNIFICANT CHANGE UP (ref 3.8–10.5)
WBC # FLD AUTO: 4.82 K/UL — SIGNIFICANT CHANGE UP (ref 3.8–10.5)

## 2024-01-11 PROCEDURE — 70450 CT HEAD/BRAIN W/O DYE: CPT | Mod: 26

## 2024-01-11 RX ORDER — METOPROLOL TARTRATE 50 MG
1 TABLET ORAL
Qty: 30 | Refills: 0
Start: 2024-01-11 | End: 2024-02-09

## 2024-01-11 RX ORDER — FERROUS SULFATE 325(65) MG
1 TABLET ORAL
Qty: 30 | Refills: 0
Start: 2024-01-11 | End: 2024-02-09

## 2024-01-11 RX ORDER — HYDROCORTISONE 1 %
1 OINTMENT (GRAM) TOPICAL
Qty: 1 | Refills: 0
Start: 2024-01-11 | End: 2024-01-24

## 2024-01-11 RX ORDER — APIXABAN 2.5 MG/1
1 TABLET, FILM COATED ORAL
Qty: 60 | Refills: 0
Start: 2024-01-11 | End: 2024-02-09

## 2024-01-11 RX ORDER — FINERENONE 20 MG/1
1 TABLET, FILM COATED ORAL
Refills: 0 | DISCHARGE

## 2024-01-11 RX ORDER — AMIODARONE HYDROCHLORIDE 400 MG/1
1 TABLET ORAL
Qty: 9 | Refills: 0
Start: 2024-01-11

## 2024-01-11 RX ORDER — HYDRALAZINE HCL 50 MG
1 TABLET ORAL
Refills: 0 | DISCHARGE

## 2024-01-11 RX ORDER — ACETAMINOPHEN 500 MG
2 TABLET ORAL
Qty: 0 | Refills: 0 | DISCHARGE
Start: 2024-01-11

## 2024-01-11 RX ORDER — APIXABAN 2.5 MG/1
1 TABLET, FILM COATED ORAL
Refills: 0 | DISCHARGE

## 2024-01-11 RX ADMIN — BRIMONIDINE TARTRATE 1 DROP(S): 2 SOLUTION/ DROPS OPHTHALMIC at 12:01

## 2024-01-11 RX ADMIN — INSULIN GLARGINE 10 UNIT(S): 100 INJECTION, SOLUTION SUBCUTANEOUS at 21:18

## 2024-01-11 RX ADMIN — Medication 1: at 08:11

## 2024-01-11 RX ADMIN — AMIODARONE HYDROCHLORIDE 400 MILLIGRAM(S): 400 TABLET ORAL at 21:19

## 2024-01-11 RX ADMIN — Medication 100 MILLIGRAM(S): at 21:21

## 2024-01-11 RX ADMIN — APIXABAN 2.5 MILLIGRAM(S): 2.5 TABLET, FILM COATED ORAL at 05:29

## 2024-01-11 RX ADMIN — ATORVASTATIN CALCIUM 40 MILLIGRAM(S): 80 TABLET, FILM COATED ORAL at 21:18

## 2024-01-11 RX ADMIN — Medication 325 MILLIGRAM(S): at 12:00

## 2024-01-11 RX ADMIN — Medication 1: at 12:00

## 2024-01-11 RX ADMIN — Medication 1: at 17:32

## 2024-01-11 RX ADMIN — Medication 40 MILLIGRAM(S): at 05:30

## 2024-01-11 RX ADMIN — Medication 1 APPLICATION(S): at 17:32

## 2024-01-11 RX ADMIN — AMIODARONE HYDROCHLORIDE 400 MILLIGRAM(S): 400 TABLET ORAL at 05:29

## 2024-01-11 RX ADMIN — AMIODARONE HYDROCHLORIDE 400 MILLIGRAM(S): 400 TABLET ORAL at 13:38

## 2024-01-11 RX ADMIN — INSULIN GLARGINE 20 UNIT(S): 100 INJECTION, SOLUTION SUBCUTANEOUS at 08:38

## 2024-01-11 RX ADMIN — Medication 25 MILLIGRAM(S): at 05:28

## 2024-01-11 RX ADMIN — Medication 1 TABLET(S): at 12:00

## 2024-01-11 RX ADMIN — CHLORHEXIDINE GLUCONATE 1 APPLICATION(S): 213 SOLUTION TOPICAL at 12:01

## 2024-01-11 RX ADMIN — Medication 1 APPLICATION(S): at 05:30

## 2024-01-11 RX ADMIN — APIXABAN 2.5 MILLIGRAM(S): 2.5 TABLET, FILM COATED ORAL at 17:32

## 2024-01-11 NOTE — PROGRESS NOTE ADULT - SUBJECTIVE AND OBJECTIVE BOX
Patient is a 83y old  Female who presents with a chief complaint of Heart failure    Per chart, pt is an 83 year old Female with PMH of Bradycardia s/p PPM, AFib on Eliquis, HTN, CKD, T2DM, presenting c/o SOB on exertion, cough w/ white sputum x2 wks. Also reports L-sided swelling (arm, breast and leg).  Had DVT, US of LUE and bilateral LLE, which were negative. 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    (08 Jan 2024 11:18)      SUBJECTIVE / OVERNIGHT EVENTS: Comfortable without new complaints. Daughter at bedside.  Review of Systems  chest pain no  palpitations no  sob no  nausea no  headache no    MEDICATIONS  (STANDING):  aMIOdarone    Tablet   Oral   aMIOdarone    Tablet 400 milliGRAM(s) Oral every 8 hours  apixaban 2.5 milliGRAM(s) Oral two times a day  atorvastatin 40 milliGRAM(s) Oral at bedtime  brimonidine 0.2% Ophthalmic Solution 1 Drop(s) Both EYES daily  chlorhexidine 2% Cloths 1 Application(s) Topical daily  dextrose 5%. 1000 milliLiter(s) (100 mL/Hr) IV Continuous <Continuous>  dextrose 5%. 1000 milliLiter(s) (50 mL/Hr) IV Continuous <Continuous>  dextrose 50% Injectable 25 Gram(s) IV Push once  dextrose 50% Injectable 12.5 Gram(s) IV Push once  dextrose 50% Injectable 25 Gram(s) IV Push once  ferrous    sulfate 325 milliGRAM(s) Oral daily  furosemide    Tablet 40 milliGRAM(s) Oral daily  glucagon  Injectable 1 milliGRAM(s) IntraMuscular once  hydrocortisone 2.5% Ointment 1 Application(s) Topical two times a day  insulin glargine Injectable (LANTUS) 10 Unit(s) SubCutaneous at bedtime  insulin glargine Injectable (LANTUS) 20 Unit(s) SubCutaneous every morning  insulin lispro (ADMELOG) corrective regimen sliding scale   SubCutaneous at bedtime  insulin lispro (ADMELOG) corrective regimen sliding scale   SubCutaneous three times a day before meals  metoprolol succinate ER 25 milliGRAM(s) Oral daily  Nephro-patsy 1 Tablet(s) Oral daily    MEDICATIONS  (PRN):  acetaminophen     Tablet .. 650 milliGRAM(s) Oral every 6 hours PRN Temp greater or equal to 38.5C (101.3F), Mild Pain (1 - 3)  albuterol    90 MICROgram(s) HFA Inhaler 2 Puff(s) Inhalation every 6 hours PRN Shortness of Breath and/or Wheezing  benzonatate 100 milliGRAM(s) Oral every 8 hours PRN Cough  dextrose Oral Gel 15 Gram(s) Oral once PRN Blood Glucose LESS THAN 70 milliGRAM(s)/deciliter  guaiFENesin Oral Liquid (Sugar-Free) 100 milliGRAM(s) Oral every 6 hours PRN Cough      Vital Signs Last 24 Hrs  T(C): 36.8 (11 Jan 2024 20:47), Max: 36.8 (11 Jan 2024 20:47)  T(F): 98.2 (11 Jan 2024 20:47), Max: 98.2 (11 Jan 2024 20:47)  HR: 72 (11 Jan 2024 20:47) (69 - 72)  BP: 106/69 (11 Jan 2024 20:47) (106/69 - 167/84)  BP(mean): --  RR: 18 (11 Jan 2024 20:47) (18 - 18)  SpO2: 95% (11 Jan 2024 20:47) (95% - 98%)    Parameters below as of 11 Jan 2024 20:47  Patient On (Oxygen Delivery Method): room air        PHYSICAL EXAM:  GENERAL: NAD, well-developed  HEAD:  Atraumatic, Normocephalic  EYES: EOMI, PERRLA, conjunctiva and sclera clear  NECK: Supple, No JVD  CHEST/LUNG: Clear to auscultation bilaterally; No wheeze L breast tender.  HEART: Regular rate and rhythm; No murmurs, rubs, or gallops  ABDOMEN: Soft, Nontender, Nondistended; Bowel sounds present  EXTREMITIES:  2+ Peripheral Pulses, No clubbing, cyanosis, or edema  PSYCH: AAOx3  NEUROLOGY: non-focal  SKIN: No rashes or lesions    LABS:                        9.7    4.82  )-----------( 231      ( 11 Jan 2024 15:53 )             28.1     01-11    136  |  97  |  24<H>  ----------------------------<  186<H>  4.5   |  29  |  1.88<H>    Ca    9.2      11 Jan 2024 15:38    TPro  7.6  /  Alb  3.5  /  TBili  0.6  /  DBili  x   /  AST  51<H>  /  ALT  <29  /  AlkPhos  147<H>  01-10    PT/INR - ( 11 Jan 2024 15:53 )   PT: 14.6 sec;   INR: 1.34 ratio         PTT - ( 11 Jan 2024 15:53 )  PTT:33.6 sec      Urinalysis Basic - ( 11 Jan 2024 15:38 )    Color: x / Appearance: x / SG: x / pH: x  Gluc: 186 mg/dL / Ketone: x  / Bili: x / Urobili: x   Blood: x / Protein: x / Nitrite: x   Leuk Esterase: x / RBC: x / WBC x   Sq Epi: x / Non Sq Epi: x / Bacteria: x          RADIOLOGY & ADDITIONAL TESTS:    Imaging Personally Reviewed:    Consultant(s) Notes Reviewed:      Care Discussed with Consultants/Other Providers:   Patient is a 83y old  Female who presents with a chief complaint of Heart failure    Per chart, pt is an 83 year old Female with PMH of Bradycardia s/p PPM, AFib on Eliquis, HTN, CKD, T2DM, presenting c/o SOB on exertion, cough w/ white sputum x2 wks. Also reports L-sided swelling (arm, breast and leg).  Had DVT, US of LUE and bilateral LLE, which were negative. 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    (08 Jan 2024 11:18)      SUBJECTIVE / OVERNIGHT EVENTS: Comfortable without new complaints. Daughter at bedside.  Review of Systems  chest pain no  palpitations no  sob no  nausea no  headache no    MEDICATIONS  (STANDING):  aMIOdarone    Tablet   Oral   aMIOdarone    Tablet 400 milliGRAM(s) Oral every 8 hours  apixaban 2.5 milliGRAM(s) Oral two times a day  atorvastatin 40 milliGRAM(s) Oral at bedtime  brimonidine 0.2% Ophthalmic Solution 1 Drop(s) Both EYES daily  chlorhexidine 2% Cloths 1 Application(s) Topical daily  dextrose 5%. 1000 milliLiter(s) (100 mL/Hr) IV Continuous <Continuous>  dextrose 5%. 1000 milliLiter(s) (50 mL/Hr) IV Continuous <Continuous>  dextrose 50% Injectable 25 Gram(s) IV Push once  dextrose 50% Injectable 12.5 Gram(s) IV Push once  dextrose 50% Injectable 25 Gram(s) IV Push once  ferrous    sulfate 325 milliGRAM(s) Oral daily  furosemide    Tablet 40 milliGRAM(s) Oral daily  glucagon  Injectable 1 milliGRAM(s) IntraMuscular once  hydrocortisone 2.5% Ointment 1 Application(s) Topical two times a day  insulin glargine Injectable (LANTUS) 10 Unit(s) SubCutaneous at bedtime  insulin glargine Injectable (LANTUS) 20 Unit(s) SubCutaneous every morning  insulin lispro (ADMELOG) corrective regimen sliding scale   SubCutaneous at bedtime  insulin lispro (ADMELOG) corrective regimen sliding scale   SubCutaneous three times a day before meals  metoprolol succinate ER 25 milliGRAM(s) Oral daily  Nephro-pasty 1 Tablet(s) Oral daily    MEDICATIONS  (PRN):  acetaminophen     Tablet .. 650 milliGRAM(s) Oral every 6 hours PRN Temp greater or equal to 38.5C (101.3F), Mild Pain (1 - 3)  albuterol    90 MICROgram(s) HFA Inhaler 2 Puff(s) Inhalation every 6 hours PRN Shortness of Breath and/or Wheezing  benzonatate 100 milliGRAM(s) Oral every 8 hours PRN Cough  dextrose Oral Gel 15 Gram(s) Oral once PRN Blood Glucose LESS THAN 70 milliGRAM(s)/deciliter  guaiFENesin Oral Liquid (Sugar-Free) 100 milliGRAM(s) Oral every 6 hours PRN Cough      Vital Signs Last 24 Hrs  T(C): 36.8 (11 Jan 2024 20:47), Max: 36.8 (11 Jan 2024 20:47)  T(F): 98.2 (11 Jan 2024 20:47), Max: 98.2 (11 Jan 2024 20:47)  HR: 72 (11 Jan 2024 20:47) (69 - 72)  BP: 106/69 (11 Jan 2024 20:47) (106/69 - 167/84)  BP(mean): --  RR: 18 (11 Jan 2024 20:47) (18 - 18)  SpO2: 95% (11 Jan 2024 20:47) (95% - 98%)    Parameters below as of 11 Jan 2024 20:47  Patient On (Oxygen Delivery Method): room air        PHYSICAL EXAM:  GENERAL: NAD, well-developed  HEAD:  Atraumatic, Normocephalic  EYES: EOMI, PERRLA, conjunctiva and sclera clear  NECK: Supple, No JVD  CHEST/LUNG: Clear to auscultation bilaterally; No wheeze L breast tender.  HEART: Regular rate and rhythm; No murmurs, rubs, or gallops  ABDOMEN: Soft, Nontender, Nondistended; Bowel sounds present  EXTREMITIES:  2+ Peripheral Pulses, No clubbing, cyanosis, or edema  PSYCH: AAOx3  NEUROLOGY: non-focal  SKIN: No rashes or lesions    LABS:                        9.7    4.82  )-----------( 231      ( 11 Jan 2024 15:53 )             28.1     01-11    136  |  97  |  24<H>  ----------------------------<  186<H>  4.5   |  29  |  1.88<H>    Ca    9.2      11 Jan 2024 15:38    TPro  7.6  /  Alb  3.5  /  TBili  0.6  /  DBili  x   /  AST  51<H>  /  ALT  <29  /  AlkPhos  147<H>  01-10    PT/INR - ( 11 Jan 2024 15:53 )   PT: 14.6 sec;   INR: 1.34 ratio         PTT - ( 11 Jan 2024 15:53 )  PTT:33.6 sec      Urinalysis Basic - ( 11 Jan 2024 15:38 )    Color: x / Appearance: x / SG: x / pH: x  Gluc: 186 mg/dL / Ketone: x  / Bili: x / Urobili: x   Blood: x / Protein: x / Nitrite: x   Leuk Esterase: x / RBC: x / WBC x   Sq Epi: x / Non Sq Epi: x / Bacteria: x          RADIOLOGY & ADDITIONAL TESTS:    Imaging Personally Reviewed:    Consultant(s) Notes Reviewed:      Care Discussed with Consultants/Other Providers:

## 2024-01-11 NOTE — PROGRESS NOTE ADULT - SUBJECTIVE AND OBJECTIVE BOX
Overnight events noted      VITAL:  T(C): , Max: 36.8 (01-10-24 @ 20:42)  T(F): , Max: 98.3 (01-10-24 @ 20:42)  HR: 70 (01-11-24 @ 04:00)  BP: 138/70 (01-11-24 @ 04:00)  BP(mean): --  RR: 18 (01-11-24 @ 04:00)  SpO2: 97% (01-11-24 @ 04:00)  Wt(kg): --      PHYSICAL EXAM:  Constitutional: Obese, lethargic, mildly confused  HEENT: NCAT, DMM  Neck: Supple, No JVD  Respiratory: CTA-b/l  Cardiovascular: RRR s1s2, no m/r/g  Gastrointestinal: BS+, soft, NT/ND  Extremities: (+)nonpitting edema, L>R  Neurological: no focal deficits; strength grossly intact  Back: no CVAT b/l  : +ext cath   Skin: No rashes, no nevi      LABS:                        9.4    4.70  )-----------( 225      ( 11 Jan 2024 05:07 )             27.0     Na(137)/K(4.7)/Cl(101)/HCO3(28)/BUN(22)/Cr(1.60)Glu(129)/Ca(8.7)/Mg(--)/PO4(--)    01-11 @ 05:07  Na(136)/K(6.3)/Cl(98)/HCO3(28)/BUN(24)/Cr(1.58)Glu(109)/Ca(9.3)/Mg(--)/PO4(--)    01-10 @ 06:23  Na(136)/K(4.4)/Cl(98)/HCO3(30)/BUN(28)/Cr(1.75)Glu(192)/Ca(8.9)/Mg(--)/PO4(--)    01-09 @ 06:29      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, 1/5/24 p/w SOB/L-sided swelling    (1)Renal - nonproteinuric CKD3b - likely due to hypertension/microvascular disease; prerenally mediated fluctuations in creatinine since admission    (2)Lytes - acceptable    (3)CV - L-sided swelling - likely primarily due to distributive changes associated with inflammatory breast CA    (4)Surgery - concern for inflammatory L breast CA - eval noted    RECOMMEND:  (1)Lasix 40mg po qd as ordered  (2)No renal objection to discharge - could f/u  at my office in 1-2 months            Yoel Suarez MD  [facility]  Office/on call physician: [phone]  Cell (7a-7p): [phone]       Overnight events noted      VITAL:  T(C): , Max: 36.8 (01-10-24 @ 20:42)  T(F): , Max: 98.3 (01-10-24 @ 20:42)  HR: 70 (01-11-24 @ 04:00)  BP: 138/70 (01-11-24 @ 04:00)  BP(mean): --  RR: 18 (01-11-24 @ 04:00)  SpO2: 97% (01-11-24 @ 04:00)  Wt(kg): --      PHYSICAL EXAM:  Constitutional: Obese, lethargic, mildly confused  HEENT: NCAT, DMM  Neck: Supple, No JVD  Respiratory: CTA-b/l  Cardiovascular: RRR s1s2, no m/r/g  Gastrointestinal: BS+, soft, NT/ND  Extremities: (+)nonpitting edema, L>R  Neurological: no focal deficits; strength grossly intact  Back: no CVAT b/l  : +ext cath   Skin: No rashes, no nevi      LABS:                        9.4    4.70  )-----------( 225      ( 11 Jan 2024 05:07 )             27.0     Na(137)/K(4.7)/Cl(101)/HCO3(28)/BUN(22)/Cr(1.60)Glu(129)/Ca(8.7)/Mg(--)/PO4(--)    01-11 @ 05:07  Na(136)/K(6.3)/Cl(98)/HCO3(28)/BUN(24)/Cr(1.58)Glu(109)/Ca(9.3)/Mg(--)/PO4(--)    01-10 @ 06:23  Na(136)/K(4.4)/Cl(98)/HCO3(30)/BUN(28)/Cr(1.75)Glu(192)/Ca(8.9)/Mg(--)/PO4(--)    01-09 @ 06:29      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, 1/5/24 p/w SOB/L-sided swelling    (1)Renal - nonproteinuric CKD3b - likely due to hypertension/microvascular disease; prerenally mediated fluctuations in creatinine since admission    (2)Lytes - acceptable    (3)CV - L-sided swelling - likely primarily due to distributive changes associated with inflammatory breast CA    (4)Surgery - concern for inflammatory L breast CA - eval noted    RECOMMEND:  (1)Lasix 40mg po qd as ordered  (2)No renal objection to discharge - could f/u  at my office in 1-2 months            Yoel Suarez MD  [facility]  Office/on call physician: [phone]  Cell (7a-7p): [phone]       No complaints  Daughter at bedside    VITAL:  T(C): , Max: 36.8 (01-10-24 @ 20:42)  T(F): , Max: 98.3 (01-10-24 @ 20:42)  HR: 70 (01-11-24 @ 04:00)  BP: 138/70 (01-11-24 @ 04:00)  BP(mean): --  RR: 18 (01-11-24 @ 04:00)  SpO2: 97% (01-11-24 @ 04:00)  Wt(kg): --      PHYSICAL EXAM:  Constitutional: Obese, lethargic, mildly confused  HEENT: NCAT, DMM  Neck: Supple, No JVD  Respiratory: CTA-b/l  Cardiovascular: RRR s1s2, no m/r/g  Gastrointestinal: BS+, soft, NT/ND  Extremities: (+)nonpitting edema, L>R  Neurological: no focal deficits; strength grossly intact  Back: no CVAT b/l  : +ext cath   Skin: No rashes, no nevi      LABS:                        9.4    4.70  )-----------( 225      ( 11 Jan 2024 05:07 )             27.0     Na(137)/K(4.7)/Cl(101)/HCO3(28)/BUN(22)/Cr(1.60)Glu(129)/Ca(8.7)/Mg(--)/PO4(--)    01-11 @ 05:07  Na(136)/K(6.3)/Cl(98)/HCO3(28)/BUN(24)/Cr(1.58)Glu(109)/Ca(9.3)/Mg(--)/PO4(--)    01-10 @ 06:23  Na(136)/K(4.4)/Cl(98)/HCO3(30)/BUN(28)/Cr(1.75)Glu(192)/Ca(8.9)/Mg(--)/PO4(--)    01-09 @ 06:29      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, 1/5/24 p/w SOB/L-sided swelling    (1)Renal - nonproteinuric CKD3b - likely due to hypertension/microvascular disease; prerenally mediated fluctuations in creatinine since admission    (2)Lytes - acceptable    (3)CV - L-sided swelling - likely primarily due to distributive changes associated with inflammatory breast CA    (4)Surgery - concern for inflammatory L breast CA - eval noted    RECOMMEND:  (1)Lasix 40mg po qd as ordered  (2)No renal objection to discharge - could f/u  at my office in 1-2 months            Yoel Suarez MD  [facility]  Office/on call physician: [phone]  Cell (7a-7p): [phone]       No complaints  Daughter at bedside    VITAL:  T(C): , Max: 36.8 (01-10-24 @ 20:42)  T(F): , Max: 98.3 (01-10-24 @ 20:42)  HR: 70 (01-11-24 @ 04:00)  BP: 138/70 (01-11-24 @ 04:00)  BP(mean): --  RR: 18 (01-11-24 @ 04:00)  SpO2: 97% (01-11-24 @ 04:00)  Wt(kg): --      PHYSICAL EXAM:  Constitutional: Obese, lethargic, mildly confused  HEENT: NCAT, DMM  Neck: Supple, No JVD  Respiratory: CTA-b/l  Cardiovascular: RRR s1s2, no m/r/g  Gastrointestinal: BS+, soft, NT/ND  Extremities: (+)nonpitting edema, L>R  Neurological: no focal deficits; strength grossly intact  Back: no CVAT b/l  : +ext cath   Skin: No rashes, no nevi      LABS:                        9.4    4.70  )-----------( 225      ( 11 Jan 2024 05:07 )             27.0     Na(137)/K(4.7)/Cl(101)/HCO3(28)/BUN(22)/Cr(1.60)Glu(129)/Ca(8.7)/Mg(--)/PO4(--)    01-11 @ 05:07  Na(136)/K(6.3)/Cl(98)/HCO3(28)/BUN(24)/Cr(1.58)Glu(109)/Ca(9.3)/Mg(--)/PO4(--)    01-10 @ 06:23  Na(136)/K(4.4)/Cl(98)/HCO3(30)/BUN(28)/Cr(1.75)Glu(192)/Ca(8.9)/Mg(--)/PO4(--)    01-09 @ 06:29      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, 1/5/24 p/w SOB/L-sided swelling    (1)Renal - nonproteinuric CKD3b - likely due to hypertension/microvascular disease; prerenally mediated fluctuations in creatinine since admission    (2)Lytes - acceptable    (3)CV - L-sided swelling - likely primarily due to distributive changes associated with inflammatory breast CA    (4)Surgery - concern for inflammatory L breast CA - eval noted    RECOMMEND:  (1)Lasix 40mg po qd as ordered  (2)No renal objection to discharge - could f/u  at my office in 1-2 months            Yoel Suarez MD  [facility]  Office/on call physician: [phone]  Cell (7a-7p): [phone]

## 2024-01-11 NOTE — DISCHARGE NOTE NURSING/CASE MANAGEMENT/SOCIAL WORK - NSSCNAMETXT_GEN_ALL_CORE
A Nurse From [facility] Will Contact You 1 Day Post Discharge To Schedule A Visit A Nurse From [facility] Will Contact You 1 Day Post Discharge To Schedule A Visit

## 2024-01-11 NOTE — PROGRESS NOTE ADULT - ASSESSMENT
83 f with     Acute on Chronic Systolic Congestive Heart Failure  - telemetry  - diurese   - echo noted  - cardiology follow    A Fib  - rate control  - Amiodarone  - AC with Eliquis 2.5 bid    L Breast abnormality  - US L breast noted   - Surgical evaluation noted  - Dermatology evaluation for possible bx noted  - Oncology evaluation noted  - further work up as OTP with PMD and possible Bx    Anemia iron deficiency  - follow Hct  - iron studies noted  - Iron supplementation   - Gastroenterology evaluation noted. No EGD/ Colon.    Diabetes Mellitus  - BS control  - ADA diet     CKD  - follow cr, lytes  - Nephrology follow Dr. Suarez    PPM  - check    HTN control    Glaucoma  - continue gtt     Obesity morbid  - nutrition education    PT    DVT prophylaxis  - PAS    d/w patient , daughter and ACP    DCP home in progress.      Jamal Ramachandran MD phone [phone]  83 f with     Acute on Chronic Systolic Congestive Heart Failure  - telemetry  - diurese   - echo noted  - cardiology follow    A Fib  - rate control  - Amiodarone  - AC with Eliquis 2.5 bid    L Breast abnormality  - US L breast noted   - Surgical evaluation noted  - Dermatology evaluation for possible bx noted  - Oncology evaluation noted  - further work up as OTP with PMD and possible Bx    Anemia iron deficiency  - follow Hct  - iron studies noted  - Iron supplementation   - Gastroenterology evaluation noted. No EGD/ Colon.    Diabetes Mellitus  - BS control  - ADA diet     CKD  - follow cr, lytes  - Nephrology follow Dr. Suarez    PPM  - check    HTN control    Glaucoma  - continue gtt     Obesity morbid  - nutrition education    PT    DVT prophylaxis  - PAS    d/w patient , daughter and ACP    DCP home in progress.      Jamal Ramachandran MD phone [phone]

## 2024-01-11 NOTE — DISCHARGE NOTE NURSING/CASE MANAGEMENT/SOCIAL WORK - PATIENT PORTAL LINK FT
You can access the FollowMyHealth Patient Portal offered by [facility] by registering at the following website: [email or web address]. By joining [facility]’s FollowMyHealth portal, you will also be able to view your health information using other applications (apps) compatible with our system. You can access the FollowMyHealth Patient Portal offered by [facility] by registering at the following website: [email or web address]. By joining [facility]’s FollowMyHealth portal, you will also be able to view your health information using other applications (apps) compatible with our system.

## 2024-01-11 NOTE — PROGRESS NOTE ADULT - SUBJECTIVE AND OBJECTIVE BOX
[facility]  Luisito Mario PA-C  [address] [place] [address]  [phone]      INTERVAL HPI/OVERNIGHT EVENTS:  pt seen and examined, no new events   hgb stable       MEDICATIONS  (STANDING):  atorvastatin 40 milliGRAM(s) Oral at bedtime  brimonidine 0.2% Ophthalmic Solution 1 Drop(s) Both EYES daily  chlorhexidine 2% Cloths 1 Application(s) Topical daily  dextrose 5%. 1000 milliLiter(s) (100 mL/Hr) IV Continuous <Continuous>  dextrose 5%. 1000 milliLiter(s) (50 mL/Hr) IV Continuous <Continuous>  dextrose 50% Injectable 25 Gram(s) IV Push once  dextrose 50% Injectable 12.5 Gram(s) IV Push once  dextrose 50% Injectable 25 Gram(s) IV Push once  ferrous    sulfate 325 milliGRAM(s) Oral daily  furosemide   Injectable 40 milliGRAM(s) IV Push daily  glucagon  Injectable 1 milliGRAM(s) IntraMuscular once  insulin glargine Injectable (LANTUS) 10 Unit(s) SubCutaneous at bedtime  insulin glargine Injectable (LANTUS) 20 Unit(s) SubCutaneous every morning  insulin lispro (ADMELOG) corrective regimen sliding scale   SubCutaneous three times a day before meals  insulin lispro (ADMELOG) corrective regimen sliding scale   SubCutaneous at bedtime  metoprolol succinate ER 25 milliGRAM(s) Oral daily  Nephro-patsy 1 Tablet(s) Oral daily    MEDICATIONS  (PRN):  acetaminophen     Tablet .. 650 milliGRAM(s) Oral every 6 hours PRN Temp greater or equal to 38.5C (101.3F), Mild Pain (1 - 3)  albuterol    90 MICROgram(s) HFA Inhaler 2 Puff(s) Inhalation every 6 hours PRN Shortness of Breath and/or Wheezing  benzonatate 100 milliGRAM(s) Oral every 8 hours PRN Cough  dextrose Oral Gel 15 Gram(s) Oral once PRN Blood Glucose LESS THAN 70 milliGRAM(s)/deciliter  guaiFENesin Oral Liquid (Sugar-Free) 100 milliGRAM(s) Oral every 6 hours PRN Cough      Allergies    lisinopril (Hives)  penicillin (Unknown)    Intolerances        ROS:   General:  No wt loss, fevers, chills, night sweats, fatigue,   Eyes:  Good vision, no reported pain  ENT:  No sore throat, pain, runny nose, dysphagia  CV:  No pain, palpitations, hypo/hypertension  Resp:  No dyspnea, cough, tachypnea, wheezing  GI:  No pain, No nausea, No vomiting, No diarrhea, No constipation, No weight loss, No fever, No pruritis, No rectal bleeding, No tarry stools, No dysphagia,  :  No pain, bleeding, incontinence, nocturia  Muscle:  No pain, weakness  Neuro:  No weakness, tingling, memory problems  Psych:  No fatigue, insomnia, mood problems, depression  Endocrine:  No polyuria, polydipsia, cold/heat intolerance  Heme:  No petechiae, ecchymosis, easy bruisability  Skin:  No rash, tattoos, scars, edema      PHYSICAL EXAM:   Vital Signs Last 24 Hrs  T(C): 36.5 (2024 11:30), Max: 36.8 (10 Darrick 2024 20:42)  T(F): 97.7 (2024 11:30), Max: 98.3 (10 Darrick 2024 20:42)  HR: 70 (2024 11:30) (69 - 73)  BP: 126/77 (2024 11:30) (126/77 - 167/84)  BP(mean): --  RR: 18 (2024 11:30) (18 - 18)  SpO2: 98% (2024 11:30) (94% - 98%)    Parameters below as of 2024 11:30  Patient On (Oxygen Delivery Method): room air      Daily     Daily Weight in k.5 (2024 08:19)    GENERAL:  Appears stated age,   HEENT:  NC/AT,    CHEST:  Full & symmetric excursion,   HEART:  Regular rhythm,  ABDOMEN:  Soft, non-tender, non-distended,  EXTEREMITIES:  no cyanosis  SKIN:  No rash  NEURO:  Alert,       LABS:                        9.4    4.70  )-----------( 225      ( 2024 05:07 )             27.0       137  |  101  |  22  ----------------------------<  129<H>  4.7   |  28  |  1.60<H>    Ca    8.7      2024 05:07    TPro  7.6  /  Alb  3.5  /  TBili  0.6  /  DBili  x   /  AST  51<H>  /  ALT  <29  /  AlkPhos  147<H>  01-10    Urinalysis Basic - ( 2024 06:29 )    Color: x / Appearance: x / SG: x / pH: x  Gluc: 192 mg/dL / Ketone: x  / Bili: x / Urobili: x   Blood: x / Protein: x / Nitrite: x   Leuk Esterase: x / RBC: x / WBC x   Sq Epi: x / Non Sq Epi: x / Bacteria: x        RADIOLOGY & ADDITIONAL TESTS:   [facility]  Luisito Mario PA-C  [address] [place] [address]  [phone]      INTERVAL HPI/OVERNIGHT EVENTS:  pt seen and examined, no new events   hgb stable       MEDICATIONS  (STANDING):  atorvastatin 40 milliGRAM(s) Oral at bedtime  brimonidine 0.2% Ophthalmic Solution 1 Drop(s) Both EYES daily  chlorhexidine 2% Cloths 1 Application(s) Topical daily  dextrose 5%. 1000 milliLiter(s) (100 mL/Hr) IV Continuous <Continuous>  dextrose 5%. 1000 milliLiter(s) (50 mL/Hr) IV Continuous <Continuous>  dextrose 50% Injectable 25 Gram(s) IV Push once  dextrose 50% Injectable 12.5 Gram(s) IV Push once  dextrose 50% Injectable 25 Gram(s) IV Push once  ferrous    sulfate 325 milliGRAM(s) Oral daily  furosemide   Injectable 40 milliGRAM(s) IV Push daily  glucagon  Injectable 1 milliGRAM(s) IntraMuscular once  insulin glargine Injectable (LANTUS) 10 Unit(s) SubCutaneous at bedtime  insulin glargine Injectable (LANTUS) 20 Unit(s) SubCutaneous every morning  insulin lispro (ADMELOG) corrective regimen sliding scale   SubCutaneous three times a day before meals  insulin lispro (ADMELOG) corrective regimen sliding scale   SubCutaneous at bedtime  metoprolol succinate ER 25 milliGRAM(s) Oral daily  Nephro-patsy 1 Tablet(s) Oral daily    MEDICATIONS  (PRN):  acetaminophen     Tablet .. 650 milliGRAM(s) Oral every 6 hours PRN Temp greater or equal to 38.5C (101.3F), Mild Pain (1 - 3)  albuterol    90 MICROgram(s) HFA Inhaler 2 Puff(s) Inhalation every 6 hours PRN Shortness of Breath and/or Wheezing  benzonatate 100 milliGRAM(s) Oral every 8 hours PRN Cough  dextrose Oral Gel 15 Gram(s) Oral once PRN Blood Glucose LESS THAN 70 milliGRAM(s)/deciliter  guaiFENesin Oral Liquid (Sugar-Free) 100 milliGRAM(s) Oral every 6 hours PRN Cough      Allergies    lisinopril (Hives)  penicillin (Unknown)    Intolerances        ROS:   General:  No wt loss, fevers, chills, night sweats, fatigue,   Eyes:  Good vision, no reported pain  ENT:  No sore throat, pain, runny nose, dysphagia  CV:  No pain, palpitations, hypo/hypertension  Resp:  No dyspnea, cough, tachypnea, wheezing  GI:  No pain, No nausea, No vomiting, No diarrhea, No constipation, No weight loss, No fever, No pruritis, No rectal bleeding, No tarry stools, No dysphagia,  :  No pain, bleeding, incontinence, nocturia  Muscle:  No pain, weakness  Neuro:  No weakness, tingling, memory problems  Psych:  No fatigue, insomnia, mood problems, depression  Endocrine:  No polyuria, polydipsia, cold/heat intolerance  Heme:  No petechiae, ecchymosis, easy bruisability  Skin:  No rash, tattoos, scars, edema      PHYSICAL EXAM:   Vital Signs Last 24 Hrs  T(C): 36.5 (2024 11:30), Max: 36.8 (10 Darrick 2024 20:42)  T(F): 97.7 (2024 11:30), Max: 98.3 (10 Darrick 2024 20:42)  HR: 70 (2024 11:30) (69 - 73)  BP: 126/77 (2024 11:30) (126/77 - 167/84)  BP(mean): --  RR: 18 (2024 11:30) (18 - 18)  SpO2: 98% (2024 11:30) (94% - 98%)    Parameters below as of 2024 11:30  Patient On (Oxygen Delivery Method): room air      Daily     Daily Weight in k.5 (2024 08:19)    GENERAL:  Appears stated age,   HEENT:  NC/AT,    CHEST:  Full & symmetric excursion,   HEART:  Regular rhythm,  ABDOMEN:  Soft, non-tender, non-distended,  EXTEREMITIES:  no cyanosis  SKIN:  No rash  NEURO:  Alert,       LABS:                        9.4    4.70  )-----------( 225      ( 2024 05:07 )             27.0       137  |  101  |  22  ----------------------------<  129<H>  4.7   |  28  |  1.60<H>    Ca    8.7      2024 05:07    TPro  7.6  /  Alb  3.5  /  TBili  0.6  /  DBili  x   /  AST  51<H>  /  ALT  <29  /  AlkPhos  147<H>  01-10    Urinalysis Basic - ( 2024 06:29 )    Color: x / Appearance: x / SG: x / pH: x  Gluc: 192 mg/dL / Ketone: x  / Bili: x / Urobili: x   Blood: x / Protein: x / Nitrite: x   Leuk Esterase: x / RBC: x / WBC x   Sq Epi: x / Non Sq Epi: x / Bacteria: x        RADIOLOGY & ADDITIONAL TESTS:

## 2024-01-11 NOTE — RAPID RESPONSE TEAM SUMMARY - NSSITUATIONBACKGROUNDRRT_GEN_ALL_CORE
83F w/ obesity, HTN, DM2, CKD, PPM, and 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, SOB/L-sided swelling, admitted for HF exacerbation. RRT was called due to concern for an acute stroke. The patient's granddaughter noted that the patient was acutely dysarthric and with left sided facial droop. The primary team confirmed this was an acute change from baseline. Thus, a code stroke was called overhead and the code stroke team took over the medical rapid response.

## 2024-01-11 NOTE — DISCHARGE NOTE NURSING/CASE MANAGEMENT/SOCIAL WORK - NSDCPEFALRISK_GEN_ALL_CORE
For information on Fall & Injury Prevention, visit: [email or web address] OR  [email or web address] OR  https://www.cdc.gov/steadi/patient.html For information on Fall & Injury Prevention, visit: [email or web address] OR  [email or web address] OR  https://www.cdc.gov/steadi/patient.html

## 2024-01-11 NOTE — PROGRESS NOTE ADULT - SUBJECTIVE AND OBJECTIVE BOX
INTERVAL HPI/OVERNIGHT EVENTS: patient noted feeling well, less edema    MEDICATIONS  (STANDING):  aMIOdarone    Tablet   Oral   aMIOdarone    Tablet 400 milliGRAM(s) Oral every 8 hours  apixaban 2.5 milliGRAM(s) Oral two times a day  atorvastatin 40 milliGRAM(s) Oral at bedtime  brimonidine 0.2% Ophthalmic Solution 1 Drop(s) Both EYES daily  chlorhexidine 2% Cloths 1 Application(s) Topical daily  dextrose 5%. 1000 milliLiter(s) (50 mL/Hr) IV Continuous <Continuous>  dextrose 5%. 1000 milliLiter(s) (100 mL/Hr) IV Continuous <Continuous>  dextrose 50% Injectable 12.5 Gram(s) IV Push once  dextrose 50% Injectable 25 Gram(s) IV Push once  dextrose 50% Injectable 25 Gram(s) IV Push once  ferrous    sulfate 325 milliGRAM(s) Oral daily  furosemide    Tablet 40 milliGRAM(s) Oral daily  glucagon  Injectable 1 milliGRAM(s) IntraMuscular once  hydrocortisone 2.5% Ointment 1 Application(s) Topical two times a day  insulin glargine Injectable (LANTUS) 10 Unit(s) SubCutaneous at bedtime  insulin glargine Injectable (LANTUS) 20 Unit(s) SubCutaneous every morning  insulin lispro (ADMELOG) corrective regimen sliding scale   SubCutaneous three times a day before meals  insulin lispro (ADMELOG) corrective regimen sliding scale   SubCutaneous at bedtime  metoprolol succinate ER 25 milliGRAM(s) Oral daily  Nephro-patsy 1 Tablet(s) Oral daily    MEDICATIONS  (PRN):  acetaminophen     Tablet .. 650 milliGRAM(s) Oral every 6 hours PRN Temp greater or equal to 38.5C (101.3F), Mild Pain (1 - 3)  albuterol    90 MICROgram(s) HFA Inhaler 2 Puff(s) Inhalation every 6 hours PRN Shortness of Breath and/or Wheezing  benzonatate 100 milliGRAM(s) Oral every 8 hours PRN Cough  dextrose Oral Gel 15 Gram(s) Oral once PRN Blood Glucose LESS THAN 70 milliGRAM(s)/deciliter  guaiFENesin Oral Liquid (Sugar-Free) 100 milliGRAM(s) Oral every 6 hours PRN Cough      Allergies    lisinopril (Hives)  penicillin (Unknown)    Intolerances      ROS:  General: Pt denies recent weight loss/fever/chills    Neurological: denies numbness or  sensation loss    Cardiovascular: denies chest pain/palpitations/leg edema    Respiratory and Thorax: denies SOB/cough/wheezing    Gastrointestinal: denies abdominal pain/diarrhea/constipation/bloody stool    Genitourinary: denies urinary frequency/urgency/ dysuria    Musculoskeletal: denies joint pain or swelling, denies restricted motion    Hematologic: denies abnormal bleeding  	    	  	    		        	    	            Vital Signs Last 24 Hrs  T(C): 36.6 (2024 04:00), Max: 36.8 (10 Darrick 2024 20:42)  T(F): 97.9 (2024 04:00), Max: 98.3 (10 Darrick 2024 20:42)  HR: 70 (:00) (68 - 73)  BP: 138/70 (2024 04:00) (138/70 - 166/85)  BP(mean): --  RR: 18 (:) (18 - 18)  SpO2: 97% (:) (68% - 97%)    Parameters below as of 2024 04:00  Patient On (Oxygen Delivery Method): room air      Daily     Daily Weight in k.7 (2024 08:31)    01-10 @ : @ 07:00  --------------------------------------------------------  IN: 180 mL / OUT: 1700 mL / NET: -1520 mL     @ 07:  -   @ 08:36  --------------------------------------------------------  IN: 100 mL / OUT: 0 mL / NET: 100 mL      Physical Exam:    wdwn female  no JVD  cor RRR  lung clear   abd soft   left breast less edema      LABS:                        9.4    4.70  )-----------( 225      ( 2024 05:07 )             27.0         137  |  101  |  22  ----------------------------<  129<H>  4.7   |  28  |  1.60<H>    Ca    8.7      2024 05:07    TPro  7.6  /  Alb  3.5  /  TBili  0.6  /  DBili  x   /  AST  51<H>  /  ALT  <29  /  AlkPhos  147<H>  01-10      Urinalysis Basic - ( 2024 05:07 )    Color: x / Appearance: x / SG: x / pH: x  Gluc: 129 mg/dL / Ketone: x  / Bili: x / Urobili: x   Blood: x / Protein: x / Nitrite: x   Leuk Esterase: x / RBC: x / WBC x   Sq Epi: x / Non Sq Epi: x / Bacteria: x        RADIOLOGY & ADDITIONAL TESTS:    TELE:    EKG:

## 2024-01-11 NOTE — CONSULT NOTE ADULT - CRITICAL CARE ATTENDING COMMENT
DOS 1/12  code stroke called and neurology emergently assessed patient  Nikhil   83-year-old RH woman with  chronic atrial fibrillation (on apixaban), HLD, T2DM, HTN, class III obesity, 2nd degree AV block, pacemaker, glaucoma of the left eye, for whom a CODE STROKE was called on 1/11/2024 - for slurred speech and L facial droop. Currently admitted for CHF. Also has Afib - on apixaban 2.5mg BID. Was apparently started on amiodarone during this admission - patient says medication makes her dizzy and makes her tongue feel heavy. Was speaking with her granddaughter this PM - who noticed sudden onset slurred speech. Primary team noted questionable L facial droop. RRT was called, then subsequently a CODE STROKE. Unable to obtain CTA/CTP d/t IV blew during CTA - patient difficult to obtain IV access (requires ultrasound). In addition, she has very poor renal function. Not able to receive tenecteplase d/t apixaban use with elevated INR. Patient's speech apparently back to baseline at end of CODE STROKE.    NO tenecteplase d/t took apixaban this AM and INR is 1.34.  NO thrombectomy d/t no LVO identified - unable to obtain CTA d/t no IV access and poor renal function (eGFR 26), pre-MRS is at least 3.     Recent vitals notable for: afebrile, HR 70, /77, RR 18, SpO2 98% on RA. Labs notable for: Hg 9.7, RDW 15.2, INR 1.34, BUN 24, SCr 1.88, glucose 186, TSH 2.39, UA: +glucose. CT imaging disclosed: multiple foci of low density that may represent ischemic changes of indeterminate age. Exam notable for questionable L NLF flattening, dysarthria (now at baseline), disconjugate gaze with inability to bury sclera when adducting R eye (?R MARY, ?chronic), edentulous.     NIHSS on admission: 3  LKN: 15:00, 1/11/2024  pre-MRS: at least 3    Impression: Questionable L facial weakness and dysarthria, d/t R brain dysfunction - possible acute ischemic infarct, mechanism pending workup (but may be cardioembolic d/t Afib), doubt new stroke on DOAC.  also wears dentures.  possible toxic metabolic, now at baseline per daughter     Recommendations:  [] Telemetry monitoring  [] Dysphagia screen  [] SBP goal per primary (hospitalized for acute on chronic HF) - please avoid hypotension  [] Continue apixaban 2.5mg BID, correct dose given age and Cr   [] Continue home atorvastatin 40mg (titrate LDL<70) daily  [] A1c, lipid panel  [] MRI brain w/wo, MRA head w/o, MRA neck w/wo - Patient extremely claustrophobic/tearful at thought of MRI - saying "no more tests." Can give anxiolytic or can consider repeat CT head in 24-48 hours and carotid dopplers as an alternative. unlikely to change mangagement   [] TTE performed 1/6/2024 - do NOT repeat (LVEF 60-65%, moderately dilated LA, no PFO)  [] Neurochecks, vitals q4h  [] Fall and aspiration precautions  [] PT/OT/SLP/CM  [] Diet: target is DASH/TLC  [] DVT prophylaxis: apixaban 2.5mg BID AND SCDs  [] Stroke education provided  [] Smoking cessation education not needed - nonsmoker  [] Please document MRS on discharge    spoke with daughter at bedside on 1/12   Michael Putnam MD  Vascular Neurology  Office: [phone] DOS 1/12  code stroke called and neurology emergently assessed patient  Nikhil   83-year-old RH woman with  chronic atrial fibrillation (on apixaban), HLD, T2DM, HTN, class III obesity, 2nd degree AV block, pacemaker, glaucoma of the left eye, for whom a CODE STROKE was called on 1/11/2024 - for slurred speech and L facial droop. Currently admitted for CHF. Also has Afib - on apixaban 2.5mg BID. Was apparently started on amiodarone during this admission - patient says medication makes her dizzy and makes her tongue feel heavy. Was speaking with her granddaughter this PM - who noticed sudden onset slurred speech. Primary team noted questionable L facial droop. RRT was called, then subsequently a CODE STROKE. Unable to obtain CTA/CTP d/t IV blew during CTA - patient difficult to obtain IV access (requires ultrasound). In addition, she has very poor renal function. Not able to receive tenecteplase d/t apixaban use with elevated INR. Patient's speech apparently back to baseline at end of CODE STROKE.    NO tenecteplase d/t took apixaban this AM and INR is 1.34.  NO thrombectomy d/t no LVO identified - unable to obtain CTA d/t no IV access and poor renal function (eGFR 26), pre-MRS is at least 3.     Recent vitals notable for: afebrile, HR 70, /77, RR 18, SpO2 98% on RA. Labs notable for: Hg 9.7, RDW 15.2, INR 1.34, BUN 24, SCr 1.88, glucose 186, TSH 2.39, UA: +glucose. CT imaging disclosed: multiple foci of low density that may represent ischemic changes of indeterminate age. Exam notable for questionable L NLF flattening, dysarthria (now at baseline), disconjugate gaze with inability to bury sclera when adducting R eye (?R MARY, ?chronic), edentulous.     NIHSS on admission: 3  LKN: 15:00, 1/11/2024  pre-MRS: at least 3    Impression: Questionable L facial weakness and dysarthria, d/t R brain dysfunction - possible acute ischemic infarct, mechanism pending workup (but may be cardioembolic d/t Afib), doubt new stroke on DOAC.  also wears dentures.  possible toxic metabolic, now at baseline per daughter     Recommendations:  [] Telemetry monitoring  [] Dysphagia screen  [] SBP goal per primary (hospitalized for acute on chronic HF) - please avoid hypotension  [] Continue apixaban 2.5mg BID, correct dose given age and Cr   [] Continue home atorvastatin 40mg (titrate LDL<70) daily  [] A1c, lipid panel  [] MRI brain w/wo, MRA head w/o, MRA neck w/wo - Patient extremely claustrophobic/tearful at thought of MRI - saying "no more tests." Can give anxiolytic or can consider repeat CT head in 24-48 hours and carotid dopplers as an alternative. unlikely to change mangagement   [] TTE performed 1/6/2024 - do NOT repeat (LVEF 60-65%, moderately dilated LA, no PFO)  [] Neurochecks, vitals q4h  [] Fall and aspiration precautions  [] PT/OT/SLP/CM  [] Diet: target is DASH/TLC  [] DVT prophylaxis: apixaban 2.5mg BID AND SCDs  [] Stroke education provided  [] Smoking cessation education not needed - nonsmoker  [] Please document MRS on discharge    spoke with daughter at bedside on 1/12   Michael Putnam MD  Vascular Neurology  Office: [phone]

## 2024-01-11 NOTE — PHARMACOTHERAPY INTERVENTION NOTE - COMMENTS
Counseled patient on the new medications started here in the hospital. Informed patients the indication, directions and side effects of the medications. Medication listed above.     aMIOdarone    Tablet 400 milliGRAM(s) Oral every 8 hours  apixaban 2.5 milliGRAM(s) Oral two times a day  atorvastatin 40 milliGRAM(s) Oral at bedtime  ferrous    sulfate 325 milliGRAM(s) Oral daily  furosemide    Tablet 40 milliGRAM(s) Oral daily  metoprolol succinate ER 25 milliGRAM(s) Oral daily  Nephro-patsy 1 Tablet(s) Oral daily    Requested Eliquis and Amiodarone prescriptions to be sent to [facility] pharmacy.     Alin (Jeffy Resendiz  Transitions of Care Pharmacist  Available on [employer] Teams (Preferred)  Spectra: [phone]      Time spent: 20 minutes  Counseled patient on the new medications started here in the hospital. Informed patients the indication, directions and side effects of the medications. Medication listed above.     aMIOdarone    Tablet 400 milliGRAM(s) Oral every 8 hours  apixaban 2.5 milliGRAM(s) Oral two times a day  atorvastatin 40 milliGRAM(s) Oral at bedtime  ferrous    sulfate 325 milliGRAM(s) Oral daily  furosemide    Tablet 40 milliGRAM(s) Oral daily  metoprolol succinate ER 25 milliGRAM(s) Oral daily  Nephro-patsy 1 Tablet(s) Oral daily    Requested Eliquis and Amiodarone prescriptions to be sent to [facility] pharmacy.     Alin (Jeffy Resendiz  Transitions of Care Pharmacist  Available on [employer] Teams (Preferred)  Spectra: [phone]      Time spent: 20 minutes

## 2024-01-11 NOTE — CONSULT NOTE ADULT - ASSESSMENT
83-year-old RHW, with PMH significant for chronic atrial fibrillation (on apixaban), HLD, T2DM, HTN, class III obesity, 2nd degree AV block, pacemaker, glaucoma of the left eye, for whom a CODE STROKE was called on 1/11/2024 - for slurred speech and L facial droop. Currently admitted for CHF. Also has Afib - on apixaban 2.5mg BID. Was apparently started on amiodarone during this admission - patient says medication makes her dizzy and makes her tongue feel heavy. Was speaking with her granddaughter this PM - who noticed sudden onset slurred speech. Primary team noted questionable L facial droop. RRT was called, then subsequently a CODE STROKE. Unable to obtain CTA/CTP d/t IV blew during CTA - patient difficult to obtain IV access (requires ultrasound). In addition, she has very poor renal function. Not able to receive tenecteplase d/t apixaban use with elevated INR. Patient's speech apparently back to baseline at end of CODE STROKE.    Recent vitals notable for: afebrile, HR 70, /77, RR 18, SpO2 98% on RA. Labs notable for: Hg 9.7, RDW 15.2, INR 1.34, BUN 24, SCr 1.88, glucose 186, TSH 2.39, UA: +glucose. CT imaging disclosed: multiple foci of low density that may represent ischemic changes of indeterminate age. Exam notable for questionable L NLF flattening, dysarthria (now at baseline), disconjugate gaze with inability to bury sclera when adducting R eye, edentulous.     NIHSS on admission: 3  LKN: 15:00, 1/11/2024  pre-MRS: at least 3    Impression: Questionable L facial weakness and dysarthria, d/t R brain dysfunction - possible acute ischemic infarct, mechanism pending workup (but may be cardioembolic d/t Afib)    Recommendations:  [] Remain on Medicine service  [] Telemetry monitoring  [] Dysphagia screen  [] SBP goal per primary (hospitalized for acute on chronic HF) - please avoid hypotension  [] Continue apixaban 2.5mg BID  [] Continue home atorvastatin 40mg (titrate LDL<70) daily  [] A1c, lipid panel  [] MRI brain w/wo, MRA head w/o, MRA neck w/wo - Patient extremely claustrophobic/tearful at thought of MRI - saying "no more tests." Can give anxiolytic or can consider repeat CT head in 24-48 hours and carotid dopplers as an alternative.  [] TTE performed 1/6/2024 - do NOT repeat (LVEF 60-65%, moderately dilated LA, no PFO)  [] Neurochecks, vitals q4h  [] Fall and aspiration precautions  [] PT/OT/SLP/CM  [] Diet: target is DASH/TLC  [] DVT prophylaxis: apixaban 2.5mg BID AND SCDs  [] Stroke education provided  [] Smoking cessation education not needed - nonsmoker  [] Please document MRS on discharge    NO tenecteplase d/t took apixaban this AM and INR is 1.34.  NO thrombectomy d/t no LVO identified - unable to obtain CTA d/t no IV access and poor renal function (eGFR 26), pre-MRS is at least 3.    Patient/plan d/w Stroke fellow, Dr. Romano, under the supervision of attending vascular neurologist Dr. Libman. To be seen by Vascular Neurology consults attending in the AM with attestation to follow. 83-year-old RHW, with PMH significant for chronic atrial fibrillation (on apixaban), HLD, T2DM, HTN, class III obesity, 2nd degree AV block, pacemaker, glaucoma of the left eye, for whom a CODE STROKE was called on 1/11/2024 - for slurred speech and L facial droop. Currently admitted for CHF. Also has Afib - on apixaban 2.5mg BID. Was apparently started on amiodarone during this admission - patient says medication makes her dizzy and makes her tongue feel heavy. Was speaking with her granddaughter this PM - who noticed sudden onset slurred speech. Primary team noted questionable L facial droop. RRT was called, then subsequently a CODE STROKE. Unable to obtain CTA/CTP d/t IV blew during CTA - patient difficult to obtain IV access (requires ultrasound). In addition, she has very poor renal function. Not able to receive tenecteplase d/t apixaban use with elevated INR. Patient's speech apparently back to baseline at end of CODE STROKE.    Recent vitals notable for: afebrile, HR 70, /77, RR 18, SpO2 98% on RA. Labs notable for: Hg 9.7, RDW 15.2, INR 1.34, BUN 24, SCr 1.88, glucose 186, TSH 2.39, UA: +glucose. CT imaging disclosed: multiple foci of low density that may represent ischemic changes of indeterminate age. Exam notable for questionable L NLF flattening, dysarthria (now at baseline), disconjugate gaze with inability to bury sclera when adducting R eye (?R MARY, ?chronic), edentulous.     NIHSS on admission: 3  LKN: 15:00, 1/11/2024  pre-MRS: at least 3    Impression: Questionable L facial weakness and dysarthria, d/t R brain dysfunction - possible acute ischemic infarct, mechanism pending workup (but may be cardioembolic d/t Afib)    Recommendations:  [] Remain on Medicine service  [] Telemetry monitoring  [] Dysphagia screen  [] SBP goal per primary (hospitalized for acute on chronic HF) - please avoid hypotension  [] Continue apixaban 2.5mg BID  [] Continue home atorvastatin 40mg (titrate LDL<70) daily  [] A1c, lipid panel  [] MRI brain w/wo, MRA head w/o, MRA neck w/wo - Patient extremely claustrophobic/tearful at thought of MRI - saying "no more tests." Can give anxiolytic or can consider repeat CT head in 24-48 hours and carotid dopplers as an alternative.  [] TTE performed 1/6/2024 - do NOT repeat (LVEF 60-65%, moderately dilated LA, no PFO)  [] Neurochecks, vitals q4h  [] Fall and aspiration precautions  [] PT/OT/SLP/CM  [] Diet: target is DASH/TLC  [] DVT prophylaxis: apixaban 2.5mg BID AND SCDs  [] Stroke education provided  [] Smoking cessation education not needed - nonsmoker  [] Please document MRS on discharge    NO tenecteplase d/t took apixaban this AM and INR is 1.34.  NO thrombectomy d/t no LVO identified - unable to obtain CTA d/t no IV access and poor renal function (eGFR 26), pre-MRS is at least 3.    Patient/plan d/w Stroke fellow, Dr. Romano, under the supervision of attending vascular neurologist Dr. Libman. To be seen by Vascular Neurology consults attending in the AM with attestation to follow.

## 2024-01-11 NOTE — CONSULT NOTE ADULT - SUBJECTIVE AND OBJECTIVE BOX
Neurology - Consult Note    -  Spectra: [phone] ([facility]), [phone] ([facility]). For new consults, please page: [phone] ([facility]), [phone] ([facility]).  -    HPI: Patient JUAN CARLOS SHAH is an 83y (1940) RIGHT handed woman for whom a CODE STROKE was called on 1/11/2024, for slurred speech and L facial droop.    PMH significant for: chronic atrial fibrillation (on apixaban), HLD, T2DM, HTN, class III obesity, 2nd degree AV block, pacemaker, glaucoma of the left eye     Patient is currently admitted to the Medicine service for management of acute on chronic CHF.  Patient also has a history of atrial fibrillation, for which she was apparently started on amiodarone during this admission. Patient also takes apixaban 2.5mg daily - last dose this AM.  Patient is also undergoing a workup for left breast abnormality.  Patient was reportedly to be discharged today.  However, patient's granddaughter, who has not seen her in a year and a half came to visit patient from [place].  Patient's granddaughter was talking to her - speech apparently normal.  Patient then went to sleep and woke up.  Patient was complaining of a dry mouth and per granddaughter, her voice sounded slurred.  Patient has complained that the amiodarone she was recently started on has made her tongue feel heavy and has made her feel dizzy.  The primary team noticed a questionable left facial droop.  Patient denies headache, current dizziness.  Patient reports that she has been urinating a lot.    On exam, patient has subjective dysarthria and a questionable left nasolabial fold flattening when compared to the right side.  Patient is agitated during exam and at times tearful.  When she is told that she is going down to the CT scanner, patient breaks down in tears saying that she cannot tolerate going into the scanner.  Patient was reassured that CT scan is performed very quickly.  Patient is unable to get angiography because her IV blew during her CTA scan.  Patient requires an ultrasound to place an IV, therefore she was unable to get a new IV.  In addition, patient's renal function is very poor.  NIH stroke scale was a 3 for mild dysarthria, mild left facial, disconjugate gaze.  Patient's daughter reports that the disconjugate gaze is baseline.  She also reports that patient's speech has improved at the end of the CODE STROKE.  Patient uses a walker to ambulate.  She wears diapers, although she can apparently toilet herself.  Patient is unable to climb stairs.  She requires a chairlift to take her up the stairs.  Patient is a never smoker.  Denies alcohol use and recreational drug use.  Patient currently lives with her daughter.    Review of Systems:  All other review of systems is negative unless indicated above.    Allergies:  lisinopril (Hives)  penicillin (Unknown)      PMHx/PSHx/Family Hx: As above, otherwise see below   Morbid obesity    HTN (hypertension)    Type 2 diabetes mellitus    HLD (hyperlipidemia)    2nd degree AV block    Glaucoma of left eye    Chronic atrial fibrillation    Pacemaker        Social Hx:  Per HPI    Medications:  MEDICATIONS  (STANDING):  aMIOdarone    Tablet   Oral   aMIOdarone    Tablet 400 milliGRAM(s) Oral every 8 hours  apixaban 2.5 milliGRAM(s) Oral two times a day  atorvastatin 40 milliGRAM(s) Oral at bedtime  brimonidine 0.2% Ophthalmic Solution 1 Drop(s) Both EYES daily  chlorhexidine 2% Cloths 1 Application(s) Topical daily  dextrose 5%. 1000 milliLiter(s) (100 mL/Hr) IV Continuous <Continuous>  dextrose 5%. 1000 milliLiter(s) (50 mL/Hr) IV Continuous <Continuous>  dextrose 50% Injectable 25 Gram(s) IV Push once  dextrose 50% Injectable 12.5 Gram(s) IV Push once  dextrose 50% Injectable 25 Gram(s) IV Push once  ferrous    sulfate 325 milliGRAM(s) Oral daily  furosemide    Tablet 40 milliGRAM(s) Oral daily  glucagon  Injectable 1 milliGRAM(s) IntraMuscular once  hydrocortisone 2.5% Ointment 1 Application(s) Topical two times a day  insulin glargine Injectable (LANTUS) 10 Unit(s) SubCutaneous at bedtime  insulin glargine Injectable (LANTUS) 20 Unit(s) SubCutaneous every morning  insulin lispro (ADMELOG) corrective regimen sliding scale   SubCutaneous three times a day before meals  insulin lispro (ADMELOG) corrective regimen sliding scale   SubCutaneous at bedtime  metoprolol succinate ER 25 milliGRAM(s) Oral daily  Nephro-patsy 1 Tablet(s) Oral daily    MEDICATIONS  (PRN):  acetaminophen     Tablet .. 650 milliGRAM(s) Oral every 6 hours PRN Temp greater or equal to 38.5C (101.3F), Mild Pain (1 - 3)  albuterol    90 MICROgram(s) HFA Inhaler 2 Puff(s) Inhalation every 6 hours PRN Shortness of Breath and/or Wheezing  benzonatate 100 milliGRAM(s) Oral every 8 hours PRN Cough  dextrose Oral Gel 15 Gram(s) Oral once PRN Blood Glucose LESS THAN 70 milliGRAM(s)/deciliter  guaiFENesin Oral Liquid (Sugar-Free) 100 milliGRAM(s) Oral every 6 hours PRN Cough      Vitals:  T(C): 36.5 (01-11-24 @ 11:30), Max: 36.8 (01-10-24 @ 20:42)  HR: 70 (01-11-24 @ 11:30) (69 - 70)  BP: 126/77 (01-11-24 @ 11:30) (126/77 - 167/84)  RR: 18 (01-11-24 @ 11:30) (18 - 18)  SpO2: 98% (01-11-24 @ 11:30) (94% - 98%)    Physical Examination: Limited by patient participation  General - Sitting up in bed, at times agitated and tearful  Cardiovascular - Significant edema in all 4 extremities  HEENT - Non-injected conjunctiva, anicteric sclera, edentulous    Neurologic Exam:  Mental status:  - Awake, Alert  - Oriented to: person and time. [place]s [place] but is not sure which one.  - Speech: fluent  - Repetition: intact   - Follows simple commands     Cranial nerves - pupils are minimally reactive, patient is blind in L eye and only sees shadows in R eye - no reliable BTT, disconjugate gaze - cannot adduct R eye fully, face sensation (V1-V3) intact b/l, questionable L NLF flattening, hearing grossly intact, palate with symmetric elevation, shoulder shrug intact b/l, tongue midline on protrusion with full lateral movement    Motor - Normal bulk and tone throughout. Did not cooperate with pronator drift test.  UEs - no drift x 10 seconds b/l  LEs - no drift x 5 seconds b/l    Sensation - Light touch intact throughout    DTRs (R/L)  Deferred d/t focused neurologic exam    Coordination - Struggles to perform FTN d/t blindness.    Gait and station - Did not assess d/t fall risk/safety concerns.    Labs:                        9.7    4.82  )-----------( 231      ( 11 Jan 2024 15:53 )             28.1     01-11    136  |  97  |  24<H>  ----------------------------<  186<H>  4.5   |  29  |  1.88<H>    Ca    9.2      11 Jan 2024 15:38    TPro  7.6  /  Alb  3.5  /  TBili  0.6  /  DBili  x   /  AST  51<H>  /  ALT  <29  /  AlkPhos  147<H>  01-10    CAPILLARY BLOOD GLUCOSE      POCT Blood Glucose.: 199 mg/dL (11 Jan 2024 15:23)    LIVER FUNCTIONS - ( 10 Darrick 2024 06:23 )  Alb: 3.5 g/dL / Pro: 7.6 g/dL / ALK PHOS: 147 U/L / ALT: <29 U/L / AST: 51 U/L / GGT: x             PT/INR - ( 11 Jan 2024 15:53 )   PT: 14.6 sec;   INR: 1.34 ratio         PTT - ( 11 Jan 2024 15:53 )  PTT:33.6 sec  CSF:                  Radiology:  CT BRAIN STROKE PROTOCOL    PROCEDURE DATE:  01/11/2024      INTERPRETATION:  Noncontrast CT of the brain.    CLINICAL INDICATION:  Code stroke, left facial droop and slurred speech.    TECHNIQUE : Axial CT scanning of the brain was obtained from the skull   base to the vertex without the administration of intravenous contrast.   Sagittal and coronal reformats were provided.    COMPARISON: None available    FINDINGS:    No hydrocephalus, midline shift, or effacement of the basal cisterns.    No acute intracranial hemorrhage.    Foci of low density in the bilateral basal ganglia regions, left   thalamus, left pontomedullary junction, central medulla, and right   cerebellar hemisphere which may represent ischemic changes of   indeterminate age.    Mild white matter microvascular ischemic disease.    The visualized paranasal sinuses and mastoid air cells are clear.    IMPRESSION:    Foci of low density in the bilateral basal ganglia regions, left   thalamus, left pontomedullary junction, central medulla, and right   cerebellar hemisphere which may represent ischemic changes of   indeterminate age.    No acute intracranial hemorrhage.   Neurology - Consult Note    -  Spectra: [phone] ([facility]), [phone] ([facility]). For new consults, please page: [phone] ([facility]), [phone] ([facility]).  -    HPI: Patient JUAN CARLOS SHAH is an 83y (1940) RIGHT handed woman for whom a CODE STROKE was called on 1/11/2024, for slurred speech and L facial droop.    PMH significant for: chronic atrial fibrillation (on apixaban), HLD, T2DM, HTN, class III obesity, 2nd degree AV block, pacemaker, glaucoma of the left eye     Patient is currently admitted to the Medicine service for management of acute on chronic CHF.  Patient also has a history of atrial fibrillation, for which she was apparently started on amiodarone during this admission. Patient also takes apixaban 2.5mg daily - last dose this AM.  Patient is also undergoing a workup for left breast abnormality.  Patient was reportedly to be discharged today.  However, patient's granddaughter, who has not seen her in a year and a half came to visit patient from [place].  Patient's granddaughter was talking to her - speech apparently normal.  Patient then went to sleep and woke up.  Patient was complaining of a dry mouth and per granddaughter, her voice sounded slurred.  Patient has complained that the amiodarone she was recently started on has made her tongue feel heavy and has made her feel dizzy.  The primary team noticed a questionable left facial droop.  Patient denies headache, current dizziness.  Patient reports that she has been urinating a lot.    On exam, patient has subjective dysarthria and a questionable left nasolabial fold flattening when compared to the right side.  Patient is agitated during exam and at times tearful.  When she is told that she is going down to the CT scanner, patient breaks down in tears saying that she cannot tolerate going into the scanner.  Patient was reassured that CT scan is performed very quickly.  Patient is unable to get angiography because her IV blew during her CTA scan.  Patient requires an ultrasound to place an IV, therefore she was unable to get a new IV.  In addition, patient's renal function is very poor.  NIH stroke scale was a 3 for mild dysarthria, mild left facial, disconjugate gaze.  Patient's daughter reports that the disconjugate gaze is baseline.  She also reports that patient's speech has improved at the end of the CODE STROKE.  Patient uses a walker to ambulate.  She wears diapers, although she can apparently toilet herself.  Patient is unable to climb stairs.  She requires a chairlift to take her up the stairs.  Patient is a never smoker.  Denies alcohol use and recreational drug use.  Patient currently lives with her daughter.    Review of Systems:  All other review of systems is negative unless indicated above.    Allergies:  lisinopril (Hives)  penicillin (Unknown)      PMHx/PSHx/Family Hx: As above, otherwise see below   Morbid obesity    HTN (hypertension)    Type 2 diabetes mellitus    HLD (hyperlipidemia)    2nd degree AV block    Glaucoma of left eye    Chronic atrial fibrillation    Pacemaker        Social Hx:  Per HPI    Medications:  MEDICATIONS  (STANDING):  aMIOdarone    Tablet   Oral   aMIOdarone    Tablet 400 milliGRAM(s) Oral every 8 hours  apixaban 2.5 milliGRAM(s) Oral two times a day  atorvastatin 40 milliGRAM(s) Oral at bedtime  brimonidine 0.2% Ophthalmic Solution 1 Drop(s) Both EYES daily  chlorhexidine 2% Cloths 1 Application(s) Topical daily  dextrose 5%. 1000 milliLiter(s) (100 mL/Hr) IV Continuous <Continuous>  dextrose 5%. 1000 milliLiter(s) (50 mL/Hr) IV Continuous <Continuous>  dextrose 50% Injectable 25 Gram(s) IV Push once  dextrose 50% Injectable 12.5 Gram(s) IV Push once  dextrose 50% Injectable 25 Gram(s) IV Push once  ferrous    sulfate 325 milliGRAM(s) Oral daily  furosemide    Tablet 40 milliGRAM(s) Oral daily  glucagon  Injectable 1 milliGRAM(s) IntraMuscular once  hydrocortisone 2.5% Ointment 1 Application(s) Topical two times a day  insulin glargine Injectable (LANTUS) 10 Unit(s) SubCutaneous at bedtime  insulin glargine Injectable (LANTUS) 20 Unit(s) SubCutaneous every morning  insulin lispro (ADMELOG) corrective regimen sliding scale   SubCutaneous three times a day before meals  insulin lispro (ADMELOG) corrective regimen sliding scale   SubCutaneous at bedtime  metoprolol succinate ER 25 milliGRAM(s) Oral daily  Nephro-patsy 1 Tablet(s) Oral daily    MEDICATIONS  (PRN):  acetaminophen     Tablet .. 650 milliGRAM(s) Oral every 6 hours PRN Temp greater or equal to 38.5C (101.3F), Mild Pain (1 - 3)  albuterol    90 MICROgram(s) HFA Inhaler 2 Puff(s) Inhalation every 6 hours PRN Shortness of Breath and/or Wheezing  benzonatate 100 milliGRAM(s) Oral every 8 hours PRN Cough  dextrose Oral Gel 15 Gram(s) Oral once PRN Blood Glucose LESS THAN 70 milliGRAM(s)/deciliter  guaiFENesin Oral Liquid (Sugar-Free) 100 milliGRAM(s) Oral every 6 hours PRN Cough      Vitals:  T(C): 36.5 (01-11-24 @ 11:30), Max: 36.8 (01-10-24 @ 20:42)  HR: 70 (01-11-24 @ 11:30) (69 - 70)  BP: 126/77 (01-11-24 @ 11:30) (126/77 - 167/84)  RR: 18 (01-11-24 @ 11:30) (18 - 18)  SpO2: 98% (01-11-24 @ 11:30) (94% - 98%)    Physical Examination: Limited by patient participation  General - Sitting up in bed, at times agitated and tearful  Cardiovascular - Significant edema in all 4 extremities  HEENT - Non-injected conjunctiva, anicteric sclera, edentulous    Neurologic Exam:  Mental status:  - Awake, Alert  - Oriented to: person and time. [place]s [place] but is not sure which one.  - Speech: fluent  - Repetition: intact   - Follows simple commands     Cranial nerves - pupils are minimally reactive, patient is blind in L eye and only sees shadows in R eye - no reliable BTT, disconjugate gaze - cannot adduct R eye fully, face sensation (V1-V3) intact b/l, questionable L NLF flattening, hearing grossly intact, palate with symmetric elevation, shoulder shrug intact b/l, tongue midline on protrusion with full lateral movement    Motor - Normal bulk and tone throughout. Did not cooperate with pronator drift test.  UEs - no drift x 10 seconds b/l  LEs - no drift x 5 seconds b/l    Sensation - Light touch intact throughout    DTRs (R/L)  Deferred d/t focused neurologic exam    Coordination - Struggles to perform FTN d/t blindness.    Gait and station - Did not assess d/t fall risk/safety concerns.    Labs:                        9.7    4.82  )-----------( 231      ( 11 Jan 2024 15:53 )             28.1     01-11    136  |  97  |  24<H>  ----------------------------<  186<H>  4.5   |  29  |  1.88<H>    Ca    9.2      11 Jan 2024 15:38    TPro  7.6  /  Alb  3.5  /  TBili  0.6  /  DBili  x   /  AST  51<H>  /  ALT  <29  /  AlkPhos  147<H>  01-10    CAPILLARY BLOOD GLUCOSE      POCT Blood Glucose.: 199 mg/dL (11 Jan 2024 15:23)    LIVER FUNCTIONS - ( 10 Darrick 2024 06:23 )  Alb: 3.5 g/dL / Pro: 7.6 g/dL / ALK PHOS: 147 U/L / ALT: <29 U/L / AST: 51 U/L / GGT: x             PT/INR - ( 11 Jan 2024 15:53 )   PT: 14.6 sec;   INR: 1.34 ratio         PTT - ( 11 Jan 2024 15:53 )  PTT:33.6 sec  CSF:                  Radiology:  CT BRAIN STROKE PROTOCOL    PROCEDURE DATE:  01/11/2024      INTERPRETATION:  Noncontrast CT of the brain.    CLINICAL INDICATION:  Code stroke, left facial droop and slurred speech.    TECHNIQUE : Axial CT scanning of the brain was obtained from the skull   base to the vertex without the administration of intravenous contrast.   Sagittal and coronal reformats were provided.    COMPARISON: None available    FINDINGS:    No hydrocephalus, midline shift, or effacement of the basal cisterns.    No acute intracranial hemorrhage.    Foci of low density in the bilateral basal ganglia regions, left   thalamus, left pontomedullary junction, central medulla, and right   cerebellar hemisphere which may represent ischemic changes of   indeterminate age.    Mild white matter microvascular ischemic disease.    The visualized paranasal sinuses and mastoid air cells are clear.    IMPRESSION:    Foci of low density in the bilateral basal ganglia regions, left   thalamus, left pontomedullary junction, central medulla, and right   cerebellar hemisphere which may represent ischemic changes of   indeterminate age.    No acute intracranial hemorrhage.

## 2024-01-11 NOTE — DISCHARGE NOTE NURSING/CASE MANAGEMENT/SOCIAL WORK - NSDCCRNAME_GEN_ALL_CORE_FT
[facility]  [facility]

## 2024-01-11 NOTE — CONSULT NOTE ADULT - CONSULT REQUESTED DATE/TIME
09-Jan-2024 15:25
08-Jan-2024 10:34
08-Jan-2024 12:07
08-Jan-2024 12:56
11-Jan-2024 15:27
06-Jan-2024 09:59
09-Jan-2024 09:29

## 2024-01-11 NOTE — PROGRESS NOTE ADULT - ASSESSMENT
83 f with  HTN HLD mild dementia, known cardiomyopathy, baseline creainine unknown , PPM with short bursts PAF, metoprolol and diuretics had been stopped by another provider, possible interstitial lung disease,  with dyspnea, elevated BNP, elevated creatiine, bilateral LCE edema nad left arm /left breast edema. ECHO LV fx preserved.  found iron deficient anemia. Having sustained atrial tachycardia    Acute on Chronic Diastolic  Congestive Heart Failure  - lasix restarted by nephrology  -BP was borderline, thus hydralazine was held, IF BP elevated ,would restaert hydralazine 10 TID  - f u creatinine    SSS/ PAF  - restart eliquis low dose 2.5 BID, watch HB  -amiodarone 400 TID , then 200 mg daily     Breast swelling  -no evidence of malignancy    Anemia  - consider iron transfusion,   - anticoagulation stopped    - hold hydralazine  -recheck creatinine in AM

## 2024-01-11 NOTE — STROKE CODE NOTE - NSSTROKETEAMACTIVATEDDT_GEN_A_CORE
Pt was reviewed by a multidisciplinary team and it was decided that the pt would be a candidate for BPA.  She will be high risk and it was discussed that Dr. Paniagua needs to talk to her in detail the risks.  Message sent to team with update. Susan Foley RN on 12/18/2019 at 5:45 PM   11-Jan-2024 15:27

## 2024-01-11 NOTE — PROGRESS NOTE ADULT - ASSESSMENT
iron deficiency anemia   congestive heart failure   CKD    cbc daily   no signs of GI bleeding   occult negative   diuresis per cardiology   no plans for EGD/colonoscopy at this time  dc planning per primary   d/w pt and daughter at bedside    I reviewed the overnight course of events on the unit, re-confirming the patient history. I discussed the care with the patient and their family  The plan of care was discussed with the physician assistant and modifications were made to the notation where appropriate.   Differential diagnosis and plan of care discussed with patient after the evaluation  50 minutes spent on total encounter of which more than fifty percent of the encounter was spent counseling and/or coordinating care by the attending physician.  Advanced care planning was discussed with patient and family.  Advanced care planning forms were reviewed and discussed.  Risks, benefits and alternatives of gastroenterologic procedures were discussed in detail and all questions were answered.

## 2024-01-12 LAB
ANION GAP SERPL CALC-SCNC: 10 MMOL/L — SIGNIFICANT CHANGE UP (ref 5–17)
ANION GAP SERPL CALC-SCNC: 10 MMOL/L — SIGNIFICANT CHANGE UP (ref 5–17)
BUN SERPL-MCNC: 25 MG/DL — HIGH (ref 7–23)
BUN SERPL-MCNC: 25 MG/DL — HIGH (ref 7–23)
CALCIUM SERPL-MCNC: 9.1 MG/DL — SIGNIFICANT CHANGE UP (ref 8.4–10.5)
CALCIUM SERPL-MCNC: 9.1 MG/DL — SIGNIFICANT CHANGE UP (ref 8.4–10.5)
CHLORIDE SERPL-SCNC: 98 MMOL/L — SIGNIFICANT CHANGE UP (ref 96–108)
CHLORIDE SERPL-SCNC: 98 MMOL/L — SIGNIFICANT CHANGE UP (ref 96–108)
CO2 SERPL-SCNC: 28 MMOL/L — SIGNIFICANT CHANGE UP (ref 22–31)
CO2 SERPL-SCNC: 28 MMOL/L — SIGNIFICANT CHANGE UP (ref 22–31)
CREAT SERPL-MCNC: 1.77 MG/DL — HIGH (ref 0.5–1.3)
CREAT SERPL-MCNC: 1.77 MG/DL — HIGH (ref 0.5–1.3)
EGFR: 28 ML/MIN/1.73M2 — LOW
EGFR: 28 ML/MIN/1.73M2 — LOW
GLUCOSE BLDC GLUCOMTR-MCNC: 139 MG/DL — HIGH (ref 70–99)
GLUCOSE BLDC GLUCOMTR-MCNC: 139 MG/DL — HIGH (ref 70–99)
GLUCOSE BLDC GLUCOMTR-MCNC: 141 MG/DL — HIGH (ref 70–99)
GLUCOSE BLDC GLUCOMTR-MCNC: 141 MG/DL — HIGH (ref 70–99)
GLUCOSE BLDC GLUCOMTR-MCNC: 194 MG/DL — HIGH (ref 70–99)
GLUCOSE BLDC GLUCOMTR-MCNC: 194 MG/DL — HIGH (ref 70–99)
GLUCOSE BLDC GLUCOMTR-MCNC: 267 MG/DL — HIGH (ref 70–99)
GLUCOSE BLDC GLUCOMTR-MCNC: 267 MG/DL — HIGH (ref 70–99)
GLUCOSE SERPL-MCNC: 113 MG/DL — HIGH (ref 70–99)
GLUCOSE SERPL-MCNC: 113 MG/DL — HIGH (ref 70–99)
HCT VFR BLD CALC: 26.2 % — LOW (ref 34.5–45)
HCT VFR BLD CALC: 26.2 % — LOW (ref 34.5–45)
HGB BLD-MCNC: 9.2 G/DL — LOW (ref 11.5–15.5)
HGB BLD-MCNC: 9.2 G/DL — LOW (ref 11.5–15.5)
MCHC RBC-ENTMCNC: 27.8 PG — SIGNIFICANT CHANGE UP (ref 27–34)
MCHC RBC-ENTMCNC: 27.8 PG — SIGNIFICANT CHANGE UP (ref 27–34)
MCHC RBC-ENTMCNC: 35.1 GM/DL — SIGNIFICANT CHANGE UP (ref 32–36)
MCHC RBC-ENTMCNC: 35.1 GM/DL — SIGNIFICANT CHANGE UP (ref 32–36)
MCV RBC AUTO: 79.2 FL — LOW (ref 80–100)
MCV RBC AUTO: 79.2 FL — LOW (ref 80–100)
NRBC # BLD: 0 /100 WBCS — SIGNIFICANT CHANGE UP (ref 0–0)
NRBC # BLD: 0 /100 WBCS — SIGNIFICANT CHANGE UP (ref 0–0)
PLATELET # BLD AUTO: 206 K/UL — SIGNIFICANT CHANGE UP (ref 150–400)
PLATELET # BLD AUTO: 206 K/UL — SIGNIFICANT CHANGE UP (ref 150–400)
POTASSIUM SERPL-MCNC: 4.1 MMOL/L — SIGNIFICANT CHANGE UP (ref 3.5–5.3)
POTASSIUM SERPL-MCNC: 4.1 MMOL/L — SIGNIFICANT CHANGE UP (ref 3.5–5.3)
POTASSIUM SERPL-SCNC: 4.1 MMOL/L — SIGNIFICANT CHANGE UP (ref 3.5–5.3)
POTASSIUM SERPL-SCNC: 4.1 MMOL/L — SIGNIFICANT CHANGE UP (ref 3.5–5.3)
RBC # BLD: 3.31 M/UL — LOW (ref 3.8–5.2)
RBC # BLD: 3.31 M/UL — LOW (ref 3.8–5.2)
RBC # FLD: 15.1 % — HIGH (ref 10.3–14.5)
RBC # FLD: 15.1 % — HIGH (ref 10.3–14.5)
SODIUM SERPL-SCNC: 136 MMOL/L — SIGNIFICANT CHANGE UP (ref 135–145)
SODIUM SERPL-SCNC: 136 MMOL/L — SIGNIFICANT CHANGE UP (ref 135–145)
WBC # BLD: 4.46 K/UL — SIGNIFICANT CHANGE UP (ref 3.8–10.5)
WBC # BLD: 4.46 K/UL — SIGNIFICANT CHANGE UP (ref 3.8–10.5)
WBC # FLD AUTO: 4.46 K/UL — SIGNIFICANT CHANGE UP (ref 3.8–10.5)
WBC # FLD AUTO: 4.46 K/UL — SIGNIFICANT CHANGE UP (ref 3.8–10.5)

## 2024-01-12 PROCEDURE — 70450 CT HEAD/BRAIN W/O DYE: CPT | Mod: 26

## 2024-01-12 PROCEDURE — 93880 EXTRACRANIAL BILAT STUDY: CPT | Mod: 26

## 2024-01-12 RX ORDER — ACETAMINOPHEN 500 MG
1000 TABLET ORAL ONCE
Refills: 0 | Status: COMPLETED | OUTPATIENT
Start: 2024-01-12 | End: 2024-01-13

## 2024-01-12 RX ADMIN — APIXABAN 2.5 MILLIGRAM(S): 2.5 TABLET, FILM COATED ORAL at 05:26

## 2024-01-12 RX ADMIN — Medication 25 MILLIGRAM(S): at 05:26

## 2024-01-12 RX ADMIN — Medication 650 MILLIGRAM(S): at 13:12

## 2024-01-12 RX ADMIN — Medication 325 MILLIGRAM(S): at 12:05

## 2024-01-12 RX ADMIN — CHLORHEXIDINE GLUCONATE 1 APPLICATION(S): 213 SOLUTION TOPICAL at 12:05

## 2024-01-12 RX ADMIN — Medication 650 MILLIGRAM(S): at 07:57

## 2024-01-12 RX ADMIN — Medication 650 MILLIGRAM(S): at 01:51

## 2024-01-12 RX ADMIN — ATORVASTATIN CALCIUM 40 MILLIGRAM(S): 80 TABLET, FILM COATED ORAL at 21:53

## 2024-01-12 RX ADMIN — AMIODARONE HYDROCHLORIDE 400 MILLIGRAM(S): 400 TABLET ORAL at 05:26

## 2024-01-12 RX ADMIN — Medication 650 MILLIGRAM(S): at 14:12

## 2024-01-12 RX ADMIN — Medication 1 APPLICATION(S): at 17:38

## 2024-01-12 RX ADMIN — Medication 3: at 08:30

## 2024-01-12 RX ADMIN — Medication 1 APPLICATION(S): at 05:26

## 2024-01-12 RX ADMIN — Medication 650 MILLIGRAM(S): at 08:57

## 2024-01-12 RX ADMIN — Medication 650 MILLIGRAM(S): at 21:42

## 2024-01-12 RX ADMIN — INSULIN GLARGINE 10 UNIT(S): 100 INJECTION, SOLUTION SUBCUTANEOUS at 21:54

## 2024-01-12 RX ADMIN — AMIODARONE HYDROCHLORIDE 400 MILLIGRAM(S): 400 TABLET ORAL at 17:38

## 2024-01-12 RX ADMIN — Medication 40 MILLIGRAM(S): at 05:26

## 2024-01-12 RX ADMIN — AMIODARONE HYDROCHLORIDE 400 MILLIGRAM(S): 400 TABLET ORAL at 21:53

## 2024-01-12 RX ADMIN — INSULIN GLARGINE 20 UNIT(S): 100 INJECTION, SOLUTION SUBCUTANEOUS at 08:29

## 2024-01-12 RX ADMIN — APIXABAN 2.5 MILLIGRAM(S): 2.5 TABLET, FILM COATED ORAL at 17:38

## 2024-01-12 RX ADMIN — Medication 1 TABLET(S): at 12:04

## 2024-01-12 RX ADMIN — BRIMONIDINE TARTRATE 1 DROP(S): 2 SOLUTION/ DROPS OPHTHALMIC at 12:04

## 2024-01-12 NOTE — PROGRESS NOTE ADULT - ASSESSMENT
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, 1/5/24 p/w SOB/L-sided swelling    (1)Renal - nonproteinuric CKD3b - likely due to hypertension/microvascular disease; prerenally mediated fluctuations in creatinine since admission    (2)Lytes - acceptable    (3)CV - L-sided swelling - likely primarily due to distributive changes associated with inflammatory breast CA    (4)Surgery - concern for inflammatory L breast CA - eval noted    (5)Neuro - AMS - eval noted, repeat CT head and carotid dopplers pending     RECOMMEND:  (1)Lasix 40mg po qd as ordered    Idalia Clifford DNP  [facility]  [phone]          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, 1/5/24 p/w SOB/L-sided swelling    (1)Renal - nonproteinuric CKD3b - likely due to hypertension/microvascular disease; prerenally mediated fluctuations in creatinine since admission    (2)Lytes - acceptable    (3)CV - L-sided swelling - likely primarily due to distributive changes associated with inflammatory breast CA    (4)Surgery - concern for inflammatory L breast CA - eval noted    (5)Neuro - AMS - eval noted, repeat CT head and carotid dopplers pending     RECOMMEND:  (1)Lasix 40mg po qd as ordered    Idalia Clifford DNP  [facility]  [phone]          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, 1/5/24 p/w SOB/L-sided swelling    (1)Renal - nonproteinuric CKD3b - likely due to hypertension/microvascular disease; prerenally mediated fluctuations in creatinine since admission    (2)Lytes - acceptable    (3)CV - L-sided swelling - likely primarily due to distributive changes associated with inflammatory breast CA    (4)Surgery - concern for inflammatory L breast CA - eval noted    (5)Neuro - AMS - eval noted, repeat CT head and carotid dopplers pending     RECOMMEND:  (1)Lasix 40mg po qd as ordered    Idalia Clifford DNP  [facility]  [phone]     RENAL ATTENDING NOTE  Patient seen and examined with NP. Agree with assessment and plan as above.    Yoel Suarez MD  [facility]  [phone]     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, 1/5/24 p/w SOB/L-sided swelling    (1)Renal - nonproteinuric CKD3b - likely due to hypertension/microvascular disease; prerenally mediated fluctuations in creatinine since admission    (2)Lytes - acceptable    (3)CV - L-sided swelling - likely primarily due to distributive changes associated with inflammatory breast CA    (4)Surgery - concern for inflammatory L breast CA - eval noted    (5)Neuro - AMS - eval noted, repeat CT head and carotid dopplers pending     RECOMMEND:  (1)Lasix 40mg po qd as ordered    Idalia Clifford DNP  [facility]  [phone]     RENAL ATTENDING NOTE  Patient seen and examined with NP. Agree with assessment and plan as above.    Yoel Suarez MD  [facility]  [phone]

## 2024-01-12 NOTE — PROGRESS NOTE ADULT - ASSESSMENT
83 f with     CVA vs TIA  - Neurology follow  - CT head noted  - Carotid Dopplers pending   - MRI brain as OTP ( refused)    Acute on Chronic Systolic Congestive Heart Failure  - telemetry  - diurese   - echo noted  - cardiology follow    A Fib  - rate control  - Amiodarone  - AC with Eliquis 2.5 bid    L Breast abnormality  - US L breast noted   - Surgical evaluation noted  - Dermatology evaluation for possible bx noted  - Oncology evaluation noted  - further work up as OTP with PMD and possible Bx    Anemia iron deficiency  - follow Hct  - iron studies noted  - Iron supplementation   - Gastroenterology evaluation noted. No EGD/ Colon.    Diabetes Mellitus  - BS control  - ADA diet     CKD  - follow cr, lytes  - Nephrology follow Dr. Suarez    PPM  - check    HTN control    Glaucoma  - continue gtt     Obesity morbid  - nutrition education    PT    DVT prophylaxis  - PAS    d/w patient , daughter and ACP    DCP home in progress.      Jamal Ramachandran MD phone [phone]  83 f with     CVA vs TIA  - Neurology follow  - CT head noted  - Carotid Dopplers pending   - MRI brain as OTP ( refused)    Acute on Chronic Systolic Congestive Heart Failure  - telemetry  - diurese   - echo noted  - cardiology follow    A Fib  - rate control  - Amiodarone  - AC with Eliquis 2.5 bid    L Breast abnormality  - US L breast noted   - Surgical evaluation noted  - Dermatology evaluation for possible bx noted  - Oncology evaluation noted  - further work up as OTP with PMD and possible Bx    Anemia iron deficiency  - follow Hct  - iron studies noted  - Iron supplementation   - Gastroenterology evaluation noted. No EGD/ Colon.    Diabetes Mellitus  - BS control  - ADA diet     CKD  - follow cr, lytes  - Nephrology follow Dr. Suarez    PPM  - check    HTN control    Glaucoma  - continue gtt     Obesity morbid  - nutrition education    PT    DVT prophylaxis  - PAS    d/w patient , daughter and ACP    DCP home in progress.      Jamal Ramachandran MD phone [phone]

## 2024-01-12 NOTE — PROGRESS NOTE ADULT - SUBJECTIVE AND OBJECTIVE BOX
[facility]  Luisito Mario PA-C  [address] [place] [address]  [phone]      INTERVAL HPI/OVERNIGHT EVENTS:  pt seen and examined, events noted  s/p code stroke   hgb stable       MEDICATIONS  (STANDING):  atorvastatin 40 milliGRAM(s) Oral at bedtime  brimonidine 0.2% Ophthalmic Solution 1 Drop(s) Both EYES daily  chlorhexidine 2% Cloths 1 Application(s) Topical daily  dextrose 5%. 1000 milliLiter(s) (100 mL/Hr) IV Continuous <Continuous>  dextrose 5%. 1000 milliLiter(s) (50 mL/Hr) IV Continuous <Continuous>  dextrose 50% Injectable 25 Gram(s) IV Push once  dextrose 50% Injectable 12.5 Gram(s) IV Push once  dextrose 50% Injectable 25 Gram(s) IV Push once  ferrous    sulfate 325 milliGRAM(s) Oral daily  furosemide   Injectable 40 milliGRAM(s) IV Push daily  glucagon  Injectable 1 milliGRAM(s) IntraMuscular once  insulin glargine Injectable (LANTUS) 10 Unit(s) SubCutaneous at bedtime  insulin glargine Injectable (LANTUS) 20 Unit(s) SubCutaneous every morning  insulin lispro (ADMELOG) corrective regimen sliding scale   SubCutaneous three times a day before meals  insulin lispro (ADMELOG) corrective regimen sliding scale   SubCutaneous at bedtime  metoprolol succinate ER 25 milliGRAM(s) Oral daily  Nephro-patsy 1 Tablet(s) Oral daily    MEDICATIONS  (PRN):  acetaminophen     Tablet .. 650 milliGRAM(s) Oral every 6 hours PRN Temp greater or equal to 38.5C (101.3F), Mild Pain (1 - 3)  albuterol    90 MICROgram(s) HFA Inhaler 2 Puff(s) Inhalation every 6 hours PRN Shortness of Breath and/or Wheezing  benzonatate 100 milliGRAM(s) Oral every 8 hours PRN Cough  dextrose Oral Gel 15 Gram(s) Oral once PRN Blood Glucose LESS THAN 70 milliGRAM(s)/deciliter  guaiFENesin Oral Liquid (Sugar-Free) 100 milliGRAM(s) Oral every 6 hours PRN Cough      Allergies    lisinopril (Hives)  penicillin (Unknown)    Intolerances        ROS:   General:  No wt loss, fevers, chills, night sweats, fatigue,   Eyes:  Good vision, no reported pain  ENT:  No sore throat, pain, runny nose, dysphagia  CV:  No pain, palpitations, hypo/hypertension  Resp:  No dyspnea, cough, tachypnea, wheezing  GI:  No pain, No nausea, No vomiting, No diarrhea, No constipation, No weight loss, No fever, No pruritis, No rectal bleeding, No tarry stools, No dysphagia,  :  No pain, bleeding, incontinence, nocturia  Muscle:  No pain, weakness  Neuro:  No weakness, tingling, memory problems  Psych:  No fatigue, insomnia, mood problems, depression  Endocrine:  No polyuria, polydipsia, cold/heat intolerance  Heme:  No petechiae, ecchymosis, easy bruisability  Skin:  No rash, tattoos, scars, edema      PHYSICAL EXAM:   Vital Signs Last 24 Hrs  T(C): 36.4 (24 @ 11:18), Max: 36.8 (24 @ 20:47)  T(F): 97.6 (24 @ 11:18), Max: 98.3 (24 @ 04:37)  HR: 96 (24 @ 11:18) (60 - 96)  BP: 135/62 (24 @ 11:18) (106/69 - 143/68)  BP(mean): --  RR: 18 (24 @ 11:18) (18 - 18)  SpO2: 96% (24 @ 11:18) (95% - 96%)        Daily     Daily Weight in k.5 (2024 08:19)    GENERAL:  Appears stated age,   HEENT:  NC/AT,    CHEST:  Full & symmetric excursion,   HEART:  Regular rhythm,  ABDOMEN:  Soft, non-tender, non-distended,  EXTEREMITIES:  no cyanosis  SKIN:  No rash  NEURO:  Alert,       LABS:                        9.2    4.46  )-----------( 206      ( 2024 07:11 )             26.2       136  |  98  |  25<H>  ----------------------------<  113<H>  4.1   |  28  |  1.77<H>    Ca    9.1      2024 07:11        Urinalysis Basic - ( 2024 06:29 )    Color: x / Appearance: x / SG: x / pH: x  Gluc: 192 mg/dL / Ketone: x  / Bili: x / Urobili: x   Blood: x / Protein: x / Nitrite: x   Leuk Esterase: x / RBC: x / WBC x   Sq Epi: x / Non Sq Epi: x / Bacteria: x        RADIOLOGY & ADDITIONAL TESTS:   [facility]  Luisito Mario PA-C  [address] [place] [address]  [phone]      INTERVAL HPI/OVERNIGHT EVENTS:  pt seen and examined, events noted  s/p code stroke   hgb stable       MEDICATIONS  (STANDING):  atorvastatin 40 milliGRAM(s) Oral at bedtime  brimonidine 0.2% Ophthalmic Solution 1 Drop(s) Both EYES daily  chlorhexidine 2% Cloths 1 Application(s) Topical daily  dextrose 5%. 1000 milliLiter(s) (100 mL/Hr) IV Continuous <Continuous>  dextrose 5%. 1000 milliLiter(s) (50 mL/Hr) IV Continuous <Continuous>  dextrose 50% Injectable 25 Gram(s) IV Push once  dextrose 50% Injectable 12.5 Gram(s) IV Push once  dextrose 50% Injectable 25 Gram(s) IV Push once  ferrous    sulfate 325 milliGRAM(s) Oral daily  furosemide   Injectable 40 milliGRAM(s) IV Push daily  glucagon  Injectable 1 milliGRAM(s) IntraMuscular once  insulin glargine Injectable (LANTUS) 10 Unit(s) SubCutaneous at bedtime  insulin glargine Injectable (LANTUS) 20 Unit(s) SubCutaneous every morning  insulin lispro (ADMELOG) corrective regimen sliding scale   SubCutaneous three times a day before meals  insulin lispro (ADMELOG) corrective regimen sliding scale   SubCutaneous at bedtime  metoprolol succinate ER 25 milliGRAM(s) Oral daily  Nephro-patsy 1 Tablet(s) Oral daily    MEDICATIONS  (PRN):  acetaminophen     Tablet .. 650 milliGRAM(s) Oral every 6 hours PRN Temp greater or equal to 38.5C (101.3F), Mild Pain (1 - 3)  albuterol    90 MICROgram(s) HFA Inhaler 2 Puff(s) Inhalation every 6 hours PRN Shortness of Breath and/or Wheezing  benzonatate 100 milliGRAM(s) Oral every 8 hours PRN Cough  dextrose Oral Gel 15 Gram(s) Oral once PRN Blood Glucose LESS THAN 70 milliGRAM(s)/deciliter  guaiFENesin Oral Liquid (Sugar-Free) 100 milliGRAM(s) Oral every 6 hours PRN Cough      Allergies    lisinopril (Hives)  penicillin (Unknown)    Intolerances        ROS:   General:  No wt loss, fevers, chills, night sweats, fatigue,   Eyes:  Good vision, no reported pain  ENT:  No sore throat, pain, runny nose, dysphagia  CV:  No pain, palpitations, hypo/hypertension  Resp:  No dyspnea, cough, tachypnea, wheezing  GI:  No pain, No nausea, No vomiting, No diarrhea, No constipation, No weight loss, No fever, No pruritis, No rectal bleeding, No tarry stools, No dysphagia,  :  No pain, bleeding, incontinence, nocturia  Muscle:  No pain, weakness  Neuro:  No weakness, tingling, memory problems  Psych:  No fatigue, insomnia, mood problems, depression  Endocrine:  No polyuria, polydipsia, cold/heat intolerance  Heme:  No petechiae, ecchymosis, easy bruisability  Skin:  No rash, tattoos, scars, edema      PHYSICAL EXAM:   Vital Signs Last 24 Hrs  T(C): 36.4 (24 @ 11:18), Max: 36.8 (24 @ 20:47)  T(F): 97.6 (24 @ 11:18), Max: 98.3 (24 @ 04:37)  HR: 96 (24 @ 11:18) (60 - 96)  BP: 135/62 (24 @ 11:18) (106/69 - 143/68)  BP(mean): --  RR: 18 (24 @ 11:18) (18 - 18)  SpO2: 96% (24 @ 11:18) (95% - 96%)        Daily     Daily Weight in k.5 (2024 08:19)    GENERAL:  Appears stated age,   HEENT:  NC/AT,    CHEST:  Full & symmetric excursion,   HEART:  Regular rhythm,  ABDOMEN:  Soft, non-tender, non-distended,  EXTEREMITIES:  no cyanosis  SKIN:  No rash  NEURO:  Alert,       LABS:                        9.2    4.46  )-----------( 206      ( 2024 07:11 )             26.2       136  |  98  |  25<H>  ----------------------------<  113<H>  4.1   |  28  |  1.77<H>    Ca    9.1      2024 07:11        Urinalysis Basic - ( 2024 06:29 )    Color: x / Appearance: x / SG: x / pH: x  Gluc: 192 mg/dL / Ketone: x  / Bili: x / Urobili: x   Blood: x / Protein: x / Nitrite: x   Leuk Esterase: x / RBC: x / WBC x   Sq Epi: x / Non Sq Epi: x / Bacteria: x        RADIOLOGY & ADDITIONAL TESTS:

## 2024-01-12 NOTE — PROGRESS NOTE ADULT - SUBJECTIVE AND OBJECTIVE BOX
NEPHROLOGY     Patient seen and examined with daughter at bedside, who reports pt is doing better this morning, pt complains of knee pain, denies sob, comfortable on room air, in no acute distress.     MEDICATIONS  (STANDING):  aMIOdarone    Tablet   Oral   aMIOdarone    Tablet 400 milliGRAM(s) Oral every 8 hours  apixaban 2.5 milliGRAM(s) Oral two times a day  atorvastatin 40 milliGRAM(s) Oral at bedtime  brimonidine 0.2% Ophthalmic Solution 1 Drop(s) Both EYES daily  chlorhexidine 2% Cloths 1 Application(s) Topical daily  dextrose 5%. 1000 milliLiter(s) (100 mL/Hr) IV Continuous <Continuous>  dextrose 5%. 1000 milliLiter(s) (50 mL/Hr) IV Continuous <Continuous>  dextrose 50% Injectable 25 Gram(s) IV Push once  dextrose 50% Injectable 12.5 Gram(s) IV Push once  dextrose 50% Injectable 25 Gram(s) IV Push once  ferrous    sulfate 325 milliGRAM(s) Oral daily  furosemide    Tablet 40 milliGRAM(s) Oral daily  glucagon  Injectable 1 milliGRAM(s) IntraMuscular once  hydrocortisone 2.5% Ointment 1 Application(s) Topical two times a day  insulin glargine Injectable (LANTUS) 10 Unit(s) SubCutaneous at bedtime  insulin glargine Injectable (LANTUS) 20 Unit(s) SubCutaneous every morning  insulin lispro (ADMELOG) corrective regimen sliding scale   SubCutaneous three times a day before meals  insulin lispro (ADMELOG) corrective regimen sliding scale   SubCutaneous at bedtime  metoprolol succinate ER 25 milliGRAM(s) Oral daily  Nephro-patsy 1 Tablet(s) Oral daily    VITALS:  T(C): , Max: 36.8 (01-11-24 @ 20:47)  T(F): , Max: 98.3 (01-12-24 @ 04:37)  HR: 60 (01-12-24 @ 04:37)  BP: 143/68 (01-12-24 @ 04:37)  RR: 18 (01-12-24 @ 04:37)  SpO2: 95% (01-12-24 @ 04:37)    I and O's:    01-11 @ 07:01  -  01-12 @ 07:00  --------------------------------------------------------  IN: 200 mL / OUT: 1650 mL / NET: -1450 mL    PHYSICAL EXAM:  Constitutional: Obese, alert, mildly confused  HEENT: NCAT, DMM  Neck: Supple, No JVD  Respiratory: CTA-b/l  Cardiovascular: RRR s1s2, no m/r/g  Gastrointestinal: BS+, soft, NT/ND  Extremities: (+)nonpitting edema, L>R  Neurological: no focal deficits; strength grossly intact  Back: no CVAT b/l  : +ext cath   Skin: No rashes, no nevi    LABS:                        9.2    4.46  )-----------( 206      ( 12 Jan 2024 07:11 )             26.2     01-12    136  |  98  |  25<H>  ----------------------------<  113<H>  4.1   |  28  |  1.77<H>    Ca    9.1      12 Jan 2024 07:11    RADIOLOGY & ADDITIONAL STUDIES:  rad< from: CT Brain Stroke Protocol (01.11.24 @ 16:03) >  IMPRESSION:    Foci of low density in the bilateral basal ganglia regions, left   thalamus, left pontomedullary junction, central medulla, and right   cerebellar hemisphere which may represent ischemic changes of   indeterminate age.    No acute intracranial hemorrhage.    Dr. Barraza discussed these findings with neurology consult team on   1/11/2024 4:21 PM with read back.    --- End of Report ---    JANKI BARRAZA MD; Attending Radiologist  This document has been electronically signed. Jan 11 2024  4:21PM    < end of copied text >

## 2024-01-12 NOTE — PROVIDER CONTACT NOTE (OTHER) - BACKGROUND
Dx: CHF  Hx: Chronic A-Fib, HTN, DM2
(Admit Diagnosis) Heart failure; (Principal DC/DX) Acute CHF;  Suspected pulmonary embolism; (PMH) 2nd degree AV block; Chronic atrial fibrillation

## 2024-01-12 NOTE — CHART NOTE - NSCHARTNOTEFT_GEN_A_CORE
MEDICINE PA NOTE    Code stroke called yesterday 1/11 d/t slurred speech and L facial droop. CT Head showing no acute intracranial hemorrhage. Neuro recommending MRI brain w/wo, MRA head w/o, MRA neck w/wo; as an alternative can consider repeat CT head 24-48 hours and carotid dopplers if patient too claustrophobic.   Spoke to patient and daughter at bedside regarding MRI vs repeat CT Head. Patient states [facility] attempts were made for MRI, but unsuccessful d/t extreme claustrophobia. She was ultimately recommended for outpatient open MRI.   Patient and daughter agreeable to repeat CT Head and carotid dopplers. Orders placed. MEDICINE PA NOTE    Code stroke called yesterday 1/11 d/t slurred speech and L facial droop. CT Head showing no acute intracranial hemorrhage. Neuro recommending MRI brain w/wo, MRA head w/o, MRA neck w/wo; as an alternative can consider repeat CT head 24-48 hours and carotid dopplers if patient too claustrophobic.   Spoke to patient and daughter at bedside regarding MRI vs repeat CT Head. Patient states she had a prior admission at [facility] where multiple attempts were made for MRI, but unsuccessful d/t extreme claustrophobia. She was ultimately recommended for outpatient open MRI.   Patient and daughter agreeable to repeat CT Head and carotid dopplers. Orders placed. negative

## 2024-01-12 NOTE — PROVIDER CONTACT NOTE (OTHER) - ASSESSMENT
Pt is alert & oriented x4. Pt is on room air. Pt has no complaints of palpitations/chest pain/SOB. VSS at this time. Pt is asymptomatic.
Pt A&Ox4, ambulates with cane, rollator and/or wheelchair.

## 2024-01-12 NOTE — PROGRESS NOTE ADULT - SUBJECTIVE AND OBJECTIVE BOX
Patient is a 83y old  Female who presents with a chief complaint of Heart failure    Per chart, pt is an 83 year old Female with PMH of Bradycardia s/p PPM, AFib on Eliquis, HTN, CKD, T2DM, presenting c/o SOB on exertion, cough w/ white sputum x2 wks. Also reports L-sided swelling (arm, breast and leg).  Had DVT, US of LUE and bilateral LLE, which were negative. 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    (08 Jan 2024 11:18)      SUBJECTIVE / OVERNIGHT EVENTS: Events nored s/p Stroke code. Comfortable without new complaints. Daughter at bedside.  Review of Systems  chest pain no  palpitations no  sob no  nausea no  headache no    MEDICATIONS  (STANDING):  aMIOdarone    Tablet 400 milliGRAM(s) Oral every 8 hours  aMIOdarone    Tablet   Oral   apixaban 2.5 milliGRAM(s) Oral two times a day  atorvastatin 40 milliGRAM(s) Oral at bedtime  brimonidine 0.2% Ophthalmic Solution 1 Drop(s) Both EYES daily  chlorhexidine 2% Cloths 1 Application(s) Topical daily  dextrose 5%. 1000 milliLiter(s) (100 mL/Hr) IV Continuous <Continuous>  dextrose 5%. 1000 milliLiter(s) (50 mL/Hr) IV Continuous <Continuous>  dextrose 50% Injectable 25 Gram(s) IV Push once  dextrose 50% Injectable 12.5 Gram(s) IV Push once  dextrose 50% Injectable 25 Gram(s) IV Push once  ferrous    sulfate 325 milliGRAM(s) Oral daily  furosemide    Tablet 40 milliGRAM(s) Oral daily  glucagon  Injectable 1 milliGRAM(s) IntraMuscular once  hydrocortisone 2.5% Ointment 1 Application(s) Topical two times a day  insulin glargine Injectable (LANTUS) 10 Unit(s) SubCutaneous at bedtime  insulin glargine Injectable (LANTUS) 20 Unit(s) SubCutaneous every morning  insulin lispro (ADMELOG) corrective regimen sliding scale   SubCutaneous three times a day before meals  insulin lispro (ADMELOG) corrective regimen sliding scale   SubCutaneous at bedtime  metoprolol succinate ER 25 milliGRAM(s) Oral daily  Nephro-patsy 1 Tablet(s) Oral daily    MEDICATIONS  (PRN):  acetaminophen     Tablet .. 650 milliGRAM(s) Oral every 6 hours PRN Temp greater or equal to 38.5C (101.3F), Mild Pain (1 - 3)  albuterol    90 MICROgram(s) HFA Inhaler 2 Puff(s) Inhalation every 6 hours PRN Shortness of Breath and/or Wheezing  benzonatate 100 milliGRAM(s) Oral every 8 hours PRN Cough  dextrose Oral Gel 15 Gram(s) Oral once PRN Blood Glucose LESS THAN 70 milliGRAM(s)/deciliter  guaiFENesin Oral Liquid (Sugar-Free) 100 milliGRAM(s) Oral every 6 hours PRN Cough      Vital Signs Last 24 Hrs  T(C): 36.7 (12 Jan 2024 20:05), Max: 36.8 (12 Jan 2024 04:37)  T(F): 98.1 (12 Jan 2024 20:05), Max: 98.3 (12 Jan 2024 04:37)  HR: 101 (12 Jan 2024 20:05) (60 - 101)  BP: 105/73 (12 Jan 2024 20:05) (105/62 - 143/68)  BP(mean): --  RR: 18 (12 Jan 2024 20:05) (18 - 18)  SpO2: 95% (12 Jan 2024 20:05) (95% - 96%)    Parameters below as of 12 Jan 2024 20:05  Patient On (Oxygen Delivery Method): room air        PHYSICAL EXAM:  GENERAL: NAD, well-developed  HEAD:  Atraumatic, Normocephalic  EYES: EOMI, PERRLA, conjunctiva and sclera clear  NECK: Supple, No JVD  CHEST/LUNG: Clear to auscultation bilaterally; No wheeze L breast erythema   HEART: Regular rate and rhythm; No murmurs, rubs, or gallops  ABDOMEN: Soft, Nontender, Nondistended; Bowel sounds present  EXTREMITIES:  2+ Peripheral Pulses, No clubbing, cyanosis, or edema  PSYCH: AAOx2  NEUROLOGY: non-focal  SKIN: No rashes or lesions    LABS:                        9.2    4.46  )-----------( 206      ( 12 Jan 2024 07:11 )             26.2     01-12    136  |  98  |  25<H>  ----------------------------<  113<H>  4.1   |  28  |  1.77<H>    Ca    9.1      12 Jan 2024 07:11      PT/INR - ( 11 Jan 2024 15:53 )   PT: 14.6 sec;   INR: 1.34 ratio         PTT - ( 11 Jan 2024 15:53 )  PTT:33.6 sec      Urinalysis Basic - ( 12 Jan 2024 07:11 )    Color: x / Appearance: x / SG: x / pH: x  Gluc: 113 mg/dL / Ketone: x  / Bili: x / Urobili: x   Blood: x / Protein: x / Nitrite: x   Leuk Esterase: x / RBC: x / WBC x   Sq Epi: x / Non Sq Epi: x / Bacteria: x          RADIOLOGY & ADDITIONAL TESTS:    Imaging Personally Reviewed:  < from: CT Head No Cont (01.12.24 @ 17:09) >  FINDINGS:  The ventricles and sulci are prominent, compatible with age-related   generalized cerebral volume loss.   There is no CT evidence for acute   cerebral cortical infarct. There are chronic lacunar infarcts in the   bilateral basal ganglia, left khalida, right cerebellum and left thalamus.   There is no evidence of hemorrhage. There is periventricular and   subcortical white matter hypoattenuation,  most compatible with chronic   microvascular ischemic changes.   No mass effect is found in the brain.    There is no midline shift or herniation pattern.      The visualized portions of the paranasal sinuses and mastoid air cells   are clear.    IMPRESSION:    No evidence of acute intracranial abnormality.  No evidence of hemorrhage.    Chronic changes as above.    < end of copied text >    Consultant(s) Notes Reviewed:      Care Discussed with Consultants/Other Providers:

## 2024-01-12 NOTE — PROVIDER CONTACT NOTE (OTHER) - ACTION/TREATMENT ORDERED:
No interventions at this time. Plan of care ongoing.
ACP Miranda Peralta notified and aware. Wound care consult and dietitian consult ordered. Pt safety maintained.

## 2024-01-12 NOTE — PROGRESS NOTE ADULT - ASSESSMENT
iron deficiency anemia   congestive heart failure   CKD    cbc daily   no signs of GI bleeding   occult negative   diuresis per cardiology   no plans for EGD/colonoscopy at this time  d/w pt and daughter at bedside

## 2024-01-13 VITALS
RESPIRATION RATE: 18 BRPM | TEMPERATURE: 98 F | HEART RATE: 104 BPM | DIASTOLIC BLOOD PRESSURE: 67 MMHG | SYSTOLIC BLOOD PRESSURE: 139 MMHG | OXYGEN SATURATION: 96 %

## 2024-01-13 LAB
GLUCOSE BLDC GLUCOMTR-MCNC: 125 MG/DL — HIGH (ref 70–99)
GLUCOSE BLDC GLUCOMTR-MCNC: 125 MG/DL — HIGH (ref 70–99)
GLUCOSE BLDC GLUCOMTR-MCNC: 162 MG/DL — HIGH (ref 70–99)
GLUCOSE BLDC GLUCOMTR-MCNC: 162 MG/DL — HIGH (ref 70–99)

## 2024-01-13 PROCEDURE — 97116 GAIT TRAINING THERAPY: CPT

## 2024-01-13 PROCEDURE — 85018 HEMOGLOBIN: CPT

## 2024-01-13 PROCEDURE — 70450 CT HEAD/BRAIN W/O DYE: CPT

## 2024-01-13 PROCEDURE — 82746 ASSAY OF FOLIC ACID SERUM: CPT

## 2024-01-13 PROCEDURE — 85014 HEMATOCRIT: CPT

## 2024-01-13 PROCEDURE — 82962 GLUCOSE BLOOD TEST: CPT

## 2024-01-13 PROCEDURE — 84295 ASSAY OF SERUM SODIUM: CPT

## 2024-01-13 PROCEDURE — 82803 BLOOD GASES ANY COMBINATION: CPT

## 2024-01-13 PROCEDURE — 96374 THER/PROPH/DIAG INJ IV PUSH: CPT

## 2024-01-13 PROCEDURE — 82947 ASSAY GLUCOSE BLOOD QUANT: CPT

## 2024-01-13 PROCEDURE — 84484 ASSAY OF TROPONIN QUANT: CPT

## 2024-01-13 PROCEDURE — 82553 CREATINE MB FRACTION: CPT

## 2024-01-13 PROCEDURE — 87640 STAPH A DNA AMP PROBE: CPT

## 2024-01-13 PROCEDURE — 81003 URINALYSIS AUTO W/O SCOPE: CPT

## 2024-01-13 PROCEDURE — 85025 COMPLETE CBC W/AUTO DIFF WBC: CPT

## 2024-01-13 PROCEDURE — 76642 ULTRASOUND BREAST LIMITED: CPT

## 2024-01-13 PROCEDURE — 82607 VITAMIN B-12: CPT

## 2024-01-13 PROCEDURE — 71275 CT ANGIOGRAPHY CHEST: CPT | Mod: MA

## 2024-01-13 PROCEDURE — 85730 THROMBOPLASTIN TIME PARTIAL: CPT

## 2024-01-13 PROCEDURE — 85027 COMPLETE CBC AUTOMATED: CPT

## 2024-01-13 PROCEDURE — 36415 COLL VENOUS BLD VENIPUNCTURE: CPT

## 2024-01-13 PROCEDURE — 80048 BASIC METABOLIC PNL TOTAL CA: CPT

## 2024-01-13 PROCEDURE — 84443 ASSAY THYROID STIM HORMONE: CPT

## 2024-01-13 PROCEDURE — 82550 ASSAY OF CK (CPK): CPT

## 2024-01-13 PROCEDURE — 85610 PROTHROMBIN TIME: CPT

## 2024-01-13 PROCEDURE — 80053 COMPREHEN METABOLIC PANEL: CPT

## 2024-01-13 PROCEDURE — 99285 EMERGENCY DEPT VISIT HI MDM: CPT | Mod: 25

## 2024-01-13 PROCEDURE — 83880 ASSAY OF NATRIURETIC PEPTIDE: CPT

## 2024-01-13 PROCEDURE — 84100 ASSAY OF PHOSPHORUS: CPT

## 2024-01-13 PROCEDURE — 83550 IRON BINDING TEST: CPT

## 2024-01-13 PROCEDURE — 82728 ASSAY OF FERRITIN: CPT

## 2024-01-13 PROCEDURE — 97162 PT EVAL MOD COMPLEX 30 MIN: CPT

## 2024-01-13 PROCEDURE — 97110 THERAPEUTIC EXERCISES: CPT

## 2024-01-13 PROCEDURE — 87040 BLOOD CULTURE FOR BACTERIA: CPT

## 2024-01-13 PROCEDURE — 82435 ASSAY OF BLOOD CHLORIDE: CPT

## 2024-01-13 PROCEDURE — 94640 AIRWAY INHALATION TREATMENT: CPT

## 2024-01-13 PROCEDURE — 82272 OCCULT BLD FECES 1-3 TESTS: CPT

## 2024-01-13 PROCEDURE — 83735 ASSAY OF MAGNESIUM: CPT

## 2024-01-13 PROCEDURE — 83540 ASSAY OF IRON: CPT

## 2024-01-13 PROCEDURE — 93971 EXTREMITY STUDY: CPT

## 2024-01-13 PROCEDURE — 83605 ASSAY OF LACTIC ACID: CPT

## 2024-01-13 PROCEDURE — 83036 HEMOGLOBIN GLYCOSYLATED A1C: CPT

## 2024-01-13 PROCEDURE — 93306 TTE W/DOPPLER COMPLETE: CPT

## 2024-01-13 PROCEDURE — 87637 SARSCOV2&INF A&B&RSV AMP PRB: CPT

## 2024-01-13 PROCEDURE — 87641 MR-STAPH DNA AMP PROBE: CPT

## 2024-01-13 PROCEDURE — 82330 ASSAY OF CALCIUM: CPT

## 2024-01-13 PROCEDURE — 93880 EXTRACRANIAL BILAT STUDY: CPT

## 2024-01-13 PROCEDURE — 84132 ASSAY OF SERUM POTASSIUM: CPT

## 2024-01-13 RX ORDER — ACETAMINOPHEN 500 MG
1000 TABLET ORAL ONCE
Refills: 0 | Status: COMPLETED | OUTPATIENT
Start: 2024-01-13 | End: 2024-01-13

## 2024-01-13 RX ADMIN — Medication 25 MILLIGRAM(S): at 05:27

## 2024-01-13 RX ADMIN — AMIODARONE HYDROCHLORIDE 400 MILLIGRAM(S): 400 TABLET ORAL at 13:18

## 2024-01-13 RX ADMIN — APIXABAN 2.5 MILLIGRAM(S): 2.5 TABLET, FILM COATED ORAL at 05:27

## 2024-01-13 RX ADMIN — INSULIN GLARGINE 20 UNIT(S): 100 INJECTION, SOLUTION SUBCUTANEOUS at 08:02

## 2024-01-13 RX ADMIN — AMIODARONE HYDROCHLORIDE 400 MILLIGRAM(S): 400 TABLET ORAL at 05:27

## 2024-01-13 RX ADMIN — Medication 400 MILLIGRAM(S): at 10:06

## 2024-01-13 RX ADMIN — Medication 1: at 12:23

## 2024-01-13 RX ADMIN — Medication 1000 MILLIGRAM(S): at 01:10

## 2024-01-13 RX ADMIN — CHLORHEXIDINE GLUCONATE 1 APPLICATION(S): 213 SOLUTION TOPICAL at 12:22

## 2024-01-13 RX ADMIN — Medication 40 MILLIGRAM(S): at 05:27

## 2024-01-13 RX ADMIN — Medication 1000 MILLIGRAM(S): at 11:06

## 2024-01-13 RX ADMIN — Medication 1 TABLET(S): at 12:22

## 2024-01-13 RX ADMIN — Medication 400 MILLIGRAM(S): at 00:13

## 2024-01-13 RX ADMIN — Medication 1 APPLICATION(S): at 05:27

## 2024-01-13 RX ADMIN — Medication 400 MILLIGRAM(S): at 05:27

## 2024-01-13 RX ADMIN — BRIMONIDINE TARTRATE 1 DROP(S): 2 SOLUTION/ DROPS OPHTHALMIC at 12:22

## 2024-01-13 RX ADMIN — Medication 325 MILLIGRAM(S): at 12:22

## 2024-01-13 RX ADMIN — Medication 1000 MILLIGRAM(S): at 06:09

## 2024-01-13 NOTE — PROGRESS NOTE ADULT - ASSESSMENT
83-year-old RH woman with  chronic atrial fibrillation (on apixaban), HLD, T2DM, HTN, class III obesity, 2nd degree AV block, pacemaker, glaucoma of the left eye, for whom a CODE STROKE was called on 1/11/2024 - for slurred speech and L facial droop. Currently admitted for CHF. Also has Afib - on apixaban 2.5mg BID. Was apparently started on amiodarone during this admission - patient says medication makes her dizzy and makes her tongue feel heavy. Was speaking with her granddaughter this PM - who noticed sudden onset slurred speech. Primary team noted questionable L facial droop. RRT was called, then subsequently a CODE STROKE. Unable to obtain CTA/CTP d/t IV blew during CTA - patient difficult to obtain IV access (requires ultrasound). In addition, she has very poor renal function. Not able to receive tenecteplase d/t apixaban use with elevated INR. Patient's speech apparently back to baseline at end of CODE STROKE.    NO tenecteplase d/t took apixaban this AM and INR is 1.34.  NO thrombectomy d/t no LVO identified - unable to obtain CTA d/t no IV access and poor renal function (eGFR 26), pre-MRS is at least 3.     Recent vitals notable for: afebrile, HR 70, /77, RR 18, SpO2 98% on RA. Labs notable for: Hg 9.7, RDW 15.2, INR 1.34, BUN 24, SCr 1.88, glucose 186, TSH 2.39, UA: +glucose. CT imaging disclosed: multiple foci of low density that may represent ischemic changes of indeterminate age. Exam notable for questionable L NLF flattening, dysarthria (now at baseline), disconjugate gaze with inability to bury sclera when adducting R eye (?R MARY, ?chronic), edentulous.   CTH neg  CD neg     NIHSS on admission: 3  LKN: 15:00, 1/11/2024  pre-MRS: at least 3    Impression: Questionable L facial weakness and dysarthria, d/t R brain dysfunction - possible acute ischemic infarct, mechanism pending workup (but may be cardioembolic d/t Afib), doubt new stroke on DOAC.  also wears dentures.  possible toxic metabolic, now at baseline per daughter     Recommendations:  [] Telemetry monitoring  [] Dysphagia screen  [] SBP goal per primary (hospitalized for acute on chronic HF) - please avoid hypotension  [] Continue apixaban 2.5mg BID, correct dose given age and Cr   [] Continue home atorvastatin 40mg (titrate LDL<70) daily  [] A1c, lipid panel  [] MRI brain w/wo, MRA head w/o, MRA neck w/wo - Patient extremely claustrophobic/tearful at thought of MRI - saying "no more tests." Can give anxiolytic or can consider repeat CT head in 24-48 hours and carotid dopplers as an alternative. unlikely to change mangagement   [] TTE performed 1/6/2024 - do NOT repeat (LVEF 60-65%, moderately dilated LA, no PFO)  [] Neurochecks, vitals q4h  [] Fall and aspiration precautions  [] PT/OT/SLP/CM  [] Diet: target is DASH/TLC  [] DVT prophylaxis: apixaban 2.5mg BID AND SCDs  [] Stroke education provided  [] Smoking cessation education not needed - nonsmoker  [] Please document MRS on discharge  dc planning   spoke with daughter at bedside on 1/12   Michael Putnam MD  Vascular Neurology  Office: [phone].       83-year-old RH woman with  chronic atrial fibrillation (on apixaban), HLD, T2DM, HTN, class III obesity, 2nd degree AV block, pacemaker, glaucoma of the left eye, for whom a CODE STROKE was called on 1/11/2024 - for slurred speech and L facial droop. Currently admitted for CHF. Also has Afib - on apixaban 2.5mg BID. Was apparently started on amiodarone during this admission - patient says medication makes her dizzy and makes her tongue feel heavy. Was speaking with her granddaughter this PM - who noticed sudden onset slurred speech. Primary team noted questionable L facial droop. RRT was called, then subsequently a CODE STROKE. Unable to obtain CTA/CTP d/t IV blew during CTA - patient difficult to obtain IV access (requires ultrasound). In addition, she has very poor renal function. Not able to receive tenecteplase d/t apixaban use with elevated INR. Patient's speech apparently back to baseline at end of CODE STROKE.    NO tenecteplase d/t took apixaban this AM and INR is 1.34.  NO thrombectomy d/t no LVO identified - unable to obtain CTA d/t no IV access and poor renal function (eGFR 26), pre-MRS is at least 3.     Recent vitals notable for: afebrile, HR 70, /77, RR 18, SpO2 98% on RA. Labs notable for: Hg 9.7, RDW 15.2, INR 1.34, BUN 24, SCr 1.88, glucose 186, TSH 2.39, UA: +glucose. CT imaging disclosed: multiple foci of low density that may represent ischemic changes of indeterminate age. Exam notable for questionable L NLF flattening, dysarthria (now at baseline), disconjugate gaze with inability to bury sclera when adducting R eye (?R MARY, ?chronic), edentulous.   CTH neg  CD neg     NIHSS on admission: 3  LKN: 15:00, 1/11/2024  pre-MRS: at least 3    Impression: Questionable L facial weakness and dysarthria, d/t R brain dysfunction - possible acute ischemic infarct, mechanism pending workup (but may be cardioembolic d/t Afib), doubt new stroke on DOAC.  also wears dentures.  possible toxic metabolic, now at baseline per daughter     Recommendations:  [] Telemetry monitoring  [] Dysphagia screen  [] SBP goal per primary (hospitalized for acute on chronic HF) - please avoid hypotension  [] Continue apixaban 2.5mg BID, correct dose given age and Cr   [] Continue home atorvastatin 40mg (titrate LDL<70) daily  [] A1c, lipid panel  [] MRI brain w/wo, MRA head w/o, MRA neck w/wo - Patient extremely claustrophobic/tearful at thought of MRI - saying "no more tests." Can give anxiolytic or can consider repeat CT head in 24-48 hours and carotid dopplers as an alternative. unlikely to change mangagement   [] TTE performed 1/6/2024 - do NOT repeat (LVEF 60-65%, moderately dilated LA, no PFO)  [] Neurochecks, vitals q4h  [] Fall and aspiration precautions  [] PT/OT/SLP/CM  [] Diet: target is DASH/TLC  [] DVT prophylaxis: apixaban 2.5mg BID AND SCDs  [] Stroke education provided  [] Smoking cessation education not needed - nonsmoker  [] Please document MRS on discharge  dc planning   spoke with daughter at bedside on 1/12   Michael Putnam MD  Vascular Neurology  Office: [phone].

## 2024-01-13 NOTE — PROGRESS NOTE ADULT - ASSESSMENT
83 f with     CVA vs TIA  - Neurology follow  - CT head noted  - Carotid Dopplers pending   - MRI brain as OTP ( refused)    Acute on Chronic Systolic Congestive Heart Failure  - telemetry  - diurese   - echo noted  - cardiology follow    A Fib  - rate control  - Amiodarone  - AC with Eliquis 2.5 bid    L Breast abnormality  - US L breast noted   - Surgical evaluation noted  - Dermatology evaluation for possible bx noted  - Oncology evaluation noted  - further work up as OTP with PMD and possible Bx    Anemia iron deficiency  - follow Hct  - iron studies noted  - Iron supplementation   - Gastroenterology evaluation noted. No EGD/ Colon.    Diabetes Mellitus  - BS control  - ADA diet     CKD  - follow cr, lytes  - Nephrology follow Dr. Suarez    PPM  - check    HTN control    Glaucoma  - continue gtt     Obesity morbid  - nutrition education    PT    DVT prophylaxis  - PAS    d/w patient , daughter and ACP    DC home . Follow with PMD/ Cardiology/ Neurology/ [facility] in 3-4 days. QA .      Jamal Ramachandran MD phone [phone]  83 f with     CVA vs TIA  - Neurology follow  - CT head noted  - Carotid Dopplers pending   - MRI brain as OTP ( refused)    Acute on Chronic Systolic Congestive Heart Failure  - telemetry  - diurese   - echo noted  - cardiology follow    A Fib  - rate control  - Amiodarone  - AC with Eliquis 2.5 bid    L Breast abnormality  - US L breast noted   - Surgical evaluation noted  - Dermatology evaluation for possible bx noted  - Oncology evaluation noted  - further work up as OTP with PMD and possible Bx    Anemia iron deficiency  - follow Hct  - iron studies noted  - Iron supplementation   - Gastroenterology evaluation noted. No EGD/ Colon.    Diabetes Mellitus  - BS control  - ADA diet     CKD  - follow cr, lytes  - Nephrology follow Dr. Suarez    PPM  - check    HTN control    Glaucoma  - continue gtt     Obesity morbid  - nutrition education    PT    DVT prophylaxis  - PAS    d/w patient , daughter and ACP    DC home . Follow with PMD/ Cardiology/ Neurology/ [facility] in 3-4 days. QA .      Jamal Ramachandran MD phone [phone]

## 2024-01-13 NOTE — PROGRESS NOTE ADULT - SUBJECTIVE AND OBJECTIVE BOX
INTERVAL HPI/OVERNIGHT EVENTS:  No new overnight event.  No N/V/D.  Tolerating diet.  no bleeding    Allergies    lisinopril (Hives)  penicillin (Unknown)    Intolerances    General:  No wt loss, fevers, chills, night sweats, fatigue,   Eyes:  Good vision, no reported pain  ENT:  No sore throat, pain, runny nose, dysphagia  CV:  No pain, palpitations, hypo/hypertension  Resp:  No dyspnea, cough, tachypnea, wheezing  GI:  No pain, No nausea, No vomiting, No diarrhea, No constipation, No weight loss, No fever, No pruritis, No rectal bleeding, No tarry stools, No dysphagia,  :  No pain, bleeding, incontinence, nocturia  Muscle:  No pain, weakness  Neuro:  No weakness, tingling, memory problems  Psych:  No fatigue, insomnia, mood problems, depression  Endocrine:  No polyuria, polydipsia, cold/heat intolerance  Heme:  No petechiae, ecchymosis, easy bruisability  Skin:  No rash, tattoos, scars, edema      PHYSICAL EXAM:   Vital Signs:  Vital Signs Last 24 Hrs  T(C): 36.4 (2024 11:24), Max: 36.8 (2024 04:36)  T(F): 97.6 (2024 11:24), Max: 98.3 (2024 04:36)  HR: 104 (2024 11:24) (75 - 104)  BP: 139/67 (2024 11:24) (105/62 - 156/70)  BP(mean): --  RR: 18 (2024 11:24) (18 - 18)  SpO2: 96% (2024 11:24) (95% - 97%)    Parameters below as of 2024 11:24  Patient On (Oxygen Delivery Method): room air      Daily     Daily Weight in k (2024 08:34)I&O's Summary    2024 07:01  -  2024 07:00  --------------------------------------------------------  IN: 400 mL / OUT: 1500 mL / NET: -1100 mL        GENERAL:  Appears stated age, well-groomed, well-nourished, no distress  HEENT:  NC/AT,  conjunctivae clear and pink, no thyromegaly, nodules, adenopathy, no JVD, sclera -anicteric  CHEST:  Full & symmetric excursion, no increased effort, breath sounds clear  HEART:  Regular rhythm, S1, S2, no murmur/rub/S3/S4, no abdominal bruit, no edema  ABDOMEN:  Soft, non-tender, non-distended, normoactive bowel sounds,  no masses ,no hepato-splenomegaly, no signs of chronic liver disease  EXTEREMITIES:  no cyanosis,clubbing or edema  SKIN:  No rash/erythema/ecchymoses/petechiae/wounds/abscess/warm/dry  NEURO:  Alert, oriented, no asterixis, no tremor, no encephalopathy      LABS:                        9.2    4.46  )-----------( 206      ( 2024 07:11 )             26.2     -    136  |  98  |  25<H>  ----------------------------<  113<H>  4.1   |  28  |  1.77<H>    Ca    9.1      2024 07:11      PT/INR - ( 2024 15:53 )   PT: 14.6 sec;   INR: 1.34 ratio         PTT - ( 2024 15:53 )  PTT:33.6 sec  Urinalysis Basic - ( 2024 07:11 )    Color: x / Appearance: x / SG: x / pH: x  Gluc: 113 mg/dL / Ketone: x  / Bili: x / Urobili: x   Blood: x / Protein: x / Nitrite: x   Leuk Esterase: x / RBC: x / WBC x   Sq Epi: x / Non Sq Epi: x / Bacteria: x      amylase   lipase  RADIOLOGY & ADDITIONAL TESTS:

## 2024-01-13 NOTE — PROGRESS NOTE ADULT - NUTRITIONAL ASSESSMENT
This patient has been assessed with a concern for Malnutrition and has been determined to have a diagnosis/diagnoses of Morbid obesity (BMI > 40).    This patient is being managed with:   Diet Regular-  Consistent Carbohydrate {Evening Snack} (CSTCHOSN)  Low Sodium  Entered: Jan 8 2024  4:29PM  
This patient has been assessed with a concern for Malnutrition and has been determined to have a diagnosis/diagnoses of Morbid obesity (BMI > 40).    This patient is being managed with:   Diet Regular-  Consistent Carbohydrate {Evening Snack} (CSTCHOSN)  Low Sodium  Entered: Jan 11 2024  3:49PM  
This patient has been assessed with a concern for Malnutrition and has been determined to have a diagnosis/diagnoses of Morbid obesity (BMI > 40).    This patient is being managed with:   Diet Regular-  Consistent Carbohydrate {Evening Snack} (CSTCHOSN)  Low Sodium  Entered: Jan 11 2024  3:49PM  
This patient has been assessed with a concern for Malnutrition and has been determined to have a diagnosis/diagnoses of Morbid obesity (BMI > 40).    This patient is being managed with:   Diet Regular-  Consistent Carbohydrate {Evening Snack} (CSTCHOSN)  Low Sodium  Entered: Jan 8 2024  4:29PM  
This patient has been assessed with a concern for Malnutrition and has been determined to have a diagnosis/diagnoses of Morbid obesity (BMI > 40).    This patient is being managed with:   Diet Regular-  Consistent Carbohydrate {Evening Snack} (CSTCHOSN)  Low Sodium  Entered: Jan 8 2024  4:29PM  
This patient has been assessed with a concern for Malnutrition and has been determined to have a diagnosis/diagnoses of Morbid obesity (BMI > 40).    This patient is being managed with:   Diet Regular-  Consistent Carbohydrate {Evening Snack} (CSTCHOSN)  Low Sodium  Entered: Jan 11 2024  3:49PM

## 2024-01-13 NOTE — CHART NOTE - NSCHARTNOTEFT_GEN_A_CORE
Patient medically cleared per Dr. Ramachandran as CTH and duplex results are posted.   D/C meds previously completed by prior provider, family states patient has her medications already. D/C med rec confirmed with Dr. Ramachandran today.   hemodynamically stable for dc today.

## 2024-01-13 NOTE — PROGRESS NOTE ADULT - SUBJECTIVE AND OBJECTIVE BOX
Patient is a 83y old  Female who presents with a chief complaint of Heart failure    Per chart, pt is an 83 year old Female with PMH of Bradycardia s/p PPM, AFib on Eliquis, HTN, CKD, T2DM, presenting c/o SOB on exertion, cough w/ white sputum x2 wks. Also reports L-sided swelling (arm, breast and leg).  Had DVT, US of LUE and bilateral LLE, which were negative. 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    (08 Jan 2024 11:18)      SUBJECTIVE / OVERNIGHT EVENTS: Comfortable without new complaints.   Review of Systems  chest pain no  palpitations no  sob no  nausea no  headache no    MEDICATIONS  (STANDING):  aMIOdarone    Tablet   Oral   aMIOdarone    Tablet 400 milliGRAM(s) Oral every 8 hours  apixaban 2.5 milliGRAM(s) Oral two times a day  atorvastatin 40 milliGRAM(s) Oral at bedtime  brimonidine 0.2% Ophthalmic Solution 1 Drop(s) Both EYES daily  chlorhexidine 2% Cloths 1 Application(s) Topical daily  dextrose 5%. 1000 milliLiter(s) (50 mL/Hr) IV Continuous <Continuous>  dextrose 5%. 1000 milliLiter(s) (100 mL/Hr) IV Continuous <Continuous>  dextrose 50% Injectable 12.5 Gram(s) IV Push once  dextrose 50% Injectable 25 Gram(s) IV Push once  dextrose 50% Injectable 25 Gram(s) IV Push once  ferrous    sulfate 325 milliGRAM(s) Oral daily  furosemide    Tablet 40 milliGRAM(s) Oral daily  glucagon  Injectable 1 milliGRAM(s) IntraMuscular once  hydrocortisone 2.5% Ointment 1 Application(s) Topical two times a day  insulin glargine Injectable (LANTUS) 10 Unit(s) SubCutaneous at bedtime  insulin glargine Injectable (LANTUS) 20 Unit(s) SubCutaneous every morning  insulin lispro (ADMELOG) corrective regimen sliding scale   SubCutaneous three times a day before meals  insulin lispro (ADMELOG) corrective regimen sliding scale   SubCutaneous at bedtime  metoprolol succinate ER 25 milliGRAM(s) Oral daily  Nephro-patsy 1 Tablet(s) Oral daily    MEDICATIONS  (PRN):  acetaminophen     Tablet .. 650 milliGRAM(s) Oral every 6 hours PRN Temp greater or equal to 38.5C (101.3F), Mild Pain (1 - 3)  albuterol    90 MICROgram(s) HFA Inhaler 2 Puff(s) Inhalation every 6 hours PRN Shortness of Breath and/or Wheezing  benzonatate 100 milliGRAM(s) Oral every 8 hours PRN Cough  dextrose Oral Gel 15 Gram(s) Oral once PRN Blood Glucose LESS THAN 70 milliGRAM(s)/deciliter  guaiFENesin Oral Liquid (Sugar-Free) 100 milliGRAM(s) Oral every 6 hours PRN Cough      Vital Signs Last 24 Hrs  T(C): 36.4 (13 Jan 2024 11:24), Max: 36.8 (13 Jan 2024 04:36)  T(F): 97.6 (13 Jan 2024 11:24), Max: 98.3 (13 Jan 2024 04:36)  HR: 104 (13 Jan 2024 11:24) (75 - 104)  BP: 139/67 (13 Jan 2024 11:24) (105/62 - 156/70)  BP(mean): --  RR: 18 (13 Jan 2024 11:24) (18 - 18)  SpO2: 96% (13 Jan 2024 11:24) (95% - 97%)    Parameters below as of 13 Jan 2024 11:24  Patient On (Oxygen Delivery Method): room air        PHYSICAL EXAM:  GENERAL: NAD   HEAD:  Atraumatic, Normocephalic   EYES: EOMI, PERRLA, conjunctiva and sclera clear  NECK: Supple, No JVD  CHEST/LUNG: Clear to auscultation bilaterally; No wheeze L breast less tender  HEART: Regular rate and rhythm; No murmurs, rubs, or gallops  ABDOMEN: Soft, Nontender, Nondistended; Bowel sounds present  EXTREMITIES:  2+ Peripheral Pulses, No clubbing, cyanosis, or edema  PSYCH: AAOx3  NEUROLOGY: non-focal  SKIN: No rashes or lesions    LABS:                        9.2    4.46  )-----------( 206      ( 12 Jan 2024 07:11 )             26.2     01-12    136  |  98  |  25<H>  ----------------------------<  113<H>  4.1   |  28  |  1.77<H>    Ca    9.1      12 Jan 2024 07:11      PT/INR - ( 11 Jan 2024 15:53 )   PT: 14.6 sec;   INR: 1.34 ratio         PTT - ( 11 Jan 2024 15:53 )  PTT:33.6 sec      Urinalysis Basic - ( 12 Jan 2024 07:11 )    Color: x / Appearance: x / SG: x / pH: x  Gluc: 113 mg/dL / Ketone: x  / Bili: x / Urobili: x   Blood: x / Protein: x / Nitrite: x   Leuk Esterase: x / RBC: x / WBC x   Sq Epi: x / Non Sq Epi: x / Bacteria: x          RADIOLOGY & ADDITIONAL TESTS:    Imaging Personally Reviewed:  < from: [facility] Duplex Carotid, Bilat (01.12.24 @ 17:42) >  IMPRESSION:    No hemodynamically significant right internal carotid artery stenosis.    Velocity doubling of the proximal left ICA compared to distal CCA,   associated with mixed plaque at the left bulb, suggests approximately 50%   stenosis.    Waveforms demonstrate arrhythmia, which may affect velocity measurements.    < end of copied text >  < from: CT Head No Cont (01.12.24 @ 17:09) >  IMPRESSION:    No evidence of acute intracranial abnormality.  No evidence of hemorrhage.    Chronic changes as above.    < end of copied text >    Consultant(s) Notes Reviewed:      Care Discussed with Consultants/Other Providers:   n/a

## 2024-01-13 NOTE — PROGRESS NOTE ADULT - SUBJECTIVE AND OBJECTIVE BOX
Attempted to see pt for PT session. 1317.  Pt eating at this time, will follow up shortly for PT eval. Neurology Progress Note    S: Patient seen and examined. No new events overnight. patient denied CP, SOB, HA or pain.     Medication:  acetaminophen     Tablet .. 650 milliGRAM(s) Oral every 6 hours PRN  albuterol    90 MICROgram(s) HFA Inhaler 2 Puff(s) Inhalation every 6 hours PRN  aMIOdarone    Tablet   Oral   aMIOdarone    Tablet 400 milliGRAM(s) Oral every 8 hours  apixaban 2.5 milliGRAM(s) Oral two times a day  atorvastatin 40 milliGRAM(s) Oral at bedtime  benzonatate 100 milliGRAM(s) Oral every 8 hours PRN  brimonidine 0.2% Ophthalmic Solution 1 Drop(s) Both EYES daily  chlorhexidine 2% Cloths 1 Application(s) Topical daily  dextrose 5%. 1000 milliLiter(s) IV Continuous <Continuous>  dextrose 5%. 1000 milliLiter(s) IV Continuous <Continuous>  dextrose 50% Injectable 12.5 Gram(s) IV Push once  dextrose 50% Injectable 25 Gram(s) IV Push once  dextrose 50% Injectable 25 Gram(s) IV Push once  dextrose Oral Gel 15 Gram(s) Oral once PRN  ferrous    sulfate 325 milliGRAM(s) Oral daily  furosemide    Tablet 40 milliGRAM(s) Oral daily  glucagon  Injectable 1 milliGRAM(s) IntraMuscular once  guaiFENesin Oral Liquid (Sugar-Free) 100 milliGRAM(s) Oral every 6 hours PRN  hydrocortisone 2.5% Ointment 1 Application(s) Topical two times a day  insulin glargine Injectable (LANTUS) 20 Unit(s) SubCutaneous every morning  insulin glargine Injectable (LANTUS) 10 Unit(s) SubCutaneous at bedtime  insulin lispro (ADMELOG) corrective regimen sliding scale   SubCutaneous three times a day before meals  insulin lispro (ADMELOG) corrective regimen sliding scale   SubCutaneous at bedtime  metoprolol succinate ER 25 milliGRAM(s) Oral daily  Nephro-patsy 1 Tablet(s) Oral daily      Vitals:  Vital Signs Last 24 Hrs  T(C): 36.4 (13 Jan 2024 11:24), Max: 36.8 (13 Jan 2024 04:36)  T(F): 97.6 (13 Jan 2024 11:24), Max: 98.3 (13 Jan 2024 04:36)  HR: 104 (13 Jan 2024 11:24) (75 - 104)  BP: 139/67 (13 Jan 2024 11:24) (105/62 - 156/70)  BP(mean): --  RR: 18 (13 Jan 2024 11:24) (18 - 18)  SpO2: 96% (13 Jan 2024 11:24) (95% - 97%)    Parameters below as of 13 Jan 2024 11:24  Patient On (Oxygen Delivery Method): room air        General Exam:   General Appearance: Appropriately dressed and in no acute distress       Head: Normocephalic, atraumatic and no dysmorphic features  Ear, Nose, and Throat: Moist mucous membranes  CVS: S1S2+  Resp: No SOB, no wheeze or rhonchi  Abd: soft NTND  Extremities: No edema, no cyanosis  Skin: No bruises, no rashes     Neurological Exam:    Mental status:  - Awake, Alert  - Oriented to: person and time. Knows she is at a hospital [place] but is not sure which one.  - Speech: fluent  - Repetition: intact   - Follows simple commands     Cranial nerves - pupils are minimally reactive, patient is blind in L eye and only sees shadows in R eye - no reliable BTT, disconjugate gaze - cannot adduct R eye fully, face sensation (V1-V3) intact b/l, questionable L NLF flattening, hearing grossly intact, palate with symmetric elevation, shoulder shrug intact b/l, tongue midline on protrusion with full lateral movement    Motor - Normal bulk and tone throughout. Did not cooperate with pronator drift test.  UEs - no drift x 10 seconds b/l  LEs - no drift x 5 seconds b/l    Sensation - Light touch intact throughout    DTRs (R/L)  Deferred d/t focused neurologic exam    Coordination - Struggles to perform FTN d/t blindness.    Gait and station - Did not assess d/t fall risk/safety concerns.      I personally reviewed the below data/images/labs:      CBC Full  -  ( 12 Jan 2024 07:11 )  WBC Count : 4.46 K/uL  RBC Count : 3.31 M/uL  Hemoglobin : 9.2 g/dL  Hematocrit : 26.2 %  Platelet Count - Automated : 206 K/uL  Mean Cell Volume : 79.2 fl  Mean Cell Hemoglobin : 27.8 pg  Mean Cell Hemoglobin Concentration : 35.1 gm/dL  Auto Neutrophil # : x  Auto Lymphocyte # : x  Auto Monocyte # : x  Auto Eosinophil # : x  Auto Basophil # : x  Auto Neutrophil % : x  Auto Lymphocyte % : x  Auto Monocyte % : x  Auto Eosinophil % : x  Auto Basophil % : x    01-12    136  |  98  |  25<H>  ----------------------------<  113<H>  4.1   |  28  |  1.77<H>    Ca    9.1      12 Jan 2024 07:11        PT/INR - ( 11 Jan 2024 15:53 )   PT: 14.6 sec;   INR: 1.34 ratio         PTT - ( 11 Jan 2024 15:53 )  PTT:33.6 sec  Urinalysis Basic - ( 12 Jan 2024 07:11 )    Color: x / Appearance: x / SG: x / pH: x  Gluc: 113 mg/dL / Ketone: x  / Bili: x / Urobili: x   Blood: x / Protein: x / Nitrite: x   Leuk Esterase: x / RBC: x / WBC x   Sq Epi: x / Non Sq Epi: x / Bacteria: x        CT BRAIN STROKE PROTOCOL    PROCEDURE DATE:  01/11/2024      INTERPRETATION:  Noncontrast CT of the brain.    CLINICAL INDICATION:  Code stroke, left facial droop and slurred speech.    TECHNIQUE : Axial CT scanning of the brain was obtained from the skull   base to the vertex without the administration of intravenous contrast.   Sagittal and coronal reformats were provided.    COMPARISON: None available    FINDINGS:    No hydrocephalus, midline shift, or effacement of the basal cisterns.    No acute intracranial hemorrhage.    Foci of low density in the bilateral basal ganglia regions, left   thalamus, left pontomedullary junction, central medulla, and right   cerebellar hemisphere which may represent ischemic changes of   indeterminate age.    Mild white matter microvascular ischemic disease.    The visualized paranasal sinuses and mastoid air cells are clear.    IMPRESSION:    Foci of low density in the bilateral basal ganglia regions, left   thalamus, left pontomedullary junction, central medulla, and right   cerebellar hemisphere which may represent ischemic changes of   indeterminate age.    No acute intracranial hemorrhage.

## 2024-01-13 NOTE — PROGRESS NOTE ADULT - PROVIDER SPECIALTY LIST ADULT
Electrophysiology
Gastroenterology
Gastroenterology
Internal Medicine
Nephrology
Electrophysiology
Gastroenterology
Electrophysiology
Gastroenterology
Internal Medicine
Internal Medicine
Gastroenterology
Internal Medicine
Nephrology
Neurology
Internal Medicine
Internal Medicine

## 2024-01-14 RX ORDER — AMIODARONE HYDROCHLORIDE 400 MG/1
1 TABLET ORAL
Qty: 30 | Refills: 0
Start: 2024-01-14 | End: 2024-02-12

## 2024-08-28 NOTE — CHART NOTE - NSCHARTNOTEFT_GEN_A_CORE
Patient and family member at bedside do not have pacemaker card with them. Called office of Dr. Lora. PPM model [facility]    Viry Nieves PA-C  Department of Medicine Physical Therapy      Patient Name:  Maria A Smith   MRN:  [id number]    Patient not seen today secondary to PT attempted to see pt x 3 times: pt was getting an echo, then was going to vascular, and pt refused third time due to being tired. Sara Hong PT 8/28/24     Patient and family member at bedside do not have pacemaker card with them. Called office of Dr. Lora. PPM model [facility].    Viry Nieves PA-C  Department of Medicine Patient and family member at bedside do not have pacemaker card with them. Called office of Dr. Lora. PPM model information to be faxed to [address] [place] [address].    ADDENDUM:   No fax received. Provider called [employer] for ppm info.  Type - Accolade MRI  Device Model Number - L331  Device Serial Number - [id number]    Device information given to EP for interrogation.    Viry Nieves PA-C  Department of Medicine Patient and family member at bedside do not have pacemaker card with them. Called office of Dr. Lora. PPM model information to be faxed to [address] [place] [address].    ADDENDUM:   No fax received. Provider called [employer] for ppm info.  Type - Accolade MRI  Device Model Number - L331  Device Serial Number - [id number]    Device information given to EP for interrogation.    Viry Nieves PA-C  Department of Medicine

## 2024-12-21 NOTE — PATIENT PROFILE ADULT - NSPROPOAURINARYCATHETER_GEN_A_NUR
2 RN Skin Assessment Completed by , RN and , RN.     Head: WDL  Ears: WDL  Nose: WDL  Mouth: WDL  Neck: WDL  Breasts/Chest: WDL  Shoulder Blades: WDL  Spine: WDL  (R) Arm/Elbow/hand: WDL  (L) Arm/Elbow/hand: WDL  Abdomen: 6 lap sites covered in Telfa and tegaderm  Groin: WDL, Shafer in place per Sx order  Sacrum/Coccyx/Buttocks: WDL  (R) Leg: WDL  (L) Leg: WDL  (R) Heel/Foot/Toe: WDL  (L) Heel/Foot/Toe: WDL              Devices in place: BP Cuff and Pulse Ox     Interventions in place: Pillows     Possible skin injury found: No     Pictures uploaded into Epic: N/A  Wound Consult Placed: N/A      no

## 2025-03-04 NOTE — PATIENT PROFILE ADULT - NSPROHMDIABETBLDGLCTST_GEN_A_NUR
Duration Of Freeze Thaw-Cycle (Seconds): 0 Consent: The patient's consent was obtained including but not limited to risks of crusting, scabbing, blistering, scarring, darker or lighter pigmentary change, recurrence, incomplete removal and infection. Render Post-Care Instructions In Note?: no Post-Care Instructions: I reviewed with the patient in detail post-care instructions. Patient is to wear sunprotection, and avoid picking at any of the treated lesions. Pt may apply Vaseline to crusted or scabbing areas. Show Applicator Variable?: Yes Detail Level: Detailed Spray Paint Text: The liquid nitrogen was applied to the skin utilizing a spray paint frosting technique. Medical Necessity Information: It is in your best interest to select a reason for this procedure from the list below. All of these items fulfill various CMS LCD requirements except the new and changing color options. Medical Necessity Clause: This procedure was medically necessary because the lesions that were treated were: 2 times/day